# Patient Record
Sex: FEMALE | Race: BLACK OR AFRICAN AMERICAN | NOT HISPANIC OR LATINO | Employment: FULL TIME | URBAN - METROPOLITAN AREA
[De-identification: names, ages, dates, MRNs, and addresses within clinical notes are randomized per-mention and may not be internally consistent; named-entity substitution may affect disease eponyms.]

---
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
## 2019-01-28 ENCOUNTER — OFFICE VISIT (OUTPATIENT)
Dept: OBSTETRICS AND GYNECOLOGY | Facility: CLINIC | Age: 19
End: 2019-01-28
Payer: MEDICAID

## 2019-01-28 VITALS
BODY MASS INDEX: 24.38 KG/M2 | TEMPERATURE: 97 F | DIASTOLIC BLOOD PRESSURE: 72 MMHG | SYSTOLIC BLOOD PRESSURE: 133 MMHG | WEIGHT: 151.69 LBS | HEIGHT: 66 IN

## 2019-01-28 DIAGNOSIS — Z30.431 IUD CHECK UP: Primary | ICD-10-CM

## 2019-01-28 PROCEDURE — 99999 PR PBB SHADOW E&M-EST. PATIENT-LVL III: ICD-10-PCS | Mod: PBBFAC,,, | Performed by: OBSTETRICS & GYNECOLOGY

## 2019-01-28 PROCEDURE — 99213 PR OFFICE/OUTPT VISIT, EST, LEVL III, 20-29 MIN: ICD-10-PCS | Mod: S$PBB,,, | Performed by: OBSTETRICS & GYNECOLOGY

## 2019-01-28 PROCEDURE — 99999 PR PBB SHADOW E&M-EST. PATIENT-LVL III: CPT | Mod: PBBFAC,,, | Performed by: OBSTETRICS & GYNECOLOGY

## 2019-01-28 PROCEDURE — 99213 OFFICE O/P EST LOW 20 MIN: CPT | Mod: S$PBB,,, | Performed by: OBSTETRICS & GYNECOLOGY

## 2019-01-28 PROCEDURE — 99213 OFFICE O/P EST LOW 20 MIN: CPT | Mod: PBBFAC | Performed by: OBSTETRICS & GYNECOLOGY

## 2019-01-28 NOTE — PROGRESS NOTES
Subjective:       Patient ID: Fiona Le is a 18 y.o. female.    Chief Complaint:  IUD Check      History of Present Illness  HPI  Patient comes in today because she could not feel her IUD strings  Status post ParaGard insertion on 3/31/2016    No other complaint.      GYN & OB History  Patient's last menstrual period was 2019 (exact date).   Date of Last Pap: No result found    OB History    Para Term  AB Living   1 1 1     1   SAB TAB Ectopic Multiple Live Births         0 1      # Outcome Date GA Lbr Nahid/2nd Weight Sex Delivery Anes PTL Lv   1 Term 16 38w5d  3.738 kg (8 lb 3.9 oz) M Vag-Vacuum EPI N DOUG        Past Medical History:   Diagnosis Date    Asthma        History reviewed. No pertinent surgical history.    Family History   Problem Relation Age of Onset    Miscarriages / Stillbirths Mother     Hypertension Mother     Hypertension Maternal Grandmother     Diabetes Maternal Grandfather     Stroke Maternal Grandfather     Seizures Brother        Social History     Socioeconomic History    Marital status: Single     Spouse name: None    Number of children: None    Years of education: None    Highest education level: None   Social Needs    Financial resource strain: None    Food insecurity - worry: None    Food insecurity - inability: None    Transportation needs - medical: None    Transportation needs - non-medical: None   Occupational History    None   Tobacco Use    Smoking status: Never Smoker    Smokeless tobacco: Never Used   Substance and Sexual Activity    Alcohol use: No     Alcohol/week: 0.0 oz    Drug use: No    Sexual activity: Yes     Partners: Male     Birth control/protection: None   Other Topics Concern    None   Social History Narrative    Together for a year    Doing well at [employer]       Current Outpatient Medications   Medication Sig Dispense Refill    copper (PARAGARD T 380A) 380 square mm IUD 1 Device by  Intrauterine route continuous.      peak flow meter Nellie Use meter once daily & after each treatment with asthma medication. If peak flow is less than 70% of baseline, go to nearest ER for care. 1 each 0     No current facility-administered medications for this visit.        Review of patient's allergies indicates:   Allergen Reactions    Fish containing products Rash       Review of Systems  Review of Systems   Constitutional: Negative for activity change, appetite change, chills, fatigue, fever and unexpected weight change.   HENT: Negative for mouth sores.    Respiratory: Negative for cough, shortness of breath and wheezing.    Cardiovascular: Negative for chest pain and palpitations.   Gastrointestinal: Negative for abdominal pain, bloating, blood in stool, constipation, nausea and vomiting.   Endocrine: Negative for diabetes and hot flashes.   Genitourinary: Negative for dyspareunia, dysuria, frequency, hematuria, menorrhagia, menstrual problem, pelvic pain, urgency, vaginal bleeding, vaginal discharge, vaginal pain, dysmenorrhea, urinary incontinence, postcoital bleeding and vaginal odor.   Musculoskeletal: Negative for back pain and myalgias.   Neurological: Negative for seizures and headaches.   Psychiatric/Behavioral: Negative for depression and sleep disturbance. The patient is not nervous/anxious.    Breast: Negative for mass, mastodynia and nipple discharge          Objective:    Physical Exam:   Constitutional: She appears well-developed and well-nourished. No distress.    HENT:   Head: Normocephalic and atraumatic.    Eyes: EOM are normal.    Neck: Normal range of motion.     Pulmonary/Chest: Effort normal. No respiratory distress.        Abdominal: Soft. She exhibits no distension. There is no tenderness. There is no rebound and no guarding.     Genitourinary: Uterus normal. Vaginal discharge found.   Genitourinary Comments: Vulva without any obvious lesions.  Vaginal vault with good support.   Moderate bleeding noted.  No obvious lesion.  Cervix is without any cervical motion tenderness.  No obvious lesion.  ParaGard strings clearly visible.  Uterus is small, non-tender, normal contour.  Adnexa is without any masses or tenderness.           Musculoskeletal: Normal range of motion.       Neurological: She is alert.    Skin: Skin is warm and dry.    Psychiatric: She has a normal mood and affect.          Assessment:        1. IUD check up              Plan:      I have discussed with the patient regarding her condition  She is doing well with her ParaGard  Back next year.

## 2019-08-19 ENCOUNTER — HOSPITAL ENCOUNTER (EMERGENCY)
Facility: HOSPITAL | Age: 19
Discharge: HOME | End: 2019-08-19
Attending: EMERGENCY MEDICINE
Payer: MEDICAID

## 2019-08-19 VITALS
TEMPERATURE: 98 F | DIASTOLIC BLOOD PRESSURE: 65 MMHG | RESPIRATION RATE: 17 BRPM | BODY MASS INDEX: 23.22 KG/M2 | SYSTOLIC BLOOD PRESSURE: 123 MMHG | HEART RATE: 90 BPM | WEIGHT: 136 LBS | OXYGEN SATURATION: 100 % | HEIGHT: 64 IN

## 2019-08-19 DIAGNOSIS — L60.0 INGROWN TOENAIL WITH INFECTION: Primary | ICD-10-CM

## 2019-08-19 LAB
B-HCG UR QL: NEGATIVE
CTP QC/QA: YES

## 2019-08-19 PROCEDURE — 81025 URINE PREGNANCY TEST: CPT | Performed by: NURSE PRACTITIONER

## 2019-08-19 PROCEDURE — 99284 EMERGENCY DEPT VISIT MOD MDM: CPT

## 2019-08-19 PROCEDURE — 25000003 PHARM REV CODE 250: Performed by: EMERGENCY MEDICINE

## 2019-08-19 RX ORDER — NAPROXEN 500 MG/1
500 TABLET ORAL 2 TIMES DAILY WITH MEALS
Qty: 20 TABLET | Refills: 0 | Status: SHIPPED | OUTPATIENT
Start: 2019-08-19 | End: 2019-08-24

## 2019-08-19 RX ORDER — SULFAMETHOXAZOLE AND TRIMETHOPRIM 800; 160 MG/1; MG/1
1 TABLET ORAL 2 TIMES DAILY
Qty: 14 TABLET | Refills: 0 | Status: SHIPPED | OUTPATIENT
Start: 2019-08-19 | End: 2019-08-26

## 2019-08-19 RX ORDER — SULFAMETHOXAZOLE AND TRIMETHOPRIM 800; 160 MG/1; MG/1
1 TABLET ORAL
Status: COMPLETED | OUTPATIENT
Start: 2019-08-19 | End: 2019-08-19

## 2019-08-19 RX ORDER — NAPROXEN 500 MG/1
500 TABLET ORAL
Status: COMPLETED | OUTPATIENT
Start: 2019-08-19 | End: 2019-08-19

## 2019-08-19 RX ADMIN — SULFAMETHOXAZOLE AND TRIMETHOPRIM 1 TABLET: 800; 160 TABLET ORAL at 06:08

## 2019-08-19 RX ADMIN — NAPROXEN 500 MG: 500 TABLET ORAL at 06:08

## 2019-08-19 NOTE — ED PROVIDER NOTES
Encounter Date: 8/19/2019    SCRIBE #1 NOTE: I, Flores Stone, am scribing for, and in the presence of,  Magno Toledo MD. I have scribed the following portions of the note - Other sections scribed: HPI, ROS, PE.       History     Chief Complaint   Patient presents with    Toe Pain     left great toe pain after ingrown toenail removed     CC: Ingrown toenail    HPI:  This is a 19 y.o. female with a PMHx of asthma who presents to the Emergency Department with a cc of mild ingrown toenail pain on the left big toe. Symptoms are worsened by standing. Pt notes toe became purple so she cut the toenail herself. Patient reports Tetanus is up to date. She reports that her work requires her to stand. NKDA.        Review of patient's allergies indicates:   Allergen Reactions    Fish containing products Rash     Past Medical History:   Diagnosis Date    Asthma      History reviewed. No pertinent surgical history.  Family History   Problem Relation Age of Onset    Miscarriages / Stillbirths Mother     Hypertension Mother     Hypertension Maternal Grandmother     Diabetes Maternal Grandfather     Stroke Maternal Grandfather     Seizures Brother      Social History     Tobacco Use    Smoking status: Never Smoker    Smokeless tobacco: Never Used   Substance Use Topics    Alcohol use: No     Alcohol/week: 0.0 oz    Drug use: No     Review of Systems   Constitutional: Negative for fever.   HENT: Negative for sore throat.    Respiratory: Negative for shortness of breath.    Cardiovascular: Negative for chest pain.   Gastrointestinal: Negative for nausea.   Genitourinary: Negative for dysuria.   Musculoskeletal: Negative for back pain.        (+) toenail pain   Skin: Negative for rash.   Neurological: Negative for weakness.   Hematological: Does not bruise/bleed easily.       Physical Exam     Initial Vitals [08/19/19 1753]   BP Pulse Resp Temp SpO2   123/65 90 17 97.6 °F (36.4 °C) 100 %      MAP       --          Physical Exam    Constitutional: She appears well-developed and well-nourished.   HENT:   Head: Normocephalic and atraumatic.   Eyes: EOM are normal. Pupils are equal, round, and reactive to light.   Neck: Normal range of motion. Neck supple. No JVD present.   Cardiovascular: Normal rate, regular rhythm and normal heart sounds.   No murmur heard.  Pulmonary/Chest: Breath sounds normal. No respiratory distress.   Abdominal: Soft. Bowel sounds are normal. There is no tenderness.   Musculoskeletal:   Medial ingrown toenail to the right great toe with infection no abscess or drainage   Neurological: She is alert and oriented to person, place, and time. She has normal strength.   Skin: Skin is warm and dry. Capillary refill takes less than 2 seconds.   Psychiatric: She has a normal mood and affect. Her behavior is normal.         ED Course   Procedures  Labs Reviewed   POCT URINE PREGNANCY    The patient declining further removal of ingrown toenail this time.  There appears to be some granulation tissue an early infection but no evidence of abscess.  Will attempt soaks and antibiotics and follow up with Podiatry.  Return for any worsening symptoms.       Imaging Results    None          Medical Decision Making:   Clinical Tests:   Lab Tests: Ordered and Reviewed      Scribe attestation: I, Magno Toledo, personally performed the services described in this documentation. All medical record entries made by the scribe were at my direction and in my presence.  I have reviewed the chart and agree that the record reflects my personal performance and is accurate and complete.                   Clinical Impression:       ICD-10-CM ICD-9-CM   1. Ingrown toenail with infection L60.0 703.0                                Magno Toledo MD  08/21/19 9086

## 2019-08-19 NOTE — ED TRIAGE NOTES
Pt presents to ED c/o left great toe pain.  Pt states she cut ingrown toenail out approx 2 days ago and now toe is red and swollen.  Painful if standing/walking too long

## 2019-09-30 ENCOUNTER — HOSPITAL ENCOUNTER (EMERGENCY)
Facility: HOSPITAL | Age: 19
Discharge: HOME | End: 2019-09-30
Attending: EMERGENCY MEDICINE
Payer: MEDICAID

## 2019-09-30 VITALS
HEART RATE: 88 BPM | BODY MASS INDEX: 20.89 KG/M2 | DIASTOLIC BLOOD PRESSURE: 69 MMHG | RESPIRATION RATE: 18 BRPM | TEMPERATURE: 100 F | HEIGHT: 66 IN | SYSTOLIC BLOOD PRESSURE: 123 MMHG | WEIGHT: 130 LBS | OXYGEN SATURATION: 97 %

## 2019-09-30 DIAGNOSIS — J10.1 INFLUENZA B: Primary | ICD-10-CM

## 2019-09-30 LAB
B-HCG UR QL: NEGATIVE
CTP QC/QA: YES
CTP QC/QA: YES
POC MOLECULAR INFLUENZA A AGN: NEGATIVE
POC MOLECULAR INFLUENZA B AGN: POSITIVE

## 2019-09-30 PROCEDURE — 25000003 PHARM REV CODE 250: Performed by: NURSE PRACTITIONER

## 2019-09-30 PROCEDURE — 99283 EMERGENCY DEPT VISIT LOW MDM: CPT

## 2019-09-30 PROCEDURE — 81025 URINE PREGNANCY TEST: CPT | Performed by: NURSE PRACTITIONER

## 2019-09-30 RX ORDER — IBUPROFEN 600 MG/1
600 TABLET ORAL
Status: COMPLETED | OUTPATIENT
Start: 2019-09-30 | End: 2019-09-30

## 2019-09-30 RX ORDER — OSELTAMIVIR PHOSPHATE 75 MG/1
75 CAPSULE ORAL 2 TIMES DAILY
Qty: 10 CAPSULE | Refills: 0 | Status: SHIPPED | OUTPATIENT
Start: 2019-09-30 | End: 2019-10-05

## 2019-09-30 RX ORDER — IBUPROFEN 600 MG/1
600 TABLET ORAL EVERY 6 HOURS PRN
Qty: 20 TABLET | Refills: 0 | OUTPATIENT
Start: 2019-09-30 | End: 2020-01-13

## 2019-09-30 RX ADMIN — IBUPROFEN 600 MG: 600 TABLET, FILM COATED ORAL at 08:09

## 2019-10-01 NOTE — ED TRIAGE NOTES
Generalized body aches w/ fever and cough that began yesterday. Reports taking ibuprofen around 3.

## 2019-10-01 NOTE — ED PROVIDER NOTES
Encounter Date: 9/30/2019    This is a SORT/MSE of a 19 y.o. female presenting to the ED with c/o generalized body aches with fever and cough that began yesterday. Care will be transferred to an alternate provider when patient is roomed for a full evaluation and final disposition. BRAD Sun, FNP-C 9/30/2019 8:03 PM       History     Chief Complaint   Patient presents with    Generalized Body Aches     Generalized body aches w/ fever and cough that began yesterday. Reports taking ibuprofen around 3.      20 y/o female presents to the ED with complaints of fever, cough, and generalized body aches that began yesterday.  She denies rash, abdominal pain, nausea, vomiting, diarrhea, numbness, weakness, runny nose, nasal congestion, CP, SOB, or any other complaints.  She took Ibuprofen at 3 PM today with minimal relief.  She rates her current pain as 9/10.  No aggravating or alleviating factors.          Review of patient's allergies indicates:   Allergen Reactions    Fish containing products Rash     Past Medical History:   Diagnosis Date    Asthma      No past surgical history on file.  Family History   Problem Relation Age of Onset    Miscarriages / Stillbirths Mother     Hypertension Mother     Hypertension Maternal Grandmother     Diabetes Maternal Grandfather     Stroke Maternal Grandfather     Seizures Brother      Social History     Tobacco Use    Smoking status: Never Smoker    Smokeless tobacco: Never Used   Substance Use Topics    Alcohol use: No     Alcohol/week: 0.0 standard drinks    Drug use: No     Review of Systems   Constitutional: Positive for fever. Negative for chills.   HENT: Negative for congestion, ear pain, rhinorrhea, sore throat and trouble swallowing.    Eyes: Negative for pain, discharge and redness.   Respiratory: Positive for cough. Negative for shortness of breath.    Cardiovascular: Negative for chest pain.   Gastrointestinal: Negative for abdominal pain, diarrhea, nausea  and vomiting.   Genitourinary: Negative for decreased urine volume and dysuria.   Musculoskeletal: Positive for myalgias. Negative for back pain, neck pain and neck stiffness.   Skin: Negative for rash.   Neurological: Negative for dizziness, weakness, light-headedness, numbness and headaches.   Psychiatric/Behavioral: Negative for confusion.       Physical Exam     Initial Vitals [09/30/19 1933]   BP Pulse Resp Temp SpO2   120/63 89 16 99.1 °F (37.3 °C) 97 %      MAP       --         Physical Exam    Nursing note and vitals reviewed.  Constitutional: Vital signs are normal. She appears well-developed.  Non-toxic appearance. She does not appear ill.   HENT:   Head: Normocephalic and atraumatic.   Right Ear: Tympanic membrane normal.   Left Ear: Tympanic membrane normal.   Nose: Mucosal edema present.   Mouth/Throat: Uvula is midline, oropharynx is clear and moist and mucous membranes are normal.   Eyes: Conjunctivae are normal.   Neck: Normal range of motion.   Cardiovascular: Normal rate and regular rhythm.   Pulmonary/Chest: Effort normal and breath sounds normal. She exhibits no tenderness.   Abdominal: Soft. There is no tenderness.   Neurological: She is alert and oriented to person, place, and time. Gait normal. GCS eye subscore is 4. GCS verbal subscore is 5. GCS motor subscore is 6.   Skin: Skin is warm, dry and intact. No rash noted.   Psychiatric: She has a normal mood and affect. Her speech is normal and behavior is normal. Judgment and thought content normal.         ED Course   Procedures  Labs Reviewed   POCT INFLUENZA A/B MOLECULAR - Abnormal; Notable for the following components:       Result Value    POC Molecular Influenza B Ag Positive (*)     All other components within normal limits   POCT URINE PREGNANCY          Imaging Results    None                APC / Resident Notes:   This is an evaluation of a 19 y.o. female that presents to the Emergency Department for Fever, Cough and Body Aches for 1  days. The patient is a non-toxic, afebrile, and well appearing female. On physical exam ears are without infection. Pharynx with no erythema and no exudates. Appears well hydrated with moist mucus membranes. Neck soft and supple with no meningeal signs; without cervical lymphadenopathy. Breath sounds are clear and equal bilaterally. No tachypnea or respiratory distress with room air pulse ox of 97% and no evidence of hypoxia or cyanosis.     Vital Signs Are Reassuring. RESULTS: Influenza: Positive.    The patient is within the antiviral treatment window and has been offered treatment with Tamilfu. Risks/Benefits discussed and the patient would like to receive the prescription. [facility] information handout will be on the discharge paperwork.     My overall impression is Influenza B. I considered, but at this time, do not suspect OM, OE, strep pharyngitis, meningitis, pneumonia, significant dehydration, or acute bacterial sinusitis.    ED Course: UPT, Influenza, Ibuprofen. D/C Meds: Ibuprofen, Tamiflu. D/C Information: Supportive care, Tylenol/Ibuprofen PRN, Hydration. The diagnosis, treatment plan, instructions for follow-up and reevaluation with PCP as well as ED return precautions were discussed and understanding was verbalized. All questions or concerns have been addressed.                        Clinical Impression:       ICD-10-CM ICD-9-CM   1. Influenza B J10.1 487.1         Disposition:   Disposition: Discharged  Condition: Stable                        KINJAL Wells  09/30/19 2049

## 2020-01-13 ENCOUNTER — HOSPITAL ENCOUNTER (EMERGENCY)
Facility: HOSPITAL | Age: 20
Discharge: HOME | End: 2020-01-13
Attending: EMERGENCY MEDICINE
Payer: MEDICAID

## 2020-01-13 VITALS
DIASTOLIC BLOOD PRESSURE: 64 MMHG | OXYGEN SATURATION: 97 % | RESPIRATION RATE: 17 BRPM | HEIGHT: 66 IN | HEART RATE: 83 BPM | WEIGHT: 155 LBS | TEMPERATURE: 98 F | BODY MASS INDEX: 24.91 KG/M2 | SYSTOLIC BLOOD PRESSURE: 115 MMHG

## 2020-01-13 DIAGNOSIS — N64.4 BREAST PAIN: ICD-10-CM

## 2020-01-13 DIAGNOSIS — R10.30 LOWER ABDOMINAL PAIN: Primary | ICD-10-CM

## 2020-01-13 LAB
B-HCG UR QL: NEGATIVE
BILIRUB UR QL STRIP: NEGATIVE
CLARITY UR: CLEAR
COLOR UR: YELLOW
CTP QC/QA: YES
GLUCOSE UR QL STRIP: NEGATIVE
HGB UR QL STRIP: NEGATIVE
KETONES UR QL STRIP: ABNORMAL
LEUKOCYTE ESTERASE UR QL STRIP: ABNORMAL
MICROSCOPIC COMMENT: NORMAL
NITRITE UR QL STRIP: NEGATIVE
PH UR STRIP: 6 [PH] (ref 5–8)
PROT UR QL STRIP: NEGATIVE
SP GR UR STRIP: 1.01 (ref 1–1.03)
URN SPEC COLLECT METH UR: ABNORMAL
UROBILINOGEN UR STRIP-ACNC: NEGATIVE EU/DL
WBC #/AREA URNS HPF: 1 /HPF (ref 0–5)

## 2020-01-13 PROCEDURE — 99283 EMERGENCY DEPT VISIT LOW MDM: CPT

## 2020-01-13 PROCEDURE — 25000003 PHARM REV CODE 250: Performed by: PHYSICIAN ASSISTANT

## 2020-01-13 PROCEDURE — 81025 URINE PREGNANCY TEST: CPT | Performed by: PHYSICIAN ASSISTANT

## 2020-01-13 PROCEDURE — 81000 URINALYSIS NONAUTO W/SCOPE: CPT

## 2020-01-13 RX ORDER — IBUPROFEN 600 MG/1
600 TABLET ORAL
Status: COMPLETED | OUTPATIENT
Start: 2020-01-13 | End: 2020-01-13

## 2020-01-13 RX ORDER — IBUPROFEN 600 MG/1
600 TABLET ORAL EVERY 6 HOURS PRN
Qty: 20 TABLET | Refills: 0 | Status: SHIPPED | OUTPATIENT
Start: 2020-01-13 | End: 2020-12-14

## 2020-01-13 RX ADMIN — IBUPROFEN 600 MG: 600 TABLET ORAL at 11:01

## 2020-01-14 NOTE — ED TRIAGE NOTES
Pt reports lower back pain that started yesterday and breast soreness and severe pain that starts today. Pt reports taking ibuprofen for pain and pt is allergic to fish products.

## 2020-01-14 NOTE — DISCHARGE INSTRUCTIONS
Follow-up and establish care with a primary care provider for re-evaluation further recommendations.  Follow-up with an OBGYN due to breast pain.    Tylenol/ibuprofen as needed for discomfort.  Return to this ED if you begin with persistent vomiting, if you begin with fever, if pain worsens despite treatment, if you begin with breast swelling/redness/warmth, if any other problems occur.

## 2020-01-14 NOTE — ED PROVIDER NOTES
Encounter Date: 1/13/2020       History     Chief Complaint   Patient presents with    Breast Pain     patient reports breast and back pain X 1 day. Patient reports iud in place. rates pain 9/10 describes the pain as squeezing     19-year-old female with history of asthma presents to ED complaining of low abdominal cramping and midline low back pain which began today.  Abdominal pain is constant.  No alleviating or exacerbating factors.  No nausea vomiting.  No trauma.  Denies dysuria, hematuria, strong odor to urine, or increased urinary frequency.  No flank pain. No history of any abdominal surgeries.  No fever or chills. Normal bowel habits.  Soreness to midline low back, worse with movement.  No radiculopathy or paresthesia.  No leg weakness.  No GI/ issues, no bowel or bladder incontinence or retention.  Denies trauma. Patient also complaining of bilateral breast soreness today.  Denies history of any masses. No areola swelling or nipple discharge. Denies any redness or warmth.  No history of any previous breast issues.  LMP 12/25/2019.  IUD in place, admits to irregular menses.  No vaginal complaints at this time.        Review of patient's allergies indicates:   Allergen Reactions    Fish containing products Rash     Past Medical History:   Diagnosis Date    Asthma      History reviewed. No pertinent surgical history.  Family History   Problem Relation Age of Onset    Miscarriages / Stillbirths Mother     Hypertension Mother     Hypertension Maternal Grandmother     Diabetes Maternal Grandfather     Stroke Maternal Grandfather     Seizures Brother      Social History     Tobacco Use    Smoking status: Never Smoker    Smokeless tobacco: Never Used   Substance Use Topics    Alcohol use: No     Alcohol/week: 0.0 standard drinks    Drug use: No     Review of Systems   Constitutional: Negative for activity change, appetite change, chills and fever.   HENT: Negative for congestion and rhinorrhea.     Respiratory: Negative for apnea and cough.    Cardiovascular: Negative for chest pain and leg swelling.   Gastrointestinal: Positive for abdominal pain. Negative for constipation, diarrhea, nausea and vomiting.   Genitourinary: Negative for difficulty urinating, dysuria, flank pain, frequency, hematuria, vaginal bleeding, vaginal discharge and vaginal pain.   Musculoskeletal: Negative for myalgias, neck pain and neck stiffness.        Breast pain bilateral   Neurological: Negative for dizziness, light-headedness and headaches.       Physical Exam     Initial Vitals [01/13/20 2126]   BP Pulse Resp Temp SpO2   (!) 120/57 75 17 98.8 °F (37.1 °C) 95 %      MAP       --         Physical Exam    Nursing note and vitals reviewed.  Constitutional: She appears well-developed and well-nourished. She is not diaphoretic. No distress.   Well-appearing and nontoxic.  Resting comfortably on exam table.   HENT:   Head: Normocephalic and atraumatic.   Eyes: EOM are normal. Pupils are equal, round, and reactive to light.   Neck: Normal range of motion. Neck supple.   Cardiovascular: Intact distal pulses.   Pulmonary/Chest: No respiratory distress.   There is generalized bilateral breast tenderness, no palpable masses, no swelling or overlying skin changes, no peau d'orange appearance, no nipple changes or discharge.   Abdominal:   Abdomen overall soft, normal bowel sounds ×4.  Mild TTP suprapubic region.  No rebound or guarding.  No palpable mass or distention.  No flank or CVA tenderness.  Negative Hardin sign.  No pain over McBurney's point.   Musculoskeletal: Normal range of motion. She exhibits no tenderness.   Neurological: She is alert and oriented to person, place, and time. GCS score is 15. GCS eye subscore is 4. GCS verbal subscore is 5. GCS motor subscore is 6.   Skin: Skin is warm and dry. Capillary refill takes less than 2 seconds. No erythema.   Psychiatric: She has a normal mood and affect. Her behavior is normal.  Judgment and thought content normal.         ED Course   Procedures  Labs Reviewed   URINALYSIS, REFLEX TO URINE CULTURE - Abnormal; Notable for the following components:       Result Value    Ketones, UA Trace (*)     Leukocytes, UA Trace (*)     All other components within normal limits    Narrative:     Preferred Collection Type->Urine, Clean Catch   URINALYSIS MICROSCOPIC    Narrative:     Preferred Collection Type->Urine, Clean Catch   POCT URINE PREGNANCY          Imaging Results    None          Medical Decision Making:   Differential Diagnosis:   UTI, STI, PID, constipation, pregnancy, hormonal changes?, viral illness  ED Management:  Unsure of cause of patient's symptoms. I do not suspect emergent process.  PCP and OB follow-up.  Continue supportive treatment.                                 Clinical Impression:       ICD-10-CM ICD-9-CM   1. Lower abdominal pain R10.30 789.09   2. Breast pain N64.4 611.71         Disposition:   Disposition: Discharged  Condition: Stable                     Nils Onofre PA-C  01/14/20 0614

## 2020-03-06 ENCOUNTER — HOSPITAL ENCOUNTER (EMERGENCY)
Facility: HOSPITAL | Age: 20
Discharge: HOME | End: 2020-03-06
Attending: EMERGENCY MEDICINE
Payer: MEDICAID

## 2020-03-06 VITALS
HEIGHT: 64 IN | TEMPERATURE: 98 F | RESPIRATION RATE: 18 BRPM | HEART RATE: 74 BPM | BODY MASS INDEX: 23.05 KG/M2 | DIASTOLIC BLOOD PRESSURE: 53 MMHG | OXYGEN SATURATION: 98 % | SYSTOLIC BLOOD PRESSURE: 113 MMHG | WEIGHT: 135 LBS

## 2020-03-06 DIAGNOSIS — R10.31 RIGHT LOWER QUADRANT PAIN: Primary | ICD-10-CM

## 2020-03-06 LAB
ALBUMIN SERPL BCP-MCNC: 4.4 G/DL (ref 3.5–5.2)
ALP SERPL-CCNC: 63 U/L (ref 55–135)
ALT SERPL W/O P-5'-P-CCNC: 24 U/L (ref 10–44)
ANION GAP SERPL CALC-SCNC: 10 MMOL/L (ref 8–16)
AST SERPL-CCNC: 23 U/L (ref 10–40)
B-HCG UR QL: NEGATIVE
BACTERIA GENITAL QL WET PREP: ABNORMAL
BASOPHILS # BLD AUTO: 0.02 K/UL (ref 0–0.2)
BASOPHILS NFR BLD: 0.3 % (ref 0–1.9)
BILIRUB SERPL-MCNC: 0.4 MG/DL (ref 0.1–1)
BILIRUB UR QL STRIP: NEGATIVE
BUN SERPL-MCNC: 8 MG/DL (ref 6–20)
CALCIUM SERPL-MCNC: 9.7 MG/DL (ref 8.7–10.5)
CHLORIDE SERPL-SCNC: 102 MMOL/L (ref 95–110)
CLARITY UR: CLEAR
CLUE CELLS VAG QL WET PREP: ABNORMAL
CO2 SERPL-SCNC: 27 MMOL/L (ref 23–29)
COLOR UR: YELLOW
CREAT SERPL-MCNC: 0.8 MG/DL (ref 0.5–1.4)
CTP QC/QA: YES
DIFFERENTIAL METHOD: ABNORMAL
EOSINOPHIL # BLD AUTO: 0.1 K/UL (ref 0–0.5)
EOSINOPHIL NFR BLD: 1.1 % (ref 0–8)
ERYTHROCYTE [DISTWIDTH] IN BLOOD BY AUTOMATED COUNT: 12.2 % (ref 11.5–14.5)
EST. GFR  (AFRICAN AMERICAN): >60 ML/MIN/1.73 M^2
EST. GFR  (NON AFRICAN AMERICAN): >60 ML/MIN/1.73 M^2
FILAMENT FUNGI VAG WET PREP-#/AREA: ABNORMAL
GLUCOSE SERPL-MCNC: 88 MG/DL (ref 70–110)
GLUCOSE UR QL STRIP: NEGATIVE
HCT VFR BLD AUTO: 40.5 % (ref 37–48.5)
HGB BLD-MCNC: 12.7 G/DL (ref 12–16)
HGB UR QL STRIP: NEGATIVE
IMM GRANULOCYTES # BLD AUTO: 0.01 K/UL (ref 0–0.04)
IMM GRANULOCYTES NFR BLD AUTO: 0.2 % (ref 0–0.5)
KETONES UR QL STRIP: NEGATIVE
LEUKOCYTE ESTERASE UR QL STRIP: NEGATIVE
LIPASE SERPL-CCNC: 43 U/L (ref 4–60)
LYMPHOCYTES # BLD AUTO: 2.9 K/UL (ref 1–4.8)
LYMPHOCYTES NFR BLD: 47.7 % (ref 18–48)
MCH RBC QN AUTO: 28.7 PG (ref 27–31)
MCHC RBC AUTO-ENTMCNC: 31.4 G/DL (ref 32–36)
MCV RBC AUTO: 92 FL (ref 82–98)
MONOCYTES # BLD AUTO: 0.4 K/UL (ref 0.3–1)
MONOCYTES NFR BLD: 6.7 % (ref 4–15)
NEUTROPHILS # BLD AUTO: 2.7 K/UL (ref 1.8–7.7)
NEUTROPHILS NFR BLD: 44 % (ref 38–73)
NITRITE UR QL STRIP: NEGATIVE
NRBC BLD-RTO: 0 /100 WBC
PH UR STRIP: 5 [PH] (ref 5–8)
PLATELET # BLD AUTO: 271 K/UL (ref 150–350)
PMV BLD AUTO: 10.3 FL (ref 9.2–12.9)
POTASSIUM SERPL-SCNC: 3.9 MMOL/L (ref 3.5–5.1)
PROT SERPL-MCNC: 8.4 G/DL (ref 6–8.4)
PROT UR QL STRIP: NEGATIVE
RBC # BLD AUTO: 4.42 M/UL (ref 4–5.4)
SODIUM SERPL-SCNC: 139 MMOL/L (ref 136–145)
SP GR UR STRIP: 1.02 (ref 1–1.03)
SPECIMEN SOURCE: ABNORMAL
T VAGINALIS GENITAL QL WET PREP: ABNORMAL
URN SPEC COLLECT METH UR: ABNORMAL
UROBILINOGEN UR STRIP-ACNC: ABNORMAL EU/DL
WBC # BLD AUTO: 6.12 K/UL (ref 3.9–12.7)
WBC #/AREA VAG WET PREP: ABNORMAL
YEAST GENITAL QL WET PREP: ABNORMAL

## 2020-03-06 PROCEDURE — 81025 URINE PREGNANCY TEST: CPT | Performed by: NURSE PRACTITIONER

## 2020-03-06 PROCEDURE — 99285 EMERGENCY DEPT VISIT HI MDM: CPT | Mod: 25

## 2020-03-06 PROCEDURE — 96374 THER/PROPH/DIAG INJ IV PUSH: CPT | Mod: 59

## 2020-03-06 PROCEDURE — 85025 COMPLETE CBC W/AUTO DIFF WBC: CPT

## 2020-03-06 PROCEDURE — 63600175 PHARM REV CODE 636 W HCPCS: Performed by: NURSE PRACTITIONER

## 2020-03-06 PROCEDURE — 96361 HYDRATE IV INFUSION ADD-ON: CPT

## 2020-03-06 PROCEDURE — 81003 URINALYSIS AUTO W/O SCOPE: CPT

## 2020-03-06 PROCEDURE — 80053 COMPREHEN METABOLIC PANEL: CPT

## 2020-03-06 PROCEDURE — 25000003 PHARM REV CODE 250: Performed by: NURSE PRACTITIONER

## 2020-03-06 PROCEDURE — 87210 SMEAR WET MOUNT SALINE/INK: CPT

## 2020-03-06 PROCEDURE — 83690 ASSAY OF LIPASE: CPT

## 2020-03-06 PROCEDURE — 25500020 PHARM REV CODE 255: Performed by: NURSE PRACTITIONER

## 2020-03-06 PROCEDURE — 87491 CHLMYD TRACH DNA AMP PROBE: CPT

## 2020-03-06 RX ORDER — NAPROXEN 500 MG/1
500 TABLET ORAL 2 TIMES DAILY WITH MEALS
Qty: 60 TABLET | Refills: 0 | Status: SHIPPED | OUTPATIENT
Start: 2020-03-06 | End: 2020-04-15 | Stop reason: SDUPTHER

## 2020-03-06 RX ORDER — IBUPROFEN 600 MG/1
600 TABLET ORAL
Status: COMPLETED | OUTPATIENT
Start: 2020-03-06 | End: 2020-03-06

## 2020-03-06 RX ORDER — MORPHINE SULFATE 10 MG/ML
4 INJECTION INTRAMUSCULAR; INTRAVENOUS; SUBCUTANEOUS
Status: COMPLETED | OUTPATIENT
Start: 2020-03-06 | End: 2020-03-06

## 2020-03-06 RX ORDER — MAG HYDROX/ALUMINUM HYD/SIMETH 200-200-20
30 SUSPENSION, ORAL (FINAL DOSE FORM) ORAL
Status: COMPLETED | OUTPATIENT
Start: 2020-03-06 | End: 2020-03-06

## 2020-03-06 RX ADMIN — MORPHINE SULFATE 4 MG: 10 INJECTION INTRAVENOUS at 05:03

## 2020-03-06 RX ADMIN — ALUMINUM HYDROXIDE, MAGNESIUM HYDROXIDE, AND SIMETHICONE 30 ML: 200; 200; 20 SUSPENSION ORAL at 03:03

## 2020-03-06 RX ADMIN — IBUPROFEN 600 MG: 600 TABLET, FILM COATED ORAL at 03:03

## 2020-03-06 RX ADMIN — SODIUM CHLORIDE 1000 ML: 0.9 INJECTION, SOLUTION INTRAVENOUS at 04:03

## 2020-03-06 RX ADMIN — IOHEXOL 75 ML: 350 INJECTION, SOLUTION INTRAVENOUS at 05:03

## 2020-03-06 NOTE — ED PROVIDER NOTES
Encounter Date: 3/6/2020    SCRIBE #1 NOTE: IYisel am scribing for, and in the presence of,  Naila Espinal NP. I have scribed the following portions of the note - Other sections scribed: NADIR GARCIA.       History     Chief Complaint   Patient presents with    Flank Pain     Pt presenting with co right side flank pain that started on Wednesday, denies changes in urination, or taking any medication.     The patient is a 19 year old female who presents to the ED for evaluation of right lower quadrant abdominal pain that radiates to her right flank, and right lower back that started 2 days ago. Rates 9/10 in severity. She denies fever, nausea, vomiting, diarrhea, vaginal bleeding, vaginal discharge, urinary symptoms, body aches, or appetite changes. She reports her pain is worsened with movements. She denies taking any medications prior to arrival.     The history is provided by the patient. No  was used.     Review of patient's allergies indicates:   Allergen Reactions    Fish containing products Rash     Past Medical History:   Diagnosis Date    Asthma      History reviewed. No pertinent surgical history.  Family History   Problem Relation Age of Onset    Miscarriages / Stillbirths Mother     Hypertension Mother     Hypertension Maternal Grandmother     Diabetes Maternal Grandfather     Stroke Maternal Grandfather     Seizures Brother      Social History     Tobacco Use    Smoking status: Never Smoker    Smokeless tobacco: Never Used   Substance Use Topics    Alcohol use: No     Alcohol/week: 0.0 standard drinks    Drug use: No     Review of Systems   Constitutional: Negative for appetite change and fever.        (-)body aches   HENT: Negative for congestion.    Respiratory: Negative for shortness of breath.    Cardiovascular: Negative for chest pain.   Gastrointestinal: Positive for abdominal pain. Negative for diarrhea, nausea and vomiting.   Genitourinary: Positive for flank  pain. Negative for dysuria, hematuria, vaginal bleeding and vaginal discharge.   Musculoskeletal: Positive for back pain.   Skin: Negative for rash.   Neurological: Negative for headaches.   Psychiatric/Behavioral: Negative for confusion.       Physical Exam     Initial Vitals   BP Pulse Resp Temp SpO2   03/06/20 1458 03/06/20 1452 03/06/20 1452 03/06/20 1452 03/06/20 1452   127/61 98 19 98.3 °F (36.8 °C) 100 %      MAP       --                Physical Exam    Nursing note and vitals reviewed.  Constitutional: Vital signs are normal. She appears well-developed and well-nourished. She is not diaphoretic. She is active and cooperative.  Non-toxic appearance. She does not have a sickly appearance.   Eyes: Conjunctivae and EOM are normal. Pupils are equal, round, and reactive to light.   Neck: Normal range of motion. Neck supple.   Pulmonary/Chest: No respiratory distress.   Abdominal: Soft. Normal appearance and bowel sounds are normal. There is no hepatosplenomegaly. There is tenderness in the right lower quadrant. There is no rigidity, no rebound, no guarding, no CVA tenderness, no tenderness at McBurney's point and negative Hardin's sign. No hernia.   Genitourinary: Pelvic exam was performed with patient supine. There is no rash or tenderness on the right labia. There is no rash or tenderness on the left labia. Cervix exhibits no motion tenderness, no discharge and no friability. Right adnexum displays no mass and no tenderness. Left adnexum displays no mass and no tenderness. No erythema, tenderness or bleeding in the vagina. No foreign body in the vagina. No signs of injury around the vagina. Vaginal discharge (small amount of cloudy discharge) found.   Musculoskeletal: Normal range of motion.   Neurological: She is alert and oriented to person, place, and time. She has normal strength.   Skin: Skin is warm and dry.   Psychiatric: She has a normal mood and affect.         ED Course   Procedures  Labs Reviewed    URINALYSIS, REFLEX TO URINE CULTURE - Abnormal; Notable for the following components:       Result Value    Urobilinogen, UA 2.0-3.0 (*)     All other components within normal limits    Narrative:     Preferred Collection Type->Urine, Clean Catch   CBC W/ AUTO DIFFERENTIAL - Abnormal; Notable for the following components:    Mean Corpuscular Hemoglobin Conc 31.4 (*)     All other components within normal limits   VAGINAL SCREEN - Abnormal; Notable for the following components:    Clue Cells Occasional (*)     WBC - Vaginal Screen Rare (*)     Bacteria - Vaginal Screen Occasional (*)     All other components within normal limits   C. TRACHOMATIS/N. GONORRHOEAE BY AMP DNA   COMPREHENSIVE METABOLIC PANEL   LIPASE   POCT URINE PREGNANCY          Imaging Results          CT Abdomen Pelvis With Contrast (Final result)  Result time 03/06/20 17:59:53    Final result by Loulou Vázquez MD (03/06/20 17:59:53)                 Impression:      No acute abdominal or pelvic pathology CT of the abdomen and pelvis with contrast.    Small right renal cyst.    No evidence of acute appendicitis.    IUD.  Prominent right ovarian follicle.      Electronically signed by: Loulou Vázquez  Date:    03/06/2020  Time:    17:59             Narrative:    EXAMINATION:  CT OF ABDOMEN PELVIS WITH    CLINICAL HISTORY:  RLQ pain, appendicitis suspected;    TECHNIQUE:  5 mm enhanced axial images were obtained from the lung bases through the greater trochanters.  Seventy-five mL of Omnipaque 350 was injected.    COMPARISON:  None.    FINDINGS:  A 12 x 15 mm right renal cyst is present.  The liver, spleen, pancreas, left kidney and adrenal glands are unremarkable. The gallbladder contains no calcified gallstones.    There is no definite evidence for abdominal adenopathy or ascites.    The appendix is not inflamed (series 601, image 30 through 40).  There are no pelvic masses or adenopathy.  An IUD is present.  There is a prominent right ovarian  follicle.    There is no free fluid in the pelvis.    The lung bases are grossly clear.                                 Medical Decision Making:   Differential Diagnosis:   UTI, ovarian cyst, ruptured ovarian cyst, MSK strain, appendicitis, renal colic, cervicitis/PID  Clinical Tests:   Lab Tests: Ordered and Reviewed  The following lab test(s) were unremarkable: CBC, CMP, Lipase, UPT and Urinalysis  Radiological Study: Ordered and Reviewed  ED Management:  This is an urgent evaluation of a 19-year-old female who presents emergency room complaining of right lower quadrant pain that radiates to her right flank and right back.  I denies any other GI or  symptoms and is afebrile nontoxic-appearing.  She does have mild-to-moderate tenderness over the right lower quadrant and flank.  Initially, we checked for urinary tract infection but if it did not appear infected.  Upon re-evaluation, patient still reported a significant amount of pain despite ibuprofen in the ED.  She was then referred for labs, pelvic exam, and a CT which shows no obvious findings to explain her abdominal pain.  Patient was re-evaluated reports that pain has improved since she received morphine earlier.  Patient was explained the results of her workup today.  Patient questioned whether her pain might be due to her IUD, which frequently gives her pain over the last 4 years.  I recommended that she follow up with her OBGYN.  Otherwise I see no indication for further workup or intervention at this time.  I am not concerned for acute surgical abdomen and believe she is stable for discharge and outpatient management.  Prescribe naproxen for supportive care.  Return precautions were given for any new or worsening symptoms or concerns.              Scribe Attestation:   Scribe #1: I performed the above scribed service and the documentation accurately describes the services I performed. I attest to the accuracy of the note.                           Clinical Impression:       ICD-10-CM ICD-9-CM   1. Right lower quadrant pain R10.31 789.03         Disposition:   Disposition: Discharged  Condition: Stable     ED Disposition Condition    Discharge Stable       I, Naila Espinal, personally performed the services described in this documentation. All medical record entries made by the scribe were at my direction and in my presence. I have reviewed the chart and agree that the record reflects my personal performance and is accurate and complete.    ED Prescriptions     Medication Sig Dispense Start Date End Date Auth. Provider    naproxen (NAPROSYN) 500 MG tablet Take 1 tablet (500 mg total) by mouth 2 (two) times daily with meals. 60 tablet 3/6/2020  Naila Espinal NP        Follow-up Information     Follow up With Specialties Details Why Contact Info    Charles Hein MD Pediatrics In 3 days If symptoms have not improved [address] [place] [address]  [id number]  [facility] [id number]  [phone]      [facility]  If symptoms worsen [address] [facility] [id number] [phone]                                     Naila Espinal NP  03/06/20 1924

## 2020-03-07 NOTE — DISCHARGE INSTRUCTIONS
Please get plenty of rest and drink lots of water to stay well-hydrated.    Take Naproxen for pain, as needed.  Take naproxen on a full stomach and drink plenty of water.    Follow up with your regular doctor in 2-3 days for further evaluation of your symptoms, if they persist.    Return to the ER for any new/worsening symptoms.

## 2020-03-09 LAB
C TRACH DNA SPEC QL NAA+PROBE: NOT DETECTED
N GONORRHOEA DNA SPEC QL NAA+PROBE: NOT DETECTED

## 2020-03-19 ENCOUNTER — HOSPITAL ENCOUNTER (EMERGENCY)
Facility: HOSPITAL | Age: 20
Discharge: HOME | End: 2020-03-19
Attending: EMERGENCY MEDICINE
Payer: MEDICAID

## 2020-03-19 VITALS
HEIGHT: 64 IN | SYSTOLIC BLOOD PRESSURE: 117 MMHG | BODY MASS INDEX: 23.05 KG/M2 | DIASTOLIC BLOOD PRESSURE: 82 MMHG | RESPIRATION RATE: 20 BRPM | TEMPERATURE: 98 F | WEIGHT: 135 LBS | OXYGEN SATURATION: 98 % | HEART RATE: 88 BPM

## 2020-03-19 DIAGNOSIS — R05.9 COUGH: ICD-10-CM

## 2020-03-19 DIAGNOSIS — B34.9 VIRAL SYNDROME: Primary | ICD-10-CM

## 2020-03-19 DIAGNOSIS — Z71.89 ADVICE GIVEN ABOUT COVID-19 VIRUS INFECTION: ICD-10-CM

## 2020-03-19 LAB
B-HCG UR QL: NEGATIVE
CTP QC/QA: YES
CTP QC/QA: YES
POC MOLECULAR INFLUENZA A AGN: NEGATIVE
POC MOLECULAR INFLUENZA B AGN: NEGATIVE

## 2020-03-19 PROCEDURE — 87502 INFLUENZA DNA AMP PROBE: CPT

## 2020-03-19 PROCEDURE — 99284 EMERGENCY DEPT VISIT MOD MDM: CPT | Mod: 25

## 2020-03-19 PROCEDURE — 81025 URINE PREGNANCY TEST: CPT | Performed by: PHYSICIAN ASSISTANT

## 2020-03-19 RX ORDER — BENZONATATE 100 MG/1
100 CAPSULE ORAL 3 TIMES DAILY PRN
Qty: 15 CAPSULE | Refills: 0 | Status: SHIPPED | OUTPATIENT
Start: 2020-03-19 | End: 2020-03-29

## 2020-03-19 NOTE — ED TRIAGE NOTES
Pt comes to the ED, reports a dry nonproductive cough that started x2 days ago. Denies fever or n/v. Also reports frontal headache that started this morning. Took OTC cough syrup PTA in ED.

## 2020-03-19 NOTE — ED PROVIDER NOTES
"Encounter Date: 3/19/2020       History     Chief Complaint   Patient presents with    Cough     "I feel like I can't breathe". Reports a dry nonproductive cough that started x2 days ago. Denies fever or n/v.  +frontal headache that started this morning. Took OTC cough syrup PTA in ED.     Headache    Chills     Chief Complaint: Cough  History of  Present Illness: History obtained from patient. This 19 y.o. female who has no known past medical history presents to the ED complaining of nonproductive cough for 1 day with associated frontal headache, generalized body aches, congestion, rhinorrhea.  Patient reports attempted treatment with over-the-counter medications without leave.  She reports onset of shortness of breath today stating that "I am not if I was panicking or not."  Denies chest pain, fever, nausea, vomiting, diarrhea.  No recent travel.  No known sick contacts.  No recent contact with individuals testing positive for COVID-19. No risk factors including active cancer, chronic lung disease, history of organ transplant, use of immunosuppressive medications, hemodialysis, advanced HIV.  Patient does not live in a communal setting including nursing facility or home shelter.  Patient is not a healthcare worker with direct contact with confirm/presumed positive COVID case.          Review of patient's allergies indicates:   Allergen Reactions    Fish containing products Rash     Past Medical History:   Diagnosis Date    Asthma      History reviewed. No pertinent surgical history.  Family History   Problem Relation Age of Onset    Miscarriages / Stillbirths Mother     Hypertension Mother     Hypertension Maternal Grandmother     Diabetes Maternal Grandfather     Stroke Maternal Grandfather     Seizures Brother      Social History     Tobacco Use    Smoking status: Never Smoker    Smokeless tobacco: Never Used   Substance Use Topics    Alcohol use: No     Alcohol/week: 0.0 standard drinks    Drug " use: No     Review of Systems   Constitutional: Positive for chills. Negative for fever.   HENT: Negative for congestion, rhinorrhea and sore throat.    Eyes: Negative for visual disturbance.   Respiratory: Positive for cough. Negative for shortness of breath.    Cardiovascular: Negative for chest pain.   Gastrointestinal: Negative for abdominal pain, diarrhea, nausea and vomiting.   Genitourinary: Negative for dysuria, frequency and hematuria.   Musculoskeletal: Positive for myalgias. Negative for back pain.   Skin: Negative for rash.   Neurological: Positive for headaches. Negative for dizziness and weakness.       Physical Exam     Initial Vitals [03/19/20 0323]   BP Pulse Resp Temp SpO2   123/70 95 20 99.1 °F (37.3 °C) 100 %      MAP       --         Physical Exam    Nursing note and vitals reviewed.  Constitutional: She appears well-developed and well-nourished. No distress.   HENT:   Head: Normocephalic and atraumatic.   Right Ear: Tympanic membrane normal.   Left Ear: Tympanic membrane normal.   Nose: Nose normal.   Mouth/Throat: Uvula is midline, oropharynx is clear and moist and mucous membranes are normal.   Eyes: EOM are normal. Pupils are equal, round, and reactive to light.   Neck: Trachea normal, normal range of motion, full passive range of motion without pain and phonation normal. Neck supple. No stridor present. No spinous process tenderness and no muscular tenderness present. Normal range of motion present. No neck rigidity.   Cardiovascular: Normal rate, regular rhythm and normal heart sounds. Exam reveals no gallop and no friction rub.    No murmur heard.  Pulmonary/Chest: Effort normal and breath sounds normal. No respiratory distress. She has no wheezes. She has no rhonchi. She has no rales.   Abdominal: Soft. Bowel sounds are normal. There is no tenderness. There is no rebound and no guarding.   Musculoskeletal: Normal range of motion.   Neurological: She is alert and oriented to person,  place, and time. She has normal strength. No cranial nerve deficit or sensory deficit.   Skin: Skin is warm and dry. Capillary refill takes less than 2 seconds.   Psychiatric: She has a normal mood and affect.         ED Course   Procedures  Labs Reviewed   POCT URINE PREGNANCY   POCT INFLUENZA A/B MOLECULAR          Imaging Results          X-Ray Chest 1 View (Final result)  Result time 03/19/20 04:44:56    Final result by Jacky Loco MD (03/19/20 04:44:56)                 Impression:      No radiographic evidence of acute intrathoracic process.      Electronically signed by: Jacky Loco MD  Date:    03/19/2020  Time:    04:44             Narrative:    EXAMINATION:  XR CHEST 1 VIEW    CLINICAL HISTORY:  Cough    TECHNIQUE:  Single frontal view of the chest was performed.    COMPARISON:  None    FINDINGS:  The cardiomediastinal silhouette appears within normal limits.  The lungs are symmetrically expanded without evidence of large confluent airspace consolidation.  No evidence of pleural effusion or pneumothorax.  The visualized osseous structures appear grossly intact.                                 Medical Decision Making:   ED Management:  DDx:  Influenza, viral syndrome, COVID-19, strep pharyngitis, viral pharyngitis, otitis media, sinusitis, pneumonia, bronchitis, meningitis, sepsis, others    HPI and physical exam as above.      The patient appears to have a viral respiratory infection.  Based upon the history and physical exam the patient does not appear to have a serious bacterial infection such as sepsis, otitis media, bacterial sinusitis, strep pharyngitis, parapharyngeal or peritonsillar abscess, meningitis.  Influenza swab was negative.  Chest x-ray shows no acute cardiopulmonary processes.  Respiratory effort is normal without respiratory distress or tachypnea. Lungs are clear to auscultation bilaterally in all fields. Mucous membranes are moist and the patient is tolerating P.O. without  difficulty.  The patient will not be be tested for SARS-COV-2 given absence of concerning symptoms including fever or risk factors in compliance with current testing guidelines.  Patient is afebrile   Patient is nontoxic, alert, active, and appears very well at this time just prior to discharge.  Room air oxygen saturation 100%.  I have given specific return precautions to the patient regarding dyspnea.  I will prescribe medications to treat the patient's symptoms.     The results and physical exam findings were reviewed with the patient.  I advised the patient to remain home and self isolate from others for 2 weeks. The patient should wear a surgical mask at all times, especially if she must be around others.  Strict ED return precautions given concerning worsening shortness of breath/dyspnea. All questions regarding diagnosis and plan were answered to the patient's fullest possible satisfaction. Patient expressed understanding of diagnosis, discharge instructions, and return precautions.                                    Clinical Impression:       ICD-10-CM ICD-9-CM   1. Viral syndrome B34.9 079.99   2. Cough R05 786.2   3. Advice Given About Covid-19 Virus Infection Z71.89              ED Disposition Condition    Discharge Stable        ED Prescriptions     Medication Sig Dispense Start Date End Date Auth. Provider    benzonatate (TESSALON) 100 MG capsule Take 1 capsule (100 mg total) by mouth 3 (three) times daily as needed for Cough. 15 capsule 3/19/2020 3/29/2020 Eric Hatfield PA-C        Follow-up Information     Follow up With Specialties Details Why Contact Info    Charles Hein MD Pediatrics   [address] [place] [address]  [id number]  [facility] [address]  [phone]      [facility] Go in 1 day If symptoms worsen [id number]  [facility] [id number] [phone]                                     Eric Hatfield PA-C  03/19/20 0513

## 2020-04-15 ENCOUNTER — OFFICE VISIT (OUTPATIENT)
Dept: OBSTETRICS AND GYNECOLOGY | Facility: CLINIC | Age: 20
End: 2020-04-15
Payer: MEDICAID

## 2020-04-15 VITALS
BODY MASS INDEX: 28 KG/M2 | DIASTOLIC BLOOD PRESSURE: 62 MMHG | SYSTOLIC BLOOD PRESSURE: 110 MMHG | WEIGHT: 164 LBS | HEIGHT: 64 IN

## 2020-04-15 DIAGNOSIS — Z97.5 IUD (INTRAUTERINE DEVICE) IN PLACE: ICD-10-CM

## 2020-04-15 DIAGNOSIS — Z30.09 FAMILY PLANNING: Primary | ICD-10-CM

## 2020-04-15 DIAGNOSIS — N94.6 DYSMENORRHEA: ICD-10-CM

## 2020-04-15 PROCEDURE — 99213 OFFICE O/P EST LOW 20 MIN: CPT | Mod: S$PBB,,, | Performed by: OBSTETRICS & GYNECOLOGY

## 2020-04-15 PROCEDURE — 99999 PR PBB SHADOW E&M-EST. PATIENT-LVL III: CPT | Mod: PBBFAC,,, | Performed by: OBSTETRICS & GYNECOLOGY

## 2020-04-15 PROCEDURE — 99213 OFFICE O/P EST LOW 20 MIN: CPT | Mod: PBBFAC | Performed by: OBSTETRICS & GYNECOLOGY

## 2020-04-15 PROCEDURE — 99213 PR OFFICE/OUTPT VISIT, EST, LEVL III, 20-29 MIN: ICD-10-PCS | Mod: S$PBB,,, | Performed by: OBSTETRICS & GYNECOLOGY

## 2020-04-15 PROCEDURE — 99999 PR PBB SHADOW E&M-EST. PATIENT-LVL III: ICD-10-PCS | Mod: PBBFAC,,, | Performed by: OBSTETRICS & GYNECOLOGY

## 2020-04-15 RX ORDER — NAPROXEN 500 MG/1
500 TABLET ORAL 2 TIMES DAILY WITH MEALS
Qty: 60 TABLET | Refills: 1 | OUTPATIENT
Start: 2020-04-15 | End: 2020-09-29

## 2020-04-15 NOTE — PROGRESS NOTES
Subjective:       Patient ID: Fiona Le is a 19 y.o. female.    Chief Complaint:  Consult (Pt would like to change birth control method from Paragard to depo.)      History of Present Illness  HPI  Patient comes in today for family planning.  Considering removal of ParaGard.  It was placed on 3/31/2016 after the birth of her child.  Has noticed longer menses up to 10 days monthly on the IUD with pain.  Was wondering if it was because of the ParaGard.  Would like to consider Depo Provera.  Does not want to have another baby anytime soon.  No other complaint.      GYN & OB History  Patient's last menstrual period was 2020 (exact date).   Date of Last Pap: No result found    OB History    Para Term  AB Living   1 1 1     1   SAB TAB Ectopic Multiple Live Births         0 1      # Outcome Date GA Lbr Nahid/2nd Weight Sex Delivery Anes PTL Lv   1 Term 16 38w5d  3.738 kg (8 lb 3.9 oz) M Vag-Vacuum EPI N DOUG     Past Medical History:   Diagnosis Date    Asthma        History reviewed. No pertinent surgical history.    Family History   Problem Relation Age of Onset    Miscarriages / Stillbirths Mother     Hypertension Mother     Hypertension Maternal Grandmother     Diabetes Maternal Grandfather     Stroke Maternal Grandfather     Seizures Brother        Social History     Socioeconomic History    Marital status: Single     Spouse name: Not on file    Number of children: Not on file    Years of education: Not on file    Highest education level: Not on file   Occupational History    Not on file   Social Needs    Financial resource strain: Not on file    Food insecurity:     Worry: Not on file     Inability: Not on file    Transportation needs:     Medical: Not on file     Non-medical: Not on file   Tobacco Use    Smoking status: Never Smoker    Smokeless tobacco: Never Used   Substance and Sexual Activity    Alcohol use: No     Alcohol/week: 0.0 standard drinks    Drug use:  No    Sexual activity: Yes     Partners: Male     Birth control/protection: None   Lifestyle    Physical activity:     Days per week: Not on file     Minutes per session: Not on file    Stress: Not on file   Relationships    Social connections:     Talks on phone: Not on file     Gets together: Not on file     Attends Druze service: Not on file     Active member of club or organization: Not on file     Attends meetings of clubs or organizations: Not on file     Relationship status: Not on file   Other Topics Concern    Not on file   Social History Narrative    Together since 5/24/2014    Both graduated from [employer]    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       Current Outpatient Medications   Medication Sig Dispense Refill    copper (PARAGARD T 380A) 380 square mm IUD 1 Device by Intrauterine route continuous.      ibuprofen (ADVIL,MOTRIN) 600 MG tablet Take 1 tablet (600 mg total) by mouth every 6 (six) hours as needed for Pain. 20 tablet 0    naproxen (NAPROSYN) 500 MG tablet Take 1 tablet (500 mg total) by mouth 2 (two) times daily with meals. 60 tablet 0    peak flow meter Nellie Use meter once daily & after each treatment with asthma medication. If peak flow is less than 70% of baseline, go to nearest ER for care. 1 each 0     No current facility-administered medications for this visit.        Review of patient's allergies indicates:   Allergen Reactions    Fish containing products Rash       Review of Systems  Review of Systems   Constitutional: Negative for activity change, appetite change, chills, fatigue, fever and unexpected weight change.   HENT: Negative for mouth sores.    Respiratory: Negative for cough, shortness of breath and wheezing.    Cardiovascular: Negative for chest pain and palpitations.   Gastrointestinal: Negative for abdominal pain, bloating, blood in stool, constipation, nausea and vomiting.   Endocrine: Negative for diabetes and hot  flashes.   Genitourinary: Positive for dysmenorrhea and menorrhagia. Negative for dyspareunia, dysuria, frequency, hematuria, menstrual problem, pelvic pain, urgency, vaginal bleeding, vaginal discharge, vaginal pain, urinary incontinence, postcoital bleeding and vaginal odor.   Musculoskeletal: Negative for back pain and myalgias.   Integumentary:  Negative for rash, breast mass and nipple discharge.   Neurological: Negative for seizures and headaches.   Psychiatric/Behavioral: Negative for depression and sleep disturbance. The patient is not nervous/anxious.    Breast: Negative for mass, mastodynia and nipple discharge          Objective:    Physical Exam:   Constitutional: She appears well-developed and well-nourished. No distress.    HENT:   Head: Normocephalic and atraumatic.    Eyes: EOM are normal.    Neck: Normal range of motion.     Pulmonary/Chest: Effort normal. No respiratory distress.   Breasts: Non-tender, no engorgement, no masses, no retraction, no discharge. Negative for lymphadenopathy.         Abdominal: Soft. She exhibits no distension. There is no tenderness. There is no rebound and no guarding.     Genitourinary: Vagina normal and uterus normal. No vaginal discharge found.   Genitourinary Comments: Vulva without any obvious lesions.  Urethral meatus normal size and location without any lesion.  Urethra is non-tender without stricture or discharge.  Bladder is non-tender.  Vaginal vault with good support.  Minimal white discharge noted.  No obvious lesion.  Normal rugation.  Cervix is without any cervical motion tenderness.  No obvious lesion.  ParaGard strings visible.  Uterus is small, non-tender, normal contour.  Adnexa is without any masses or tenderness.  Perineum without obvious lesion.             Musculoskeletal: Normal range of motion.       Neurological: She is alert.    Skin: Skin is warm and dry.    Psychiatric: She has a normal mood and affect.          Assessment:        1. Family  planning    2. IUD (intrauterine device) in place    3.  Dysmenorrhea          Plan:      I have discussed with the patient regarding her condition  She is doing well so far with the ParaGard  I doubt if Depo Provera would be a better fit for her  We discussed possible weight gain and amenorrhea on the injection.  After discussion, she would like to stay with the ParaGard for now.    Naproxen given to take around her menses for dysmenorrhea    Back in one year.

## 2020-08-18 ENCOUNTER — OFFICE VISIT (OUTPATIENT)
Dept: OBSTETRICS AND GYNECOLOGY | Facility: CLINIC | Age: 20
End: 2020-08-18
Payer: MEDICAID

## 2020-08-18 VITALS
SYSTOLIC BLOOD PRESSURE: 124 MMHG | WEIGHT: 174.81 LBS | DIASTOLIC BLOOD PRESSURE: 66 MMHG | HEIGHT: 64 IN | TEMPERATURE: 98 F | BODY MASS INDEX: 29.84 KG/M2

## 2020-08-18 DIAGNOSIS — Z30.09 FAMILY PLANNING: ICD-10-CM

## 2020-08-18 DIAGNOSIS — Z30.432 ENCOUNTER FOR IUD REMOVAL: Primary | ICD-10-CM

## 2020-08-18 PROCEDURE — 58301 REMOVE INTRAUTERINE DEVICE: CPT | Mod: S$PBB,,, | Performed by: OBSTETRICS & GYNECOLOGY

## 2020-08-18 PROCEDURE — 99213 OFFICE O/P EST LOW 20 MIN: CPT | Mod: PBBFAC,25 | Performed by: OBSTETRICS & GYNECOLOGY

## 2020-08-18 PROCEDURE — 99213 PR OFFICE/OUTPT VISIT, EST, LEVL III, 20-29 MIN: ICD-10-PCS | Mod: S$PBB,25,, | Performed by: OBSTETRICS & GYNECOLOGY

## 2020-08-18 PROCEDURE — 99213 OFFICE O/P EST LOW 20 MIN: CPT | Mod: S$PBB,25,, | Performed by: OBSTETRICS & GYNECOLOGY

## 2020-08-18 PROCEDURE — 99999 PR PBB SHADOW E&M-EST. PATIENT-LVL III: ICD-10-PCS | Mod: PBBFAC,,, | Performed by: OBSTETRICS & GYNECOLOGY

## 2020-08-18 PROCEDURE — 99999 PR PBB SHADOW E&M-EST. PATIENT-LVL III: CPT | Mod: PBBFAC,,, | Performed by: OBSTETRICS & GYNECOLOGY

## 2020-08-18 PROCEDURE — 58301 REMOVE INTRAUTERINE DEVICE: CPT | Mod: PBBFAC | Performed by: OBSTETRICS & GYNECOLOGY

## 2020-08-18 PROCEDURE — 58301 PR REMOVE, INTRAUTERINE DEVICE: ICD-10-PCS | Mod: S$PBB,,, | Performed by: OBSTETRICS & GYNECOLOGY

## 2020-08-18 RX ORDER — LEVONORGESTREL AND ETHINYL ESTRADIOL 0.1-0.02MG
1 KIT ORAL DAILY
Qty: 30 TABLET | Refills: 6 | Status: SHIPPED | OUTPATIENT
Start: 2020-08-18 | End: 2020-12-14

## 2020-08-18 NOTE — CARE UPDATE
IUD REMOVAL NOTES    We begins with a consultation.  Her IUD has an expiration date of 2026.  It was 2026 time for removal.  Before removing the IUD, we have obtained consent from the patient after explaining the procedure.  Other alternative methods of contraception also discussed.  A clean speculum was then placed.  The cervix and upper vaginal vault were cleaned of mucous and debris.  The strings were clearly seen.  They were grasped with ring forceps.  Slight traction was applied steadily until the IUD was removed.  The device was then shown to the patient.  The patient tolerated the procedure well without any incident.    Postprocedure pain was rated at 1/10.   Educational material was given.  She was told to use another method of contraception.  She decided to get OCP.  Prescription for Alesse given.

## 2020-08-18 NOTE — PROGRESS NOTES
Subjective:       Patient ID: Fiona Le is a 20 y.o. female.    Chief Complaint:  Consult (Consult on IUD removal. IUD causing too much pain. )      History of Present Illness  HPI  Patient comes in today requesting IUD removal again.  Has noticed longer menses up to 10 days monthly on the IUD with pain.  Severe.  Crying  Seen in [place] this year.  Agreed to keep the device in for a little longer.  But she would like it out today.    GYN & OB History  Patient's last menstrual period was 07/15/2020 (within days).   Date of Last Pap: No result found    OB History    Para Term  AB Living   1 1 1     1   SAB TAB Ectopic Multiple Live Births         0 1      # Outcome Date GA Lbr Nahid/2nd Weight Sex Delivery Anes PTL Lv   1 Term 16 38w5d  3.738 kg (8 lb 3.9 oz) M Vag-Vacuum EPI N DOUG     Past Medical History:   Diagnosis Date    Asthma        History reviewed. No pertinent surgical history.    Family History   Problem Relation Age of Onset    Miscarriages / Stillbirths Mother     Hypertension Mother     Hypertension Maternal Grandmother     Diabetes Maternal Grandfather     Stroke Maternal Grandfather     Seizures Brother        Social History     Socioeconomic History    Marital status: Single     Spouse name: Not on file    Number of children: Not on file    Years of education: Not on file    Highest education level: Not on file   Occupational History    Not on file   Social Needs    Financial resource strain: Not on file    Food insecurity     Worry: Not on file     Inability: Not on file    Transportation needs     Medical: Not on file     Non-medical: Not on file   Tobacco Use    Smoking status: Never Smoker    Smokeless tobacco: Never Used   Substance and Sexual Activity    Alcohol use: No     Alcohol/week: 0.0 standard drinks    Drug use: No    Sexual activity: Yes     Partners: Male     Birth control/protection: None   Lifestyle    Physical activity     Days per  week: Not on file     Minutes per session: Not on file    Stress: Not on file   Relationships    Social connections     Talks on phone: Not on file     Gets together: Not on file     Attends Anabaptist service: Not on file     Active member of club or organization: Not on file     Attends meetings of clubs or organizations: Not on file     Relationship status: Not on file   Other Topics Concern    Not on file   Social History Narrative    Together since 5/24/2014    Both graduated from [employer]    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       Current Outpatient Medications   Medication Sig Dispense Refill    copper (PARAGARD T 380A) 380 square mm IUD 1 Device by Intrauterine route continuous.      ibuprofen (ADVIL,MOTRIN) 600 MG tablet Take 1 tablet (600 mg total) by mouth every 6 (six) hours as needed for Pain. 20 tablet 0    naproxen (NAPROSYN) 500 MG tablet Take 1 tablet (500 mg total) by mouth 2 (two) times daily with meals. 60 tablet 1    peak flow meter Nellie Use meter once daily & after each treatment with asthma medication. If peak flow is less than 70% of baseline, go to nearest ER for care. 1 each 0    levonorgestrel-ethinyl estradiol (AVIANE,ALESSE,LESSINA) 0.1-20 mg-mcg per tablet Take 1 tablet by mouth once daily. 30 tablet 6     No current facility-administered medications for this visit.        Review of patient's allergies indicates:   Allergen Reactions    Fish containing products Rash       Review of Systems  Review of Systems   Constitutional: Negative for activity change, appetite change, chills, fatigue, fever and unexpected weight change.   HENT: Negative for mouth sores.    Respiratory: Negative for cough, shortness of breath and wheezing.    Cardiovascular: Negative for chest pain and palpitations.   Gastrointestinal: Negative for abdominal pain, bloating, blood in stool, constipation, nausea and vomiting.   Endocrine: Negative for diabetes and hot  flashes.   Genitourinary: Positive for dysmenorrhea and menorrhagia. Negative for dyspareunia, dysuria, frequency, hematuria, menstrual problem, pelvic pain, urgency, vaginal bleeding, vaginal discharge, vaginal pain, urinary incontinence, postcoital bleeding and vaginal odor.   Musculoskeletal: Negative for back pain and myalgias.   Integumentary:  Negative for rash, breast mass and nipple discharge.   Neurological: Negative for seizures and headaches.   Psychiatric/Behavioral: Negative for depression and sleep disturbance. The patient is not nervous/anxious.    Breast: Negative for mass, mastodynia and nipple discharge          Objective:    Physical Exam:   Constitutional: She appears well-developed and well-nourished. No distress.    HENT:   Head: Normocephalic and atraumatic.    Eyes: EOM are normal.    Neck: Normal range of motion.     Pulmonary/Chest: Effort normal. No respiratory distress.        Abdominal: Soft. She exhibits no distension. There is no abdominal tenderness. There is no rebound and no guarding.     Genitourinary:    Vagina and uterus normal.      Genitourinary Comments: Vulva without any obvious lesions.  Urethral meatus normal size and location without any lesion.  Urethra is non-tender without stricture or discharge.  Bladder is non-tender.  Vaginal vault with good support.  Minimal white discharge noted.  No obvious lesion.  Normal rugation.  Cervix is without any cervical motion tenderness.  No obvious lesion.  ParaGard strings visible.  Uterus is small, non-tender, normal contour.  Adnexa is without any masses or tenderness.  Perineum without obvious lesion.     negative for vaginal discharge          Musculoskeletal: Normal range of motion.       Neurological: She is alert.    Skin: Skin is warm and dry.    Psychiatric: She has a normal mood and affect.          Assessment:        1. Encounter for IUD removal    2. Family planning             Plan:      I have also discussed with the  patient regarding her contraceptive options.  Risks and benefits of all discussed including oral contraceptives, Depo-Provera, OrthoEvra, NuvaRing, Mirena/ParaGard, Implanon, sterilization. After extensive dicussion, the patient wishes to have oral contraceptive pills.  Risks and benefits again discussed along with how to use and when to start.  All of her questions were answered appropriately to her satisfaction.        Will remove IUD today  Alesse to be started today    Back in 3 months.

## 2020-09-29 ENCOUNTER — HOSPITAL ENCOUNTER (EMERGENCY)
Facility: HOSPITAL | Age: 20
Discharge: HOME | End: 2020-09-29
Attending: EMERGENCY MEDICINE
Payer: MEDICAID

## 2020-09-29 VITALS
SYSTOLIC BLOOD PRESSURE: 117 MMHG | TEMPERATURE: 98 F | DIASTOLIC BLOOD PRESSURE: 72 MMHG | RESPIRATION RATE: 18 BRPM | HEIGHT: 64 IN | WEIGHT: 172 LBS | OXYGEN SATURATION: 98 % | HEART RATE: 86 BPM | BODY MASS INDEX: 29.37 KG/M2

## 2020-09-29 DIAGNOSIS — J02.9 SORE THROAT: Primary | ICD-10-CM

## 2020-09-29 DIAGNOSIS — J02.9 VIRAL PHARYNGITIS: ICD-10-CM

## 2020-09-29 LAB
B-HCG UR QL: NEGATIVE
CTP QC/QA: YES
GROUP A STREP, MOLECULAR: NEGATIVE

## 2020-09-29 PROCEDURE — 99284 EMERGENCY DEPT VISIT MOD MDM: CPT | Mod: 25

## 2020-09-29 PROCEDURE — 87651 STREP A DNA AMP PROBE: CPT

## 2020-09-29 PROCEDURE — 81025 URINE PREGNANCY TEST: CPT | Performed by: NURSE PRACTITIONER

## 2020-09-29 PROCEDURE — 25000003 PHARM REV CODE 250: Performed by: NURSE PRACTITIONER

## 2020-09-29 PROCEDURE — 96372 THER/PROPH/DIAG INJ SC/IM: CPT | Mod: 25

## 2020-09-29 PROCEDURE — 63600175 PHARM REV CODE 636 W HCPCS: Performed by: NURSE PRACTITIONER

## 2020-09-29 RX ORDER — NAPROXEN 500 MG/1
500 TABLET ORAL 2 TIMES DAILY PRN
Qty: 20 TABLET | Refills: 0 | Status: SHIPPED | OUTPATIENT
Start: 2020-09-29 | End: 2020-12-14

## 2020-09-29 RX ORDER — HYDROCODONE BITARTRATE AND ACETAMINOPHEN 5; 325 MG/1; MG/1
1 TABLET ORAL
Status: COMPLETED | OUTPATIENT
Start: 2020-09-29 | End: 2020-09-29

## 2020-09-29 RX ORDER — DIPHENHYDRAMINE HCL 25 MG
25 CAPSULE ORAL
Status: COMPLETED | OUTPATIENT
Start: 2020-09-29 | End: 2020-09-29

## 2020-09-29 RX ORDER — DEXAMETHASONE SODIUM PHOSPHATE 4 MG/ML
12 INJECTION, SOLUTION INTRA-ARTICULAR; INTRALESIONAL; INTRAMUSCULAR; INTRAVENOUS; SOFT TISSUE
Status: COMPLETED | OUTPATIENT
Start: 2020-09-29 | End: 2020-09-29

## 2020-09-29 RX ORDER — DIPHENHYDRAMINE HCL 25 MG
25 CAPSULE ORAL EVERY 6 HOURS PRN
Qty: 20 CAPSULE | Refills: 0 | Status: SHIPPED | OUTPATIENT
Start: 2020-09-29 | End: 2020-12-14

## 2020-09-29 RX ORDER — PREDNISONE 20 MG/1
40 TABLET ORAL DAILY
Qty: 8 TABLET | Refills: 0 | Status: SHIPPED | OUTPATIENT
Start: 2020-09-30 | End: 2020-10-04

## 2020-09-29 RX ADMIN — DIPHENHYDRAMINE HYDROCHLORIDE 25 MG: 25 CAPSULE ORAL at 08:09

## 2020-09-29 RX ADMIN — HYDROCODONE BITARTRATE AND ACETAMINOPHEN 1 TABLET: 5; 325 TABLET ORAL at 07:09

## 2020-09-29 RX ADMIN — DEXAMETHASONE SODIUM PHOSPHATE 12 MG: 4 INJECTION, SOLUTION INTRAMUSCULAR; INTRAVENOUS at 08:09

## 2020-09-29 NOTE — ED PROVIDER NOTES
"Encounter Date: 9/29/2020    SCRIBE #1 NOTE: I, Lynette Mooney, am scribing for, and in the presence of,  PHILLY Rae. I have scribed the following portions of the note - Other sections scribed: HPI, ROS, PE.       History     Chief Complaint   Patient presents with    Sore Throat     Patient c/o sore throat, swelling of tonsils, and unable to swallow spit x 1 day.  Patient refusing to speak in triage because she reports "it hurts too bad."  No acute distress.     This is a 20 y.o female who has Asthma presents to the ED for an evaluation for a sore throat x 1 day.  She reports the sore throat is worse with swallowing. Patient's boyfriend is concerned that her sore throat may be a result of an allergic reaction after eating fish on yesterday.  He reports the patient having an allergy to fish.  He reports giving her Benadryl on yesterday, which she reports initially provided relief; however, this morning the patient reports the pain has persisted.  She denies fever, chills, nausea, emesis, abdominal pain, chest pain, shortness of breath, rash, or any other associated symptoms.      The history is provided by the patient and a significant other.     Review of patient's allergies indicates:   Allergen Reactions    Fish containing products Rash     Past Medical History:   Diagnosis Date    Asthma      History reviewed. No pertinent surgical history.  Family History   Problem Relation Age of Onset    Miscarriages / Stillbirths Mother     Hypertension Mother     Hypertension Maternal Grandmother     Diabetes Maternal Grandfather     Stroke Maternal Grandfather     Seizures Brother      Social History     Tobacco Use    Smoking status: Never Smoker    Smokeless tobacco: Never Used   Substance Use Topics    Alcohol use: No     Alcohol/week: 0.0 standard drinks    Drug use: No     Review of Systems   Constitutional: Negative for chills and fever.   HENT: Positive for sore throat. Negative for congestion, " ear pain and rhinorrhea.    Eyes: Negative for pain and visual disturbance.   Respiratory: Negative for cough and shortness of breath.    Cardiovascular: Negative for chest pain.   Gastrointestinal: Negative for abdominal pain, diarrhea, nausea and vomiting.   Genitourinary: Negative for dysuria.   Musculoskeletal: Negative for back pain and neck pain.   Skin: Negative for rash.   Neurological: Negative for headaches.       Physical Exam     Initial Vitals [09/29/20 0629]   BP Pulse Resp Temp SpO2   (!) 124/56 102 (!) 22 98.6 °F (37 °C) 97 %      MAP       --         Physical Exam    Nursing note and vitals reviewed.  Constitutional: She appears well-developed and well-nourished. She is not diaphoretic. No distress.   HENT:   Head: Normocephalic and atraumatic.   Right Ear: Hearing, tympanic membrane, external ear and ear canal normal.   Left Ear: Hearing, tympanic membrane, external ear and ear canal normal.   Nose: Nose normal.   Mouth/Throat: Uvula is midline and mucous membranes are normal. Posterior oropharyngeal erythema present. No oropharyngeal exudate.   No posterior oropharyngeal edema.  No exudates.  Speaking in full and clear sentences without any difficulty.  No voice change.  No drooling.  No stridor.   Eyes: Conjunctivae, EOM and lids are normal. Pupils are equal, round, and reactive to light.   Neck: Neck supple.   Cardiovascular: Normal rate, regular rhythm, normal heart sounds and intact distal pulses.   Pulmonary/Chest: Breath sounds normal.   Abdominal: Soft. There is no abdominal tenderness.   Musculoskeletal: No edema.   Neurological: She is alert and oriented to person, place, and time.   Skin: Skin is warm and dry.   Psychiatric: She has a normal mood and affect.         ED Course   Procedures  Labs Reviewed   GROUP A STREP, MOLECULAR   POCT URINE PREGNANCY          Imaging Results    None          Medical Decision Making:   ED Management:  This is an evaluation of a 20 y.o. female that  presents to the Emergency Department for sore throat. The patient is a non-toxic, afebrile, and well appearing female. On physical exam, there is a midline uvula, throat is mildly erythematous; she has no drooling, hoarseness, trismus, facial swelling, meningeal signs; no findings to suggest peritonsillar or retropharyngeal abscess, epiglottitis, or airway compromise. There is no cervical lymphadenopathy. TM's WNL. Breath sounds clear and equal to ascultation bilaterally.      Vital Signs Are Reassuring.   Rapid Strep Test: Negative.     Likely viral pharyngitis, however given concern of allergic reaction, will place patient on short course oral steroids and Benadryl p.r.n.     Given the above findings, my overall impression is viral pharyngitis. In addition, I do not think the patient has strep pharyngitis based on [facility] Criteria and a Negative Rapid Strep Test. However a reflex strep culture is pending. I do not suspect OM, OE, peritonsillar abscess, retropharyngeal abscess, epiglotitis, meningitis, or airway compromise.    ED Course: norco ,decadron, benadryl. DC Meds:  Ibuprofen, prednisone, Benadryl. Home Care Recommendations: OTC medications for symptomatic relief, sore throat self care DC instructions. The diagnosis, treatment plan, instructions for follow-up and reevaluation with Primary Care as well as ED return precautions have been discussed with the patient and understanding of the information was verbalized. All questions or concerns from the patient have been addressed.               Scribe Attestation:   Scribe #1: I performed the above scribed service and the documentation accurately describes the services I performed. I attest to the accuracy of the note.                      Clinical Impression:     ICD-10-CM ICD-9-CM   1. Sore throat  J02.9 462   2. Viral pharyngitis  J02.9 462                      Disposition:   Disposition: Discharged  Condition: Stable     ED Disposition Condition    Discharge  Stable        ED Prescriptions     Medication Sig Dispense Start Date End Date Auth. Provider    naproxen (NAPROSYN) 500 MG tablet Take 1 tablet (500 mg total) by mouth 2 (two) times daily as needed (pain). Take with food 20 tablet 9/29/2020  Roslyn Soto NP    diphenhydrAMINE (BENADRYL) 25 mg capsule Take 1 capsule (25 mg total) by mouth every 6 (six) hours as needed for Itching or Allergies. 20 capsule 9/29/2020  Roslyn Soto NP    predniSONE (DELTASONE) 20 MG tablet Take 2 tablets (40 mg total) by mouth once daily. for 4 days 8 tablet 9/30/2020 10/4/2020 Roslyn Soto NP        Follow-up Information     Follow up With Specialties Details Why Contact Info    Charles Hein MD Pediatrics Schedule an appointment as soon as possible for a visit  For follow-up [address] [place] [address]  [address]  [place] [address]  [phone]      [facility] Emergency Medicine Go to  If symptoms worsen [address] [place], [place] [address]  [facility] [address] [phone]                              IHECTOR, personally performed the services described in this documentation. All medical record entries made by the scribe were at my direction and in my presence. I have reviewed the chart and agree that the record reflects my personal performance and is accurate and complete.         Roslyn Soto NP  09/29/20 0743

## 2020-09-29 NOTE — DISCHARGE INSTRUCTIONS

## 2020-12-14 ENCOUNTER — OFFICE VISIT (OUTPATIENT)
Dept: OBSTETRICS AND GYNECOLOGY | Facility: CLINIC | Age: 20
End: 2020-12-14
Payer: MEDICAID

## 2020-12-14 VITALS
BODY MASS INDEX: 28.98 KG/M2 | HEIGHT: 64 IN | WEIGHT: 169.75 LBS | DIASTOLIC BLOOD PRESSURE: 70 MMHG | SYSTOLIC BLOOD PRESSURE: 110 MMHG

## 2020-12-14 DIAGNOSIS — Z3A.01 7 WEEKS GESTATION OF PREGNANCY: ICD-10-CM

## 2020-12-14 DIAGNOSIS — Z32.00 VISIT FOR CONFIRMATION OF PREGNANCY TEST RESULT WITH PHYSICAL EXAM: Primary | ICD-10-CM

## 2020-12-14 LAB
B-HCG UR QL: POSITIVE
CTP QC/QA: YES

## 2020-12-14 PROCEDURE — 99999 PR PBB SHADOW E&M-EST. PATIENT-LVL III: ICD-10-PCS | Mod: PBBFAC,,, | Performed by: OBSTETRICS & GYNECOLOGY

## 2020-12-14 PROCEDURE — 99999 PR PBB SHADOW E&M-EST. PATIENT-LVL III: CPT | Mod: PBBFAC,,, | Performed by: OBSTETRICS & GYNECOLOGY

## 2020-12-14 PROCEDURE — 99213 OFFICE O/P EST LOW 20 MIN: CPT | Mod: S$PBB,,, | Performed by: OBSTETRICS & GYNECOLOGY

## 2020-12-14 PROCEDURE — 87491 CHLMYD TRACH DNA AMP PROBE: CPT

## 2020-12-14 PROCEDURE — 99213 OFFICE O/P EST LOW 20 MIN: CPT | Mod: PBBFAC | Performed by: OBSTETRICS & GYNECOLOGY

## 2020-12-14 PROCEDURE — 99213 PR OFFICE/OUTPT VISIT, EST, LEVL III, 20-29 MIN: ICD-10-PCS | Mod: S$PBB,,, | Performed by: OBSTETRICS & GYNECOLOGY

## 2020-12-14 RX ORDER — .BETA.-CAROTENE, ASCORBIC ACID, CHOLECALCIFEROL, .ALPHA.-TOCOPHEROL ACETATE, DL-, THIAMINE MONONITRATE, RIBOFLAVIN, NIACINAMIDE, PYRIDOXINE HYDROCHLORIDE, FOLIC ACID, CYANOCOBALAMIN, BIOTIN, CALCIUM PANTOTHENATE, CALCIUM CARBONATE, FERROUS FUMARATE, POTASSIUM IODIDE, MAGNESIUM OXIDE, ZINC OXIDE AND CUPRIC OXIDE 2600; 75; 450; 10; 3; 3.5; 21; 21; 1; 13; 330; 6; 100; 26; 150; 25; 15; 1.5 [IU]/1; MG/1; [IU]/1; [IU]/1; MG/1; MG/1; MG/1; MG/1; MG/1; UG/1; UG/1; MG/1; MG/1; MG/1; UG/1; MG/1; MG/1; MG/1
1 TABLET, COATED ORAL DAILY
Qty: 30 TABLET | Refills: 6 | COMMUNITY
Start: 2020-12-14 | End: 2021-05-23 | Stop reason: SDUPTHER

## 2020-12-14 NOTE — PROGRESS NOTES
Subjective:       Patient ID: Fiona Le is a 20 y.o. female.    Chief Complaint:  Confirmation (UPT- Positive  LMP: 10/20/20 [place]: 2021 EGA: 7w 6d)      History of Present Illness  HPI  Patient has a positive home urine pregnancy test on gi, or 2020. She believes she could be pregnant. Pregnancy is desired. Sexual Activity: single partner, contraception: none. Current symptoms also include: breast tenderness, fatigue, frequent urination, nausea and positive home pregnancy test. Last period was normal.     Patient's last menstrual period was 10/20/2020 (exact date).     By date, she should be about 7w6d with EDC on 2020    History of pregnancy-induced hypertension at term      GYN & OB History  Patient's last menstrual period was 10/20/2020 (exact date).   Date of Last Pap: No result found    OB History    Para Term  AB Living   2 1 1     1   SAB TAB Ectopic Multiple Live Births         0 1      # Outcome Date GA Lbr Nahid/2nd Weight Sex Delivery Anes PTL Lv   2 Current            1 Term 16 38w5d  3.738 kg (8 lb 3.9 oz) M Vag-Vacuum EPI N DOUG     Past Medical History:   Diagnosis Date    Asthma        History reviewed. No pertinent surgical history.    Family History   Problem Relation Age of Onset    Miscarriages / Stillbirths Mother     Hypertension Mother     Hypertension Maternal Grandmother     Diabetes Maternal Grandfather     Stroke Maternal Grandfather     Seizures Brother        Social History     Socioeconomic History    Marital status: Single     Spouse name: Not on file    Number of children: Not on file    Years of education: Not on file    Highest education level: Not on file   Occupational History    Not on file   Social Needs    Financial resource strain: Not on file    Food insecurity     Worry: Not on file     Inability: Not on file    Transportation needs     Medical: Not on file     Non-medical: Not on file   Tobacco Use    Smoking  status: Never Smoker    Smokeless tobacco: Never Used   Substance and Sexual Activity    Alcohol use: No     Alcohol/week: 0.0 standard drinks    Drug use: No    Sexual activity: Yes     Partners: Male     Birth control/protection: None   Lifestyle    Physical activity     Days per week: Not on file     Minutes per session: Not on file    Stress: Not on file   Relationships    Social connections     Talks on phone: Not on file     Gets together: Not on file     Attends Caodaism service: Not on file     Active member of club or organization: Not on file     Attends meetings of clubs or organizations: Not on file     Relationship status: Not on file   Other Topics Concern    Not on file   Social History Narrative    Together since 5/24/2014    Both graduated from [employer]    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 30 tablet 6     No current facility-administered medications for this visit.        Review of patient's allergies indicates:   Allergen Reactions    Fish containing products Rash       Review of Systems  Review of Systems   Constitutional: Positive for fatigue. Negative for activity change, appetite change, fever and unexpected weight change.   Respiratory: Negative for cough, shortness of breath and wheezing.    Cardiovascular: Negative for chest pain and palpitations.   Gastrointestinal: Positive for nausea. Negative for abdominal pain and vomiting.   Endocrine: Negative for hot flashes.   Genitourinary: Positive for frequency, pelvic pain and vaginal discharge. Negative for dysmenorrhea, dyspareunia, urgency, vaginal bleeding and postcoital bleeding.   Musculoskeletal: Negative for back pain and myalgias.   Integumentary:  Negative for rash, breast mass and nipple discharge.   Neurological:  Positive for headaches. Negative for seizures.   Psychiatric/Behavioral: Negative for depression and sleep disturbance. The patient is not nervous/anxious.    Breast: Negative for mass, mastodynia and nipple discharge          Objective:    Physical Exam:   Constitutional: She appears well-developed and well-nourished. No distress.   BMI of 29    HENT:   Head: Normocephalic and atraumatic.    Eyes: EOM are normal.    Neck: Normal range of motion.     Pulmonary/Chest: Effort normal. No respiratory distress.   Breasts: Non-tender, no engorgement, no masses, no retraction, no discharge. Negative for lymphadenopathy.         Abdominal: Soft. She exhibits no distension. There is no abdominal tenderness. There is no rebound and no guarding.     Genitourinary:    Vagina normal.      Genitourinary Comments: Vulva without any obvious lesions.  Urethral meatus normal size and location without any lesion.  Urethra is non-tender without stricture or discharge.  Bladder is non-tender.  Vaginal vault with good support.  Minimal white discharge noted.  No obvious lesion.  Normal rugation.  Cervix is without any cervical motion tenderness.  No obvious lesion.  Uterus isabout 8 weeks, non-tender, normal contour.  Adnexa is without any masses or tenderness.  Perineum without obvious lesion.     negative for vaginal discharge          Musculoskeletal: Normal range of motion.       Neurological: She is alert.    Skin: Skin is warm and dry.    Psychiatric: She has a normal mood and affect.          Assessment:        1. Visit for confirmation of pregnancy test result with physical exam    2. 7 weeks gestation of pregnancy    3.  History of PIH.         Plan:      I have discussed with the patient her condition  She is doing well so far in her early pregnancy  She is NOT taking over-the-counter prenatal vitamins; Prenate Elite prescribed.  Gonorrhea and chlamydia performed  We will contact her insurance for maternity benefits  She will  be back in about 2-4 weeks for follow-up  MFM ultrasound requested

## 2020-12-15 ENCOUNTER — TELEPHONE (OUTPATIENT)
Dept: OBSTETRICS AND GYNECOLOGY | Facility: CLINIC | Age: 20
End: 2020-12-15

## 2020-12-15 LAB
C TRACH DNA SPEC QL NAA+PROBE: NOT DETECTED
N GONORRHOEA DNA SPEC QL NAA+PROBE: NOT DETECTED

## 2020-12-15 NOTE — TELEPHONE ENCOUNTER
----- Message from Barby Fermin sent at 12/15/2020  8:33 AM CST -----  Regarding: Patient Call Back  Contact: Patient  Type: Patient Call Back    Who called: Patient     What is the request in detail: Pt is requesting the status of the her prenatal vit 36-iron-folate 6 (PRENATE ELITE) 26 mg iron- 1 mg Tab    Can the clinic reply by [facility]?    Would the patient rather a call back or a response via [facility]? Call back     Best call back number: [phone]        Refill for prenatals have been sent to her pharmacy listed in her chart

## 2020-12-21 ENCOUNTER — PROCEDURE VISIT (OUTPATIENT)
Dept: MATERNAL FETAL MEDICINE | Facility: CLINIC | Age: 20
End: 2020-12-21
Payer: MEDICAID

## 2020-12-21 DIAGNOSIS — Z32.00 VISIT FOR CONFIRMATION OF PREGNANCY TEST RESULT WITH PHYSICAL EXAM: ICD-10-CM

## 2020-12-21 DIAGNOSIS — Z32.01 POSITIVE URINE PREGNANCY TEST: ICD-10-CM

## 2020-12-21 DIAGNOSIS — Z3A.01 7 WEEKS GESTATION OF PREGNANCY: ICD-10-CM

## 2020-12-21 PROCEDURE — 76817 TRANSVAGINAL US OBSTETRIC: CPT | Mod: PBBFAC,PO | Performed by: OBSTETRICS & GYNECOLOGY

## 2020-12-21 PROCEDURE — 76817 TRANSVAGINAL US OBSTETRIC: CPT | Mod: 26,S$PBB,, | Performed by: OBSTETRICS & GYNECOLOGY

## 2020-12-21 PROCEDURE — 76817 PR US, OB, TRANSVAG APPROACH: ICD-10-PCS | Mod: 26,S$PBB,, | Performed by: OBSTETRICS & GYNECOLOGY

## 2020-12-28 ENCOUNTER — TELEPHONE (OUTPATIENT)
Dept: OBSTETRICS AND GYNECOLOGY | Facility: CLINIC | Age: 20
End: 2020-12-28

## 2020-12-28 NOTE — TELEPHONE ENCOUNTER
----- Message from Norma Lara sent at 12/23/2020  9:36 AM CST -----  [phone]  Type: Patient Call Back    Who called:[facility] love [facility] [phone]    What is the request in detail:requesting letter in order for dilation. Call pt. Fax no [phone]    [facility]    Would the patient rather a call back or a [facility]? call    Best call back number:dr [facility] love [facility] [phone]    Additional Information:        Patient stated she got her eye exam completed

## 2021-01-11 ENCOUNTER — LAB VISIT (OUTPATIENT)
Dept: LAB | Facility: HOSPITAL | Age: 21
End: 2021-01-11
Attending: OBSTETRICS & GYNECOLOGY
Payer: MEDICAID

## 2021-01-11 ENCOUNTER — INITIAL PRENATAL (OUTPATIENT)
Dept: OBSTETRICS AND GYNECOLOGY | Facility: CLINIC | Age: 21
End: 2021-01-11
Payer: MEDICAID

## 2021-01-11 VITALS
BODY MASS INDEX: 29.06 KG/M2 | SYSTOLIC BLOOD PRESSURE: 120 MMHG | DIASTOLIC BLOOD PRESSURE: 68 MMHG | WEIGHT: 169.31 LBS

## 2021-01-11 DIAGNOSIS — Z87.59 HISTORY OF PREGNANCY INDUCED HYPERTENSION: ICD-10-CM

## 2021-01-11 DIAGNOSIS — Z3A.10 10 WEEKS GESTATION OF PREGNANCY: Primary | ICD-10-CM

## 2021-01-11 DIAGNOSIS — Z34.91 FIRST TRIMESTER PREGNANCY: ICD-10-CM

## 2021-01-11 DIAGNOSIS — Z3A.10 10 WEEKS GESTATION OF PREGNANCY: ICD-10-CM

## 2021-01-11 LAB
ABO + RH BLD: NORMAL
ALBUMIN SERPL BCP-MCNC: 3.7 G/DL (ref 3.5–5.2)
ALP SERPL-CCNC: 53 U/L (ref 55–135)
ALT SERPL W/O P-5'-P-CCNC: 15 U/L (ref 10–44)
ANION GAP SERPL CALC-SCNC: 7 MMOL/L (ref 8–16)
AST SERPL-CCNC: 19 U/L (ref 10–40)
BASOPHILS # BLD AUTO: 0.03 K/UL (ref 0–0.2)
BASOPHILS NFR BLD: 0.4 % (ref 0–1.9)
BILIRUB SERPL-MCNC: 0.4 MG/DL (ref 0.1–1)
BLD GP AB SCN CELLS X3 SERPL QL: NORMAL
BUN SERPL-MCNC: 6 MG/DL (ref 6–20)
CALCIUM SERPL-MCNC: 9 MG/DL (ref 8.7–10.5)
CHLORIDE SERPL-SCNC: 106 MMOL/L (ref 95–110)
CO2 SERPL-SCNC: 24 MMOL/L (ref 23–29)
CREAT SERPL-MCNC: 0.7 MG/DL (ref 0.5–1.4)
CREAT UR-MCNC: 47.9 MG/DL (ref 15–325)
DIFFERENTIAL METHOD: ABNORMAL
EOSINOPHIL # BLD AUTO: 0.1 K/UL (ref 0–0.5)
EOSINOPHIL NFR BLD: 0.7 % (ref 0–8)
ERYTHROCYTE [DISTWIDTH] IN BLOOD BY AUTOMATED COUNT: 13.1 % (ref 11.5–14.5)
EST. GFR  (AFRICAN AMERICAN): >60 ML/MIN/1.73 M^2
EST. GFR  (NON AFRICAN AMERICAN): >60 ML/MIN/1.73 M^2
ESTIMATED AVG GLUCOSE: 105 MG/DL (ref 68–131)
GLUCOSE SERPL-MCNC: 84 MG/DL (ref 70–110)
HBA1C MFR BLD HPLC: 5.3 % (ref 4–5.6)
HCT VFR BLD AUTO: 35.8 % (ref 37–48.5)
HGB BLD-MCNC: 11.6 G/DL (ref 12–16)
IMM GRANULOCYTES # BLD AUTO: 0.02 K/UL (ref 0–0.04)
IMM GRANULOCYTES NFR BLD AUTO: 0.3 % (ref 0–0.5)
LYMPHOCYTES # BLD AUTO: 2.9 K/UL (ref 1–4.8)
LYMPHOCYTES NFR BLD: 41.4 % (ref 18–48)
MCH RBC QN AUTO: 28.6 PG (ref 27–31)
MCHC RBC AUTO-ENTMCNC: 32.4 G/DL (ref 32–36)
MCV RBC AUTO: 88 FL (ref 82–98)
MONOCYTES # BLD AUTO: 0.5 K/UL (ref 0.3–1)
MONOCYTES NFR BLD: 6.9 % (ref 4–15)
NEUTROPHILS # BLD AUTO: 3.6 K/UL (ref 1.8–7.7)
NEUTROPHILS NFR BLD: 50.3 % (ref 38–73)
NRBC BLD-RTO: 0 /100 WBC
PLATELET # BLD AUTO: 266 K/UL (ref 150–350)
PMV BLD AUTO: 10 FL (ref 9.2–12.9)
POTASSIUM SERPL-SCNC: 3.6 MMOL/L (ref 3.5–5.1)
PROT SERPL-MCNC: 7.8 G/DL (ref 6–8.4)
PROT UR-MCNC: <7 MG/DL
PROT/CREAT UR: NORMAL MG/G{CREAT} (ref 0–0.2)
RBC # BLD AUTO: 4.06 M/UL (ref 4–5.4)
SODIUM SERPL-SCNC: 137 MMOL/L (ref 136–145)
WBC # BLD AUTO: 7.06 K/UL (ref 3.9–12.7)

## 2021-01-11 PROCEDURE — 99999 PR PBB SHADOW E&M-EST. PATIENT-LVL II: ICD-10-PCS | Mod: PBBFAC,,, | Performed by: OBSTETRICS & GYNECOLOGY

## 2021-01-11 PROCEDURE — 87086 URINE CULTURE/COLONY COUNT: CPT

## 2021-01-11 PROCEDURE — 86900 BLOOD TYPING SEROLOGIC ABO: CPT

## 2021-01-11 PROCEDURE — 85025 COMPLETE CBC W/AUTO DIFF WBC: CPT

## 2021-01-11 PROCEDURE — 99204 OFFICE O/P NEW MOD 45 MIN: CPT | Mod: S$PBB,TH,, | Performed by: OBSTETRICS & GYNECOLOGY

## 2021-01-11 PROCEDURE — 86762 RUBELLA ANTIBODY: CPT

## 2021-01-11 PROCEDURE — 83021 HEMOGLOBIN CHROMOTOGRAPHY: CPT

## 2021-01-11 PROCEDURE — 86592 SYPHILIS TEST NON-TREP QUAL: CPT

## 2021-01-11 PROCEDURE — 99999 PR PBB SHADOW E&M-EST. PATIENT-LVL II: CPT | Mod: PBBFAC,,, | Performed by: OBSTETRICS & GYNECOLOGY

## 2021-01-11 PROCEDURE — 80053 COMPREHEN METABOLIC PANEL: CPT

## 2021-01-11 PROCEDURE — 99212 OFFICE O/P EST SF 10 MIN: CPT | Mod: PBBFAC,25 | Performed by: OBSTETRICS & GYNECOLOGY

## 2021-01-11 PROCEDURE — 87340 HEPATITIS B SURFACE AG IA: CPT

## 2021-01-11 PROCEDURE — 86803 HEPATITIS C AB TEST: CPT

## 2021-01-11 PROCEDURE — 99204 PR OFFICE/OUTPT VISIT, NEW, LEVL IV, 45-59 MIN: ICD-10-PCS | Mod: S$PBB,TH,, | Performed by: OBSTETRICS & GYNECOLOGY

## 2021-01-11 PROCEDURE — 83036 HEMOGLOBIN GLYCOSYLATED A1C: CPT

## 2021-01-11 PROCEDURE — 84156 ASSAY OF PROTEIN URINE: CPT

## 2021-01-11 PROCEDURE — 36415 COLL VENOUS BLD VENIPUNCTURE: CPT

## 2021-01-11 PROCEDURE — 86703 HIV-1/HIV-2 1 RESULT ANTBDY: CPT

## 2021-01-12 LAB
HBV SURFACE AG SERPL QL IA: NEGATIVE
HCV AB SERPL QL IA: NEGATIVE
HIV 1+2 AB+HIV1 P24 AG SERPL QL IA: NEGATIVE
RPR SER QL: NORMAL
RUBV IGG SER-ACNC: 28.5 IU/ML
RUBV IGG SER-IMP: REACTIVE

## 2021-01-13 LAB
BACTERIA UR CULT: NORMAL
HGB A MFR BLD ELPH: 98 % (ref 95.8–98)
HGB A2 MFR BLD: 2 % (ref 2–3.3)
HGB F MFR BLD: 0 % (ref 0–0.9)
HGB FRACT BLD ELPH-IMP: NORMAL
HGB OTHER MFR BLD ELPH: 0 %
THEVP VARIANT 2: NORMAL
THEVP VARIANT 3: NORMAL

## 2021-01-20 ENCOUNTER — PATIENT MESSAGE (OUTPATIENT)
Dept: ADMINISTRATIVE | Facility: OTHER | Age: 21
End: 2021-01-20

## 2021-01-27 ENCOUNTER — PATIENT MESSAGE (OUTPATIENT)
Dept: ADMINISTRATIVE | Facility: OTHER | Age: 21
End: 2021-01-27

## 2021-02-08 ENCOUNTER — PATIENT MESSAGE (OUTPATIENT)
Dept: ADMINISTRATIVE | Facility: OTHER | Age: 21
End: 2021-02-08

## 2021-02-08 ENCOUNTER — ROUTINE PRENATAL (OUTPATIENT)
Dept: OBSTETRICS AND GYNECOLOGY | Facility: CLINIC | Age: 21
End: 2021-02-08
Payer: MEDICAID

## 2021-02-08 VITALS — WEIGHT: 169.56 LBS | DIASTOLIC BLOOD PRESSURE: 66 MMHG | BODY MASS INDEX: 29.1 KG/M2 | SYSTOLIC BLOOD PRESSURE: 120 MMHG

## 2021-02-08 DIAGNOSIS — Z87.59 HISTORY OF PREGNANCY INDUCED HYPERTENSION: ICD-10-CM

## 2021-02-08 DIAGNOSIS — Z34.92 SECOND TRIMESTER PREGNANCY: ICD-10-CM

## 2021-02-08 DIAGNOSIS — Z3A.14 14 WEEKS GESTATION OF PREGNANCY: Primary | ICD-10-CM

## 2021-02-08 PROCEDURE — 99213 PR OFFICE/OUTPT VISIT, EST, LEVL III, 20-29 MIN: ICD-10-PCS | Mod: TH,S$PBB,, | Performed by: OBSTETRICS & GYNECOLOGY

## 2021-02-08 PROCEDURE — 99999 PR PBB SHADOW E&M-EST. PATIENT-LVL II: CPT | Mod: PBBFAC,,, | Performed by: OBSTETRICS & GYNECOLOGY

## 2021-02-08 PROCEDURE — 99999 PR PBB SHADOW E&M-EST. PATIENT-LVL II: ICD-10-PCS | Mod: PBBFAC,,, | Performed by: OBSTETRICS & GYNECOLOGY

## 2021-02-08 PROCEDURE — 99212 OFFICE O/P EST SF 10 MIN: CPT | Mod: PBBFAC | Performed by: OBSTETRICS & GYNECOLOGY

## 2021-02-08 PROCEDURE — 99213 OFFICE O/P EST LOW 20 MIN: CPT | Mod: TH,S$PBB,, | Performed by: OBSTETRICS & GYNECOLOGY

## 2021-03-04 ENCOUNTER — NURSE TRIAGE (OUTPATIENT)
Dept: ADMINISTRATIVE | Facility: CLINIC | Age: 21
End: 2021-03-04

## 2021-03-04 ENCOUNTER — HOSPITAL ENCOUNTER (EMERGENCY)
Facility: HOSPITAL | Age: 21
Discharge: HOME | End: 2021-03-04
Attending: EMERGENCY MEDICINE
Payer: MEDICAID

## 2021-03-04 VITALS
HEART RATE: 80 BPM | DIASTOLIC BLOOD PRESSURE: 59 MMHG | SYSTOLIC BLOOD PRESSURE: 109 MMHG | HEIGHT: 64 IN | RESPIRATION RATE: 18 BRPM | WEIGHT: 168 LBS | BODY MASS INDEX: 28.68 KG/M2 | OXYGEN SATURATION: 99 % | TEMPERATURE: 99 F

## 2021-03-04 DIAGNOSIS — M54.50 ACUTE BILATERAL LOW BACK PAIN WITHOUT SCIATICA: Primary | ICD-10-CM

## 2021-03-04 LAB
BILIRUB UR QL STRIP: NEGATIVE
CLARITY UR: CLEAR
COLOR UR: YELLOW
GLUCOSE UR QL STRIP: NEGATIVE
HGB UR QL STRIP: NEGATIVE
KETONES UR QL STRIP: NEGATIVE
LEUKOCYTE ESTERASE UR QL STRIP: NEGATIVE
NITRITE UR QL STRIP: NEGATIVE
PH UR STRIP: 7 [PH] (ref 5–8)
PROT UR QL STRIP: NEGATIVE
SP GR UR STRIP: 1.01 (ref 1–1.03)
URN SPEC COLLECT METH UR: NORMAL
UROBILINOGEN UR STRIP-ACNC: NEGATIVE EU/DL

## 2021-03-04 PROCEDURE — 81003 URINALYSIS AUTO W/O SCOPE: CPT | Performed by: NURSE PRACTITIONER

## 2021-03-04 PROCEDURE — 25000003 PHARM REV CODE 250: Performed by: NURSE PRACTITIONER

## 2021-03-04 PROCEDURE — 99283 EMERGENCY DEPT VISIT LOW MDM: CPT

## 2021-03-04 RX ORDER — ACETAMINOPHEN 500 MG
1000 TABLET ORAL
Status: COMPLETED | OUTPATIENT
Start: 2021-03-04 | End: 2021-03-04

## 2021-03-04 RX ADMIN — ACETAMINOPHEN 1000 MG: 500 TABLET ORAL at 03:03

## 2021-03-08 ENCOUNTER — ROUTINE PRENATAL (OUTPATIENT)
Dept: OBSTETRICS AND GYNECOLOGY | Facility: CLINIC | Age: 21
End: 2021-03-08
Payer: MEDICAID

## 2021-03-08 ENCOUNTER — LAB VISIT (OUTPATIENT)
Dept: LAB | Facility: HOSPITAL | Age: 21
End: 2021-03-08
Attending: OBSTETRICS & GYNECOLOGY
Payer: MEDICAID

## 2021-03-08 VITALS
DIASTOLIC BLOOD PRESSURE: 62 MMHG | SYSTOLIC BLOOD PRESSURE: 102 MMHG | BODY MASS INDEX: 28.95 KG/M2 | WEIGHT: 168.63 LBS

## 2021-03-08 DIAGNOSIS — Z87.59 HISTORY OF PREGNANCY INDUCED HYPERTENSION: ICD-10-CM

## 2021-03-08 DIAGNOSIS — Z34.92 SECOND TRIMESTER PREGNANCY: ICD-10-CM

## 2021-03-08 DIAGNOSIS — Z3A.18 18 WEEKS GESTATION OF PREGNANCY: Primary | ICD-10-CM

## 2021-03-08 DIAGNOSIS — Z3A.18 18 WEEKS GESTATION OF PREGNANCY: ICD-10-CM

## 2021-03-08 PROCEDURE — 36415 COLL VENOUS BLD VENIPUNCTURE: CPT | Performed by: OBSTETRICS & GYNECOLOGY

## 2021-03-08 PROCEDURE — 99212 OFFICE O/P EST SF 10 MIN: CPT | Mod: PBBFAC | Performed by: OBSTETRICS & GYNECOLOGY

## 2021-03-08 PROCEDURE — 99213 PR OFFICE/OUTPT VISIT, EST, LEVL III, 20-29 MIN: ICD-10-PCS | Mod: TH,S$PBB,, | Performed by: OBSTETRICS & GYNECOLOGY

## 2021-03-08 PROCEDURE — 99999 PR PBB SHADOW E&M-EST. PATIENT-LVL II: ICD-10-PCS | Mod: PBBFAC,,, | Performed by: OBSTETRICS & GYNECOLOGY

## 2021-03-08 PROCEDURE — 99999 PR PBB SHADOW E&M-EST. PATIENT-LVL II: CPT | Mod: PBBFAC,,, | Performed by: OBSTETRICS & GYNECOLOGY

## 2021-03-08 PROCEDURE — 99213 OFFICE O/P EST LOW 20 MIN: CPT | Mod: TH,S$PBB,, | Performed by: OBSTETRICS & GYNECOLOGY

## 2021-03-08 PROCEDURE — 81511 FTL CGEN ABNOR FOUR ANAL: CPT | Performed by: OBSTETRICS & GYNECOLOGY

## 2021-03-12 LAB
# FETUSES US: NORMAL
2ND TRIMESTER 4 SCREEN PNL SERPL: NEGATIVE
2ND TRIMESTER 4 SCREEN SERPL-IMP: NORMAL
AFP MOM SERPL: 0.67
AFP SERPL-MCNC: 34.9 NG/ML
AGE AT DELIVERY: 22
B-HCG MOM SERPL: 0.95
B-HCG SERPL-ACNC: 23.8 IU/ML
FET TS 21 RISK FROM MAT AGE: NORMAL
GA (DAYS): 5 D
GA (WEEKS): 18 WK
GA METHOD: NORMAL
IDDM PATIENT QL: NORMAL
INHIBIN A MOM SERPL: 0.89
INHIBIN A SERPL-MCNC: 121.3 PG/ML
SMOKING STATUS FTND: NO
TS 18 RISK FETUS: NORMAL
TS 21 RISK FETUS: NORMAL
U ESTRIOL MOM SERPL: 1.33
U ESTRIOL SERPL-MCNC: 2.39 NG/ML

## 2021-03-15 DIAGNOSIS — Z36.89 ENCOUNTER FOR FETAL ANATOMIC SURVEY: Primary | ICD-10-CM

## 2021-03-22 ENCOUNTER — PATIENT MESSAGE (OUTPATIENT)
Dept: MATERNAL FETAL MEDICINE | Facility: CLINIC | Age: 21
End: 2021-03-22

## 2021-03-23 ENCOUNTER — PROCEDURE VISIT (OUTPATIENT)
Dept: MATERNAL FETAL MEDICINE | Facility: CLINIC | Age: 21
End: 2021-03-23
Payer: MEDICAID

## 2021-03-23 DIAGNOSIS — Z36.89 ENCOUNTER FOR FETAL ANATOMIC SURVEY: ICD-10-CM

## 2021-03-23 PROCEDURE — 76805 OB US >/= 14 WKS SNGL FETUS: CPT | Mod: PBBFAC,PO | Performed by: OBSTETRICS & GYNECOLOGY

## 2021-03-23 PROCEDURE — 76805 OB US >/= 14 WKS SNGL FETUS: CPT | Mod: 26,S$PBB,, | Performed by: OBSTETRICS & GYNECOLOGY

## 2021-03-23 PROCEDURE — 76805 PR US, OB 14+WKS, TRANSABD, SINGLE GESTATION: ICD-10-PCS | Mod: 26,S$PBB,, | Performed by: OBSTETRICS & GYNECOLOGY

## 2021-04-05 ENCOUNTER — ROUTINE PRENATAL (OUTPATIENT)
Dept: OBSTETRICS AND GYNECOLOGY | Facility: CLINIC | Age: 21
End: 2021-04-05
Payer: MEDICAID

## 2021-04-05 VITALS — BODY MASS INDEX: 29.9 KG/M2 | DIASTOLIC BLOOD PRESSURE: 64 MMHG | WEIGHT: 174.19 LBS | SYSTOLIC BLOOD PRESSURE: 122 MMHG

## 2021-04-05 DIAGNOSIS — Z3A.22 22 WEEKS GESTATION OF PREGNANCY: Primary | ICD-10-CM

## 2021-04-05 DIAGNOSIS — Z34.92 SECOND TRIMESTER PREGNANCY: ICD-10-CM

## 2021-04-05 DIAGNOSIS — Z87.59 HISTORY OF PREGNANCY INDUCED HYPERTENSION: ICD-10-CM

## 2021-04-05 PROCEDURE — 99999 PR PBB SHADOW E&M-EST. PATIENT-LVL II: CPT | Mod: PBBFAC,,, | Performed by: OBSTETRICS & GYNECOLOGY

## 2021-04-05 PROCEDURE — 99212 OFFICE O/P EST SF 10 MIN: CPT | Mod: PBBFAC | Performed by: OBSTETRICS & GYNECOLOGY

## 2021-04-05 PROCEDURE — 99213 PR OFFICE/OUTPT VISIT, EST, LEVL III, 20-29 MIN: ICD-10-PCS | Mod: TH,S$PBB,, | Performed by: OBSTETRICS & GYNECOLOGY

## 2021-04-05 PROCEDURE — 99213 OFFICE O/P EST LOW 20 MIN: CPT | Mod: TH,S$PBB,, | Performed by: OBSTETRICS & GYNECOLOGY

## 2021-04-05 PROCEDURE — 99999 PR PBB SHADOW E&M-EST. PATIENT-LVL II: ICD-10-PCS | Mod: PBBFAC,,, | Performed by: OBSTETRICS & GYNECOLOGY

## 2021-04-16 ENCOUNTER — PATIENT MESSAGE (OUTPATIENT)
Dept: RESEARCH | Facility: HOSPITAL | Age: 21
End: 2021-04-16

## 2021-04-21 ENCOUNTER — PATIENT MESSAGE (OUTPATIENT)
Dept: ADMINISTRATIVE | Facility: OTHER | Age: 21
End: 2021-04-21

## 2021-04-21 ENCOUNTER — PATIENT MESSAGE (OUTPATIENT)
Dept: OBSTETRICS AND GYNECOLOGY | Facility: CLINIC | Age: 21
End: 2021-04-21

## 2021-04-27 ENCOUNTER — PROCEDURE VISIT (OUTPATIENT)
Dept: MATERNAL FETAL MEDICINE | Facility: CLINIC | Age: 21
End: 2021-04-27
Payer: MEDICAID

## 2021-04-27 PROCEDURE — 76816 OB US FOLLOW-UP PER FETUS: CPT | Mod: PBBFAC,PO | Performed by: OBSTETRICS & GYNECOLOGY

## 2021-04-27 PROCEDURE — 76816 PR  US,PREGNANT UTERUS,F/U,TRANSABD APP: ICD-10-PCS | Mod: 26,S$PBB,, | Performed by: OBSTETRICS & GYNECOLOGY

## 2021-04-27 PROCEDURE — 76816 OB US FOLLOW-UP PER FETUS: CPT | Mod: 26,S$PBB,, | Performed by: OBSTETRICS & GYNECOLOGY

## 2021-04-28 ENCOUNTER — HOSPITAL ENCOUNTER (OUTPATIENT)
Facility: HOSPITAL | Age: 21
Discharge: HOME | End: 2021-04-29
Attending: OBSTETRICS & GYNECOLOGY | Admitting: OBSTETRICS & GYNECOLOGY
Payer: MEDICAID

## 2021-04-28 DIAGNOSIS — R10.9 ABDOMINAL PAIN: ICD-10-CM

## 2021-04-29 VITALS
TEMPERATURE: 99 F | OXYGEN SATURATION: 98 % | HEART RATE: 81 BPM | DIASTOLIC BLOOD PRESSURE: 58 MMHG | SYSTOLIC BLOOD PRESSURE: 100 MMHG | RESPIRATION RATE: 18 BRPM

## 2021-04-29 PROBLEM — R10.9 ABDOMINAL PAIN: Status: ACTIVE | Noted: 2021-04-29

## 2021-04-29 LAB
BILIRUB UR QL STRIP: NEGATIVE
CLARITY UR: CLEAR
COLOR UR: YELLOW
GLUCOSE UR QL STRIP: NEGATIVE
HGB UR QL STRIP: NEGATIVE
KETONES UR QL STRIP: NEGATIVE
LEUKOCYTE ESTERASE UR QL STRIP: NEGATIVE
NITRITE UR QL STRIP: NEGATIVE
PH UR STRIP: 6 [PH] (ref 5–8)
PROT UR QL STRIP: NEGATIVE
SP GR UR STRIP: 1.01 (ref 1–1.03)
URN SPEC COLLECT METH UR: NORMAL
UROBILINOGEN UR STRIP-ACNC: NEGATIVE EU/DL

## 2021-04-29 PROCEDURE — 81003 URINALYSIS AUTO W/O SCOPE: CPT | Performed by: OBSTETRICS & GYNECOLOGY

## 2021-04-29 PROCEDURE — 99211 OFF/OP EST MAY X REQ PHY/QHP: CPT | Mod: 25

## 2021-04-29 PROCEDURE — 59025 FETAL NON-STRESS TEST: CPT

## 2021-05-03 ENCOUNTER — ROUTINE PRENATAL (OUTPATIENT)
Dept: OBSTETRICS AND GYNECOLOGY | Facility: CLINIC | Age: 21
End: 2021-05-03
Payer: MEDICAID

## 2021-05-03 ENCOUNTER — LAB VISIT (OUTPATIENT)
Dept: LAB | Facility: HOSPITAL | Age: 21
End: 2021-05-03
Attending: OBSTETRICS & GYNECOLOGY
Payer: MEDICAID

## 2021-05-03 VITALS
BODY MASS INDEX: 30.77 KG/M2 | SYSTOLIC BLOOD PRESSURE: 120 MMHG | WEIGHT: 179.25 LBS | DIASTOLIC BLOOD PRESSURE: 54 MMHG

## 2021-05-03 DIAGNOSIS — Z87.59 HISTORY OF PREGNANCY INDUCED HYPERTENSION: ICD-10-CM

## 2021-05-03 DIAGNOSIS — Z3A.26 26 WEEKS GESTATION OF PREGNANCY: Primary | ICD-10-CM

## 2021-05-03 DIAGNOSIS — Z3A.26 26 WEEKS GESTATION OF PREGNANCY: ICD-10-CM

## 2021-05-03 LAB
BASOPHILS # BLD AUTO: 0.03 K/UL (ref 0–0.2)
BASOPHILS NFR BLD: 0.2 % (ref 0–1.9)
DIFFERENTIAL METHOD: ABNORMAL
EOSINOPHIL # BLD AUTO: 0.1 K/UL (ref 0–0.5)
EOSINOPHIL NFR BLD: 0.7 % (ref 0–8)
ERYTHROCYTE [DISTWIDTH] IN BLOOD BY AUTOMATED COUNT: 12.9 % (ref 11.5–14.5)
GLUCOSE SERPL-MCNC: 106 MG/DL (ref 70–140)
HCT VFR BLD AUTO: 32.6 % (ref 37–48.5)
HGB BLD-MCNC: 10.3 G/DL (ref 12–16)
IMM GRANULOCYTES # BLD AUTO: 0.11 K/UL (ref 0–0.04)
IMM GRANULOCYTES NFR BLD AUTO: 0.8 % (ref 0–0.5)
LYMPHOCYTES # BLD AUTO: 2.5 K/UL (ref 1–4.8)
LYMPHOCYTES NFR BLD: 18 % (ref 18–48)
MCH RBC QN AUTO: 28.6 PG (ref 27–31)
MCHC RBC AUTO-ENTMCNC: 31.6 G/DL (ref 32–36)
MCV RBC AUTO: 91 FL (ref 82–98)
MONOCYTES # BLD AUTO: 0.8 K/UL (ref 0.3–1)
MONOCYTES NFR BLD: 6 % (ref 4–15)
NEUTROPHILS # BLD AUTO: 10.2 K/UL (ref 1.8–7.7)
NEUTROPHILS NFR BLD: 74.3 % (ref 38–73)
NRBC BLD-RTO: 0 /100 WBC
PLATELET # BLD AUTO: 208 K/UL (ref 150–450)
PMV BLD AUTO: 10.8 FL (ref 9.2–12.9)
RBC # BLD AUTO: 3.6 M/UL (ref 4–5.4)
WBC # BLD AUTO: 13.78 K/UL (ref 3.9–12.7)

## 2021-05-03 PROCEDURE — 99212 OFFICE O/P EST SF 10 MIN: CPT | Mod: PBBFAC | Performed by: OBSTETRICS & GYNECOLOGY

## 2021-05-03 PROCEDURE — 82950 GLUCOSE TEST: CPT | Performed by: OBSTETRICS & GYNECOLOGY

## 2021-05-03 PROCEDURE — 99999 PR PBB SHADOW E&M-EST. PATIENT-LVL II: CPT | Mod: PBBFAC,,, | Performed by: OBSTETRICS & GYNECOLOGY

## 2021-05-03 PROCEDURE — 99213 OFFICE O/P EST LOW 20 MIN: CPT | Mod: TH,S$PBB,, | Performed by: OBSTETRICS & GYNECOLOGY

## 2021-05-03 PROCEDURE — 36415 COLL VENOUS BLD VENIPUNCTURE: CPT | Performed by: OBSTETRICS & GYNECOLOGY

## 2021-05-03 PROCEDURE — 99999 PR PBB SHADOW E&M-EST. PATIENT-LVL II: ICD-10-PCS | Mod: PBBFAC,,, | Performed by: OBSTETRICS & GYNECOLOGY

## 2021-05-03 PROCEDURE — 85025 COMPLETE CBC W/AUTO DIFF WBC: CPT | Performed by: OBSTETRICS & GYNECOLOGY

## 2021-05-03 PROCEDURE — 99213 PR OFFICE/OUTPT VISIT, EST, LEVL III, 20-29 MIN: ICD-10-PCS | Mod: TH,S$PBB,, | Performed by: OBSTETRICS & GYNECOLOGY

## 2021-05-12 ENCOUNTER — PATIENT MESSAGE (OUTPATIENT)
Dept: ADMINISTRATIVE | Facility: OTHER | Age: 21
End: 2021-05-12

## 2021-05-17 ENCOUNTER — ROUTINE PRENATAL (OUTPATIENT)
Dept: OBSTETRICS AND GYNECOLOGY | Facility: CLINIC | Age: 21
End: 2021-05-17
Payer: MEDICAID

## 2021-05-17 VITALS — WEIGHT: 183 LBS | SYSTOLIC BLOOD PRESSURE: 118 MMHG | BODY MASS INDEX: 31.41 KG/M2 | DIASTOLIC BLOOD PRESSURE: 60 MMHG

## 2021-05-17 DIAGNOSIS — Z3A.28 28 WEEKS GESTATION OF PREGNANCY: Primary | ICD-10-CM

## 2021-05-17 DIAGNOSIS — Z34.93 THIRD TRIMESTER PREGNANCY: ICD-10-CM

## 2021-05-17 DIAGNOSIS — Z87.59 HISTORY OF PREGNANCY INDUCED HYPERTENSION: ICD-10-CM

## 2021-05-17 PROCEDURE — 99213 OFFICE O/P EST LOW 20 MIN: CPT | Mod: TH,S$PBB,, | Performed by: OBSTETRICS & GYNECOLOGY

## 2021-05-17 PROCEDURE — 99213 PR OFFICE/OUTPT VISIT, EST, LEVL III, 20-29 MIN: ICD-10-PCS | Mod: TH,S$PBB,, | Performed by: OBSTETRICS & GYNECOLOGY

## 2021-05-17 PROCEDURE — 99999 PR PBB SHADOW E&M-EST. PATIENT-LVL II: ICD-10-PCS | Mod: PBBFAC,,, | Performed by: OBSTETRICS & GYNECOLOGY

## 2021-05-17 PROCEDURE — 99212 OFFICE O/P EST SF 10 MIN: CPT | Mod: PBBFAC | Performed by: OBSTETRICS & GYNECOLOGY

## 2021-05-17 PROCEDURE — 99999 PR PBB SHADOW E&M-EST. PATIENT-LVL II: CPT | Mod: PBBFAC,,, | Performed by: OBSTETRICS & GYNECOLOGY

## 2021-05-31 ENCOUNTER — ROUTINE PRENATAL (OUTPATIENT)
Dept: OBSTETRICS AND GYNECOLOGY | Facility: CLINIC | Age: 21
End: 2021-05-31
Payer: MEDICAID

## 2021-05-31 VITALS
WEIGHT: 183.88 LBS | SYSTOLIC BLOOD PRESSURE: 126 MMHG | BODY MASS INDEX: 31.56 KG/M2 | DIASTOLIC BLOOD PRESSURE: 70 MMHG

## 2021-05-31 DIAGNOSIS — Z34.93 THIRD TRIMESTER PREGNANCY: ICD-10-CM

## 2021-05-31 DIAGNOSIS — Z87.59 HISTORY OF PREGNANCY INDUCED HYPERTENSION: ICD-10-CM

## 2021-05-31 DIAGNOSIS — Z3A.30 30 WEEKS GESTATION OF PREGNANCY: Primary | ICD-10-CM

## 2021-05-31 PROCEDURE — 99213 PR OFFICE/OUTPT VISIT, EST, LEVL III, 20-29 MIN: ICD-10-PCS | Mod: TH,S$PBB,, | Performed by: OBSTETRICS & GYNECOLOGY

## 2021-05-31 PROCEDURE — 99212 OFFICE O/P EST SF 10 MIN: CPT | Mod: PBBFAC | Performed by: OBSTETRICS & GYNECOLOGY

## 2021-05-31 PROCEDURE — 99999 PR PBB SHADOW E&M-EST. PATIENT-LVL II: CPT | Mod: PBBFAC,,, | Performed by: OBSTETRICS & GYNECOLOGY

## 2021-05-31 PROCEDURE — 99213 OFFICE O/P EST LOW 20 MIN: CPT | Mod: TH,S$PBB,, | Performed by: OBSTETRICS & GYNECOLOGY

## 2021-05-31 PROCEDURE — 99999 PR PBB SHADOW E&M-EST. PATIENT-LVL II: ICD-10-PCS | Mod: PBBFAC,,, | Performed by: OBSTETRICS & GYNECOLOGY

## 2021-06-07 ENCOUNTER — ROUTINE PRENATAL (OUTPATIENT)
Dept: OBSTETRICS AND GYNECOLOGY | Facility: CLINIC | Age: 21
End: 2021-06-07
Payer: MEDICAID

## 2021-06-07 VITALS
SYSTOLIC BLOOD PRESSURE: 130 MMHG | BODY MASS INDEX: 31.45 KG/M2 | WEIGHT: 183.19 LBS | DIASTOLIC BLOOD PRESSURE: 90 MMHG

## 2021-06-07 DIAGNOSIS — Z34.93 THIRD TRIMESTER PREGNANCY: ICD-10-CM

## 2021-06-07 DIAGNOSIS — B96.89 BACTERIAL VAGINOSIS: ICD-10-CM

## 2021-06-07 DIAGNOSIS — Z3A.31 31 WEEKS GESTATION OF PREGNANCY: Primary | ICD-10-CM

## 2021-06-07 DIAGNOSIS — Z87.59 HISTORY OF PREGNANCY INDUCED HYPERTENSION: ICD-10-CM

## 2021-06-07 DIAGNOSIS — N76.0 BACTERIAL VAGINOSIS: ICD-10-CM

## 2021-06-07 PROCEDURE — 99212 OFFICE O/P EST SF 10 MIN: CPT | Mod: PBBFAC | Performed by: OBSTETRICS & GYNECOLOGY

## 2021-06-07 PROCEDURE — 99999 PR PBB SHADOW E&M-EST. PATIENT-LVL II: ICD-10-PCS | Mod: PBBFAC,,, | Performed by: OBSTETRICS & GYNECOLOGY

## 2021-06-07 PROCEDURE — 87481 CANDIDA DNA AMP PROBE: CPT | Mod: 59 | Performed by: OBSTETRICS & GYNECOLOGY

## 2021-06-07 PROCEDURE — 99213 PR OFFICE/OUTPT VISIT, EST, LEVL III, 20-29 MIN: ICD-10-PCS | Mod: TH,S$PBB,, | Performed by: OBSTETRICS & GYNECOLOGY

## 2021-06-07 PROCEDURE — 99999 PR PBB SHADOW E&M-EST. PATIENT-LVL II: CPT | Mod: PBBFAC,,, | Performed by: OBSTETRICS & GYNECOLOGY

## 2021-06-07 PROCEDURE — 99213 OFFICE O/P EST LOW 20 MIN: CPT | Mod: TH,S$PBB,, | Performed by: OBSTETRICS & GYNECOLOGY

## 2021-06-07 RX ORDER — METRONIDAZOLE 500 MG/1
500 TABLET ORAL EVERY 12 HOURS
Qty: 14 TABLET | Refills: 0 | Status: ON HOLD | OUTPATIENT
Start: 2021-06-07 | End: 2021-06-26 | Stop reason: HOSPADM

## 2021-06-09 ENCOUNTER — PATIENT MESSAGE (OUTPATIENT)
Dept: ADMINISTRATIVE | Facility: OTHER | Age: 21
End: 2021-06-09

## 2021-06-11 ENCOUNTER — PATIENT MESSAGE (OUTPATIENT)
Dept: OBSTETRICS AND GYNECOLOGY | Facility: CLINIC | Age: 21
End: 2021-06-11

## 2021-06-11 LAB
BACTERIAL VAGINOSIS DNA: POSITIVE
CANDIDA GLABRATA DNA: NEGATIVE
CANDIDA KRUSEI DNA: NEGATIVE
CANDIDA RRNA VAG QL PROBE: NEGATIVE
T VAGINALIS RRNA GENITAL QL PROBE: NEGATIVE

## 2021-06-14 ENCOUNTER — ROUTINE PRENATAL (OUTPATIENT)
Dept: OBSTETRICS AND GYNECOLOGY | Facility: CLINIC | Age: 21
End: 2021-06-14
Payer: MEDICAID

## 2021-06-14 VITALS — DIASTOLIC BLOOD PRESSURE: 82 MMHG | SYSTOLIC BLOOD PRESSURE: 130 MMHG | BODY MASS INDEX: 32.35 KG/M2 | WEIGHT: 188.5 LBS

## 2021-06-14 DIAGNOSIS — Z34.93 THIRD TRIMESTER PREGNANCY: ICD-10-CM

## 2021-06-14 DIAGNOSIS — Z87.59 HISTORY OF PREGNANCY INDUCED HYPERTENSION: ICD-10-CM

## 2021-06-14 DIAGNOSIS — Z3A.32 32 WEEKS GESTATION OF PREGNANCY: Primary | ICD-10-CM

## 2021-06-14 PROCEDURE — 99999 PR PBB SHADOW E&M-EST. PATIENT-LVL III: CPT | Mod: PBBFAC,,, | Performed by: OBSTETRICS & GYNECOLOGY

## 2021-06-14 PROCEDURE — 99213 OFFICE O/P EST LOW 20 MIN: CPT | Mod: PBBFAC | Performed by: OBSTETRICS & GYNECOLOGY

## 2021-06-14 PROCEDURE — 99213 PR OFFICE/OUTPT VISIT, EST, LEVL III, 20-29 MIN: ICD-10-PCS | Mod: S$PBB,TH,, | Performed by: OBSTETRICS & GYNECOLOGY

## 2021-06-14 PROCEDURE — 99213 OFFICE O/P EST LOW 20 MIN: CPT | Mod: S$PBB,TH,, | Performed by: OBSTETRICS & GYNECOLOGY

## 2021-06-14 PROCEDURE — 99999 PR PBB SHADOW E&M-EST. PATIENT-LVL III: ICD-10-PCS | Mod: PBBFAC,,, | Performed by: OBSTETRICS & GYNECOLOGY

## 2021-06-21 ENCOUNTER — HOSPITAL ENCOUNTER (INPATIENT)
Facility: HOSPITAL | Age: 21
LOS: 5 days | Discharge: HOME | End: 2021-06-26
Attending: OBSTETRICS & GYNECOLOGY | Admitting: OBSTETRICS & GYNECOLOGY
Payer: MEDICAID

## 2021-06-21 DIAGNOSIS — Z87.59 HISTORY OF PREGNANCY INDUCED HYPERTENSION: ICD-10-CM

## 2021-06-21 DIAGNOSIS — Z3A.34 34 WEEKS GESTATION OF PREGNANCY: ICD-10-CM

## 2021-06-21 DIAGNOSIS — Z3A.33 33 WEEKS GESTATION OF PREGNANCY: ICD-10-CM

## 2021-06-21 DIAGNOSIS — O16.3 HYPERTENSION AFFECTING PREGNANCY IN THIRD TRIMESTER: ICD-10-CM

## 2021-06-21 LAB
ABO + RH BLD: NORMAL
ALBUMIN SERPL BCP-MCNC: 2.6 G/DL (ref 3.5–5.2)
ALP SERPL-CCNC: 101 U/L (ref 55–135)
ALT SERPL W/O P-5'-P-CCNC: 10 U/L (ref 10–44)
ANION GAP SERPL CALC-SCNC: 7 MMOL/L (ref 8–16)
AST SERPL-CCNC: 13 U/L (ref 10–40)
BASOPHILS # BLD AUTO: 0.02 K/UL (ref 0–0.2)
BASOPHILS NFR BLD: 0.2 % (ref 0–1.9)
BILIRUB SERPL-MCNC: 0.3 MG/DL (ref 0.1–1)
BLD GP AB SCN CELLS X3 SERPL QL: NORMAL
BUN SERPL-MCNC: 6 MG/DL (ref 6–20)
CALCIUM SERPL-MCNC: 8.8 MG/DL (ref 8.7–10.5)
CHLORIDE SERPL-SCNC: 110 MMOL/L (ref 95–110)
CO2 SERPL-SCNC: 20 MMOL/L (ref 23–29)
CREAT SERPL-MCNC: 0.6 MG/DL (ref 0.5–1.4)
CREAT UR-MCNC: 8.6 MG/DL (ref 15–325)
DIFFERENTIAL METHOD: ABNORMAL
EOSINOPHIL # BLD AUTO: 0.1 K/UL (ref 0–0.5)
EOSINOPHIL NFR BLD: 1 % (ref 0–8)
ERYTHROCYTE [DISTWIDTH] IN BLOOD BY AUTOMATED COUNT: 13.6 % (ref 11.5–14.5)
EST. GFR  (AFRICAN AMERICAN): >60 ML/MIN/1.73 M^2
EST. GFR  (NON AFRICAN AMERICAN): >60 ML/MIN/1.73 M^2
GLUCOSE SERPL-MCNC: 80 MG/DL (ref 70–110)
HCT VFR BLD AUTO: 31.2 % (ref 37–48.5)
HGB BLD-MCNC: 10 G/DL (ref 12–16)
IMM GRANULOCYTES # BLD AUTO: 0.05 K/UL (ref 0–0.04)
IMM GRANULOCYTES NFR BLD AUTO: 0.4 % (ref 0–0.5)
LDH SERPL L TO P-CCNC: 188 U/L (ref 110–260)
LYMPHOCYTES # BLD AUTO: 2.9 K/UL (ref 1–4.8)
LYMPHOCYTES NFR BLD: 23.8 % (ref 18–48)
MCH RBC QN AUTO: 27.4 PG (ref 27–31)
MCHC RBC AUTO-ENTMCNC: 32.1 G/DL (ref 32–36)
MCV RBC AUTO: 86 FL (ref 82–98)
MONOCYTES # BLD AUTO: 0.9 K/UL (ref 0.3–1)
MONOCYTES NFR BLD: 7.3 % (ref 4–15)
NEUTROPHILS # BLD AUTO: 8.1 K/UL (ref 1.8–7.7)
NEUTROPHILS NFR BLD: 67.3 % (ref 38–73)
NRBC BLD-RTO: 0 /100 WBC
PLATELET # BLD AUTO: 222 K/UL (ref 150–450)
PMV BLD AUTO: 11.7 FL (ref 9.2–12.9)
POTASSIUM SERPL-SCNC: 3.8 MMOL/L (ref 3.5–5.1)
PROT SERPL-MCNC: 6.8 G/DL (ref 6–8.4)
PROT UR-MCNC: <7 MG/DL
PROT/CREAT UR: ABNORMAL MG/G{CREAT} (ref 0–0.2)
RBC # BLD AUTO: 3.65 M/UL (ref 4–5.4)
SODIUM SERPL-SCNC: 137 MMOL/L (ref 136–145)
URATE SERPL-MCNC: 6 MG/DL (ref 2.4–5.7)
WBC # BLD AUTO: 12.08 K/UL (ref 3.9–12.7)

## 2021-06-21 PROCEDURE — 96372 THER/PROPH/DIAG INJ SC/IM: CPT

## 2021-06-21 PROCEDURE — 96368 THER/DIAG CONCURRENT INF: CPT

## 2021-06-21 PROCEDURE — 25000003 PHARM REV CODE 250: Performed by: OBSTETRICS & GYNECOLOGY

## 2021-06-21 PROCEDURE — 96366 THER/PROPH/DIAG IV INF ADDON: CPT

## 2021-06-21 PROCEDURE — 99223 PR INITIAL HOSPITAL CARE,LEVL III: ICD-10-PCS | Mod: ,,, | Performed by: OBSTETRICS & GYNECOLOGY

## 2021-06-21 PROCEDURE — 84550 ASSAY OF BLOOD/URIC ACID: CPT | Performed by: OBSTETRICS & GYNECOLOGY

## 2021-06-21 PROCEDURE — 63600175 PHARM REV CODE 636 W HCPCS: Performed by: OBSTETRICS & GYNECOLOGY

## 2021-06-21 PROCEDURE — 84156 ASSAY OF PROTEIN URINE: CPT | Performed by: OBSTETRICS & GYNECOLOGY

## 2021-06-21 PROCEDURE — 86900 BLOOD TYPING SEROLOGIC ABO: CPT | Performed by: OBSTETRICS & GYNECOLOGY

## 2021-06-21 PROCEDURE — 83615 LACTATE (LD) (LDH) ENZYME: CPT | Performed by: OBSTETRICS & GYNECOLOGY

## 2021-06-21 PROCEDURE — 99223 1ST HOSP IP/OBS HIGH 75: CPT | Mod: ,,, | Performed by: OBSTETRICS & GYNECOLOGY

## 2021-06-21 PROCEDURE — 80053 COMPREHEN METABOLIC PANEL: CPT | Performed by: OBSTETRICS & GYNECOLOGY

## 2021-06-21 PROCEDURE — 96360 HYDRATION IV INFUSION INIT: CPT

## 2021-06-21 PROCEDURE — 36415 COLL VENOUS BLD VENIPUNCTURE: CPT | Performed by: OBSTETRICS & GYNECOLOGY

## 2021-06-21 PROCEDURE — 11000001 HC ACUTE MED/SURG PRIVATE ROOM

## 2021-06-21 PROCEDURE — 59025 FETAL NON-STRESS TEST: CPT

## 2021-06-21 PROCEDURE — 85025 COMPLETE CBC W/AUTO DIFF WBC: CPT | Performed by: OBSTETRICS & GYNECOLOGY

## 2021-06-21 PROCEDURE — 96374 THER/PROPH/DIAG INJ IV PUSH: CPT

## 2021-06-21 PROCEDURE — 99211 OFF/OP EST MAY X REQ PHY/QHP: CPT | Mod: 25

## 2021-06-21 RX ORDER — MAGNESIUM SULFATE HEPTAHYDRATE 40 MG/ML
4 INJECTION, SOLUTION INTRAVENOUS ONCE
Status: COMPLETED | OUTPATIENT
Start: 2021-06-21 | End: 2021-06-21

## 2021-06-21 RX ORDER — SODIUM CHLORIDE, SODIUM LACTATE, POTASSIUM CHLORIDE, CALCIUM CHLORIDE 600; 310; 30; 20 MG/100ML; MG/100ML; MG/100ML; MG/100ML
INJECTION, SOLUTION INTRAVENOUS CONTINUOUS
Status: DISCONTINUED | OUTPATIENT
Start: 2021-06-21 | End: 2021-06-24

## 2021-06-21 RX ORDER — BETAMETHASONE SODIUM PHOSPHATE AND BETAMETHASONE ACETATE 3; 3 MG/ML; MG/ML
12 INJECTION, SUSPENSION INTRA-ARTICULAR; INTRALESIONAL; INTRAMUSCULAR; SOFT TISSUE ONCE
Status: COMPLETED | OUTPATIENT
Start: 2021-06-21 | End: 2021-06-21

## 2021-06-21 RX ORDER — SODIUM CHLORIDE, SODIUM LACTATE, POTASSIUM CHLORIDE, CALCIUM CHLORIDE 600; 310; 30; 20 MG/100ML; MG/100ML; MG/100ML; MG/100ML
INJECTION, SOLUTION INTRAVENOUS CONTINUOUS
Status: DISCONTINUED | OUTPATIENT
Start: 2021-06-21 | End: 2021-06-21

## 2021-06-21 RX ORDER — ONDANSETRON 8 MG/1
8 TABLET, ORALLY DISINTEGRATING ORAL EVERY 8 HOURS PRN
Status: DISCONTINUED | OUTPATIENT
Start: 2021-06-21 | End: 2021-06-24

## 2021-06-21 RX ORDER — LABETALOL HYDROCHLORIDE 5 MG/ML
20 INJECTION, SOLUTION INTRAVENOUS ONCE
Status: COMPLETED | OUTPATIENT
Start: 2021-06-21 | End: 2021-06-21

## 2021-06-21 RX ORDER — CALCIUM GLUCONATE 98 MG/ML
1 INJECTION, SOLUTION INTRAVENOUS
Status: DISCONTINUED | OUTPATIENT
Start: 2021-06-21 | End: 2021-06-24

## 2021-06-21 RX ORDER — PROCHLORPERAZINE EDISYLATE 5 MG/ML
5 INJECTION INTRAMUSCULAR; INTRAVENOUS EVERY 6 HOURS PRN
Status: DISCONTINUED | OUTPATIENT
Start: 2021-06-21 | End: 2021-06-24

## 2021-06-21 RX ORDER — MAGNESIUM SULFATE HEPTAHYDRATE 40 MG/ML
2 INJECTION, SOLUTION INTRAVENOUS CONTINUOUS
Status: DISCONTINUED | OUTPATIENT
Start: 2021-06-21 | End: 2021-06-24

## 2021-06-21 RX ORDER — ACETAMINOPHEN 500 MG
500 TABLET ORAL EVERY 6 HOURS PRN
Status: DISCONTINUED | OUTPATIENT
Start: 2021-06-21 | End: 2021-06-24

## 2021-06-21 RX ADMIN — ACETAMINOPHEN 500 MG: 500 TABLET, FILM COATED ORAL at 11:06

## 2021-06-21 RX ADMIN — BETAMETHASONE SODIUM PHOSPHATE AND BETAMETHASONE ACETATE 12 MG: 3; 3 INJECTION, SUSPENSION INTRA-ARTICULAR; INTRALESIONAL; INTRAMUSCULAR at 05:06

## 2021-06-21 RX ADMIN — MAGNESIUM SULFATE HEPTAHYDRATE 2 G/HR: 40 INJECTION, SOLUTION INTRAVENOUS at 06:06

## 2021-06-21 RX ADMIN — MAGNESIUM SULFATE HEPTAHYDRATE 40 G: 40 INJECTION, SOLUTION INTRAVENOUS at 06:06

## 2021-06-21 RX ADMIN — LABETALOL HYDROCHLORIDE 20 MG: 5 INJECTION INTRAVENOUS at 05:06

## 2021-06-21 RX ADMIN — SODIUM CHLORIDE, SODIUM LACTATE, POTASSIUM CHLORIDE, AND CALCIUM CHLORIDE 1000 ML: .6; .31; .03; .02 INJECTION, SOLUTION INTRAVENOUS at 06:06

## 2021-06-22 LAB
ALBUMIN SERPL BCP-MCNC: 2.6 G/DL (ref 3.5–5.2)
ALP SERPL-CCNC: 110 U/L (ref 55–135)
ALT SERPL W/O P-5'-P-CCNC: 7 U/L (ref 10–44)
ANION GAP SERPL CALC-SCNC: 10 MMOL/L (ref 8–16)
AST SERPL-CCNC: 10 U/L (ref 10–40)
BASOPHILS # BLD AUTO: 0.03 K/UL (ref 0–0.2)
BASOPHILS NFR BLD: 0.2 % (ref 0–1.9)
BILIRUB SERPL-MCNC: 0.2 MG/DL (ref 0.1–1)
BUN SERPL-MCNC: 4 MG/DL (ref 6–20)
CALCIUM SERPL-MCNC: 7.7 MG/DL (ref 8.7–10.5)
CHLORIDE SERPL-SCNC: 108 MMOL/L (ref 95–110)
CO2 SERPL-SCNC: 20 MMOL/L (ref 23–29)
CREAT SERPL-MCNC: 0.8 MG/DL (ref 0.5–1.4)
CREAT UR-MCNC: 46.7 MG/DL (ref 15–325)
DIFFERENTIAL METHOD: ABNORMAL
EOSINOPHIL # BLD AUTO: 0 K/UL (ref 0–0.5)
EOSINOPHIL NFR BLD: 0 % (ref 0–8)
ERYTHROCYTE [DISTWIDTH] IN BLOOD BY AUTOMATED COUNT: 13.7 % (ref 11.5–14.5)
EST. GFR  (AFRICAN AMERICAN): >60 ML/MIN/1.73 M^2
EST. GFR  (NON AFRICAN AMERICAN): >60 ML/MIN/1.73 M^2
GLUCOSE SERPL-MCNC: 158 MG/DL (ref 70–110)
HCT VFR BLD AUTO: 33.4 % (ref 37–48.5)
HGB BLD-MCNC: 10.4 G/DL (ref 12–16)
IMM GRANULOCYTES # BLD AUTO: 0.11 K/UL (ref 0–0.04)
IMM GRANULOCYTES NFR BLD AUTO: 0.6 % (ref 0–0.5)
LYMPHOCYTES # BLD AUTO: 1.6 K/UL (ref 1–4.8)
LYMPHOCYTES NFR BLD: 8.1 % (ref 18–48)
MCH RBC QN AUTO: 27.1 PG (ref 27–31)
MCHC RBC AUTO-ENTMCNC: 31.1 G/DL (ref 32–36)
MCV RBC AUTO: 87 FL (ref 82–98)
MONOCYTES # BLD AUTO: 0.3 K/UL (ref 0.3–1)
MONOCYTES NFR BLD: 1.4 % (ref 4–15)
NEUTROPHILS # BLD AUTO: 17.6 K/UL (ref 1.8–7.7)
NEUTROPHILS NFR BLD: 89.7 % (ref 38–73)
NRBC BLD-RTO: 0 /100 WBC
PLATELET # BLD AUTO: 248 K/UL (ref 150–450)
PMV BLD AUTO: 11.2 FL (ref 9.2–12.9)
POTASSIUM SERPL-SCNC: 3.9 MMOL/L (ref 3.5–5.1)
PROT SERPL-MCNC: 7.1 G/DL (ref 6–8.4)
PROT UR-MCNC: <7 MG/DL
PROT/CREAT UR: NORMAL MG/G{CREAT} (ref 0–0.2)
RBC # BLD AUTO: 3.84 M/UL (ref 4–5.4)
RUPTURE OF MEMBRANE: NEGATIVE
RUPTURE OF MEMBRANE: NEGATIVE
SODIUM SERPL-SCNC: 138 MMOL/L (ref 136–145)
WBC # BLD AUTO: 19.6 K/UL (ref 3.9–12.7)

## 2021-06-22 PROCEDURE — 63600175 PHARM REV CODE 636 W HCPCS: Performed by: OBSTETRICS & GYNECOLOGY

## 2021-06-22 PROCEDURE — 96368 THER/DIAG CONCURRENT INF: CPT

## 2021-06-22 PROCEDURE — 84112 EVAL AMNIOTIC FLUID PROTEIN: CPT | Mod: 91 | Performed by: OBSTETRICS & GYNECOLOGY

## 2021-06-22 PROCEDURE — 99232 PR SUBSEQUENT HOSPITAL CARE,LEVL II: ICD-10-PCS | Mod: ,,, | Performed by: OBSTETRICS & GYNECOLOGY

## 2021-06-22 PROCEDURE — 25000003 PHARM REV CODE 250: Performed by: OBSTETRICS & GYNECOLOGY

## 2021-06-22 PROCEDURE — 96361 HYDRATE IV INFUSION ADD-ON: CPT

## 2021-06-22 PROCEDURE — 72100003 HC LABOR CARE, EA. ADDL. 8 HRS

## 2021-06-22 PROCEDURE — 85025 COMPLETE CBC W/AUTO DIFF WBC: CPT | Performed by: OBSTETRICS & GYNECOLOGY

## 2021-06-22 PROCEDURE — 72100002 HC LABOR CARE, 1ST 8 HOURS

## 2021-06-22 PROCEDURE — 80053 COMPREHEN METABOLIC PANEL: CPT | Performed by: OBSTETRICS & GYNECOLOGY

## 2021-06-22 PROCEDURE — 84156 ASSAY OF PROTEIN URINE: CPT | Performed by: OBSTETRICS & GYNECOLOGY

## 2021-06-22 PROCEDURE — 96372 THER/PROPH/DIAG INJ SC/IM: CPT

## 2021-06-22 PROCEDURE — 99232 SBSQ HOSP IP/OBS MODERATE 35: CPT | Mod: ,,, | Performed by: OBSTETRICS & GYNECOLOGY

## 2021-06-22 PROCEDURE — 36415 COLL VENOUS BLD VENIPUNCTURE: CPT | Performed by: OBSTETRICS & GYNECOLOGY

## 2021-06-22 PROCEDURE — 11000001 HC ACUTE MED/SURG PRIVATE ROOM

## 2021-06-22 RX ORDER — BETAMETHASONE SODIUM PHOSPHATE AND BETAMETHASONE ACETATE 3; 3 MG/ML; MG/ML
12 INJECTION, SUSPENSION INTRA-ARTICULAR; INTRALESIONAL; INTRAMUSCULAR; SOFT TISSUE ONCE
Status: COMPLETED | OUTPATIENT
Start: 2021-06-22 | End: 2021-06-22

## 2021-06-22 RX ADMIN — SODIUM CHLORIDE, SODIUM LACTATE, POTASSIUM CHLORIDE, AND CALCIUM CHLORIDE 1000 ML: .6; .31; .03; .02 INJECTION, SOLUTION INTRAVENOUS at 09:06

## 2021-06-22 RX ADMIN — DINOPROSTONE 10 MG: 10 INSERT VAGINAL at 06:06

## 2021-06-22 RX ADMIN — SODIUM CHLORIDE, SODIUM LACTATE, POTASSIUM CHLORIDE, AND CALCIUM CHLORIDE 1000 ML: .6; .31; .03; .02 INJECTION, SOLUTION INTRAVENOUS at 07:06

## 2021-06-22 RX ADMIN — BETAMETHASONE SODIUM PHOSPHATE AND BETAMETHASONE ACETATE 12 MG: 3; 3 INJECTION, SUSPENSION INTRA-ARTICULAR; INTRALESIONAL; INTRAMUSCULAR at 05:06

## 2021-06-22 RX ADMIN — MAGNESIUM SULFATE HEPTAHYDRATE 2 G/HR: 40 INJECTION, SOLUTION INTRAVENOUS at 11:06

## 2021-06-22 RX ADMIN — ACETAMINOPHEN 500 MG: 500 TABLET, FILM COATED ORAL at 08:06

## 2021-06-23 PROBLEM — Z3A.34 34 WEEKS GESTATION OF PREGNANCY: Status: ACTIVE | Noted: 2021-06-21

## 2021-06-23 PROCEDURE — 25000003 PHARM REV CODE 250: Performed by: OBSTETRICS & GYNECOLOGY

## 2021-06-23 PROCEDURE — 99232 SBSQ HOSP IP/OBS MODERATE 35: CPT | Mod: ,,, | Performed by: OBSTETRICS & GYNECOLOGY

## 2021-06-23 PROCEDURE — 11000001 HC ACUTE MED/SURG PRIVATE ROOM

## 2021-06-23 PROCEDURE — 99232 PR SUBSEQUENT HOSPITAL CARE,LEVL II: ICD-10-PCS | Mod: ,,, | Performed by: OBSTETRICS & GYNECOLOGY

## 2021-06-23 PROCEDURE — 63600175 PHARM REV CODE 636 W HCPCS: Performed by: OBSTETRICS & GYNECOLOGY

## 2021-06-23 RX ORDER — CALCIUM CARBONATE 200(500)MG
500 TABLET,CHEWABLE ORAL 3 TIMES DAILY PRN
Status: DISCONTINUED | OUTPATIENT
Start: 2021-06-23 | End: 2021-06-24

## 2021-06-23 RX ORDER — DIPHENOXYLATE HYDROCHLORIDE AND ATROPINE SULFATE 2.5; .025 MG/1; MG/1
1 TABLET ORAL 4 TIMES DAILY PRN
Status: DISCONTINUED | OUTPATIENT
Start: 2021-06-23 | End: 2021-06-24

## 2021-06-23 RX ORDER — SIMETHICONE 80 MG
1 TABLET,CHEWABLE ORAL 4 TIMES DAILY PRN
Status: DISCONTINUED | OUTPATIENT
Start: 2021-06-23 | End: 2021-06-24

## 2021-06-23 RX ORDER — OXYTOCIN/RINGER'S LACTATE 30/500 ML
334 PLASTIC BAG, INJECTION (ML) INTRAVENOUS ONCE
Status: DISCONTINUED | OUTPATIENT
Start: 2021-06-23 | End: 2021-06-24

## 2021-06-23 RX ORDER — ONDANSETRON 8 MG/1
8 TABLET, ORALLY DISINTEGRATING ORAL EVERY 8 HOURS PRN
Status: DISCONTINUED | OUTPATIENT
Start: 2021-06-23 | End: 2021-06-24

## 2021-06-23 RX ORDER — TRANEXAMIC ACID 100 MG/ML
1000 INJECTION, SOLUTION INTRAVENOUS ONCE AS NEEDED
Status: DISCONTINUED | OUTPATIENT
Start: 2021-06-23 | End: 2021-06-24

## 2021-06-23 RX ORDER — OXYTOCIN/RINGER'S LACTATE 30/500 ML
95 PLASTIC BAG, INJECTION (ML) INTRAVENOUS ONCE
Status: DISCONTINUED | OUTPATIENT
Start: 2021-06-23 | End: 2021-06-24

## 2021-06-23 RX ORDER — MISOPROSTOL 200 UG/1
800 TABLET ORAL
Status: DISCONTINUED | OUTPATIENT
Start: 2021-06-23 | End: 2021-06-24

## 2021-06-23 RX ORDER — PROCHLORPERAZINE EDISYLATE 5 MG/ML
5 INJECTION INTRAMUSCULAR; INTRAVENOUS EVERY 6 HOURS PRN
Status: DISCONTINUED | OUTPATIENT
Start: 2021-06-23 | End: 2021-06-24

## 2021-06-23 RX ORDER — METHYLERGONOVINE MALEATE 0.2 MG/ML
200 INJECTION INTRAVENOUS
Status: DISCONTINUED | OUTPATIENT
Start: 2021-06-23 | End: 2021-06-24

## 2021-06-23 RX ORDER — CARBOPROST TROMETHAMINE 250 UG/ML
250 INJECTION, SOLUTION INTRAMUSCULAR
Status: DISCONTINUED | OUTPATIENT
Start: 2021-06-23 | End: 2021-06-24

## 2021-06-23 RX ORDER — OXYTOCIN/RINGER'S LACTATE 30/500 ML
0-30 PLASTIC BAG, INJECTION (ML) INTRAVENOUS CONTINUOUS
Status: DISCONTINUED | OUTPATIENT
Start: 2021-06-23 | End: 2021-06-24

## 2021-06-23 RX ADMIN — AMPICILLIN SODIUM 2 G: 2 INJECTION, POWDER, FOR SOLUTION INTRAMUSCULAR; INTRAVENOUS at 07:06

## 2021-06-23 RX ADMIN — DEXTROSE, SODIUM CHLORIDE, SODIUM LACTATE, POTASSIUM CHLORIDE, AND CALCIUM CHLORIDE 250 ML: 5; .6; .31; .03; .02 INJECTION, SOLUTION INTRAVENOUS at 12:06

## 2021-06-23 RX ADMIN — Medication 2 MILLI-UNITS/MIN: at 10:06

## 2021-06-23 RX ADMIN — AMPICILLIN SODIUM 1 G: 1 INJECTION, POWDER, FOR SOLUTION INTRAMUSCULAR; INTRAVENOUS at 10:06

## 2021-06-23 RX ADMIN — AMPICILLIN SODIUM 1 G: 1 INJECTION, POWDER, FOR SOLUTION INTRAMUSCULAR; INTRAVENOUS at 02:06

## 2021-06-23 RX ADMIN — Medication 2 MILLI-UNITS/MIN: at 07:06

## 2021-06-23 RX ADMIN — SODIUM CHLORIDE, SODIUM LACTATE, POTASSIUM CHLORIDE, AND CALCIUM CHLORIDE 1000 ML: .6; .31; .03; .02 INJECTION, SOLUTION INTRAVENOUS at 11:06

## 2021-06-23 RX ADMIN — AMPICILLIN SODIUM 1 G: 1 INJECTION, POWDER, FOR SOLUTION INTRAMUSCULAR; INTRAVENOUS at 11:06

## 2021-06-23 RX ADMIN — MAGNESIUM SULFATE HEPTAHYDRATE 2 G/HR: 40 INJECTION, SOLUTION INTRAVENOUS at 07:06

## 2021-06-24 ENCOUNTER — ANESTHESIA (OUTPATIENT)
Dept: OBSTETRICS AND GYNECOLOGY | Facility: HOSPITAL | Age: 21
End: 2021-06-24
Payer: MEDICAID

## 2021-06-24 ENCOUNTER — ANESTHESIA EVENT (OUTPATIENT)
Dept: OBSTETRICS AND GYNECOLOGY | Facility: HOSPITAL | Age: 21
End: 2021-06-24
Payer: MEDICAID

## 2021-06-24 PROBLEM — Z3A.34 34 WEEKS GESTATION OF PREGNANCY: Status: RESOLVED | Noted: 2021-06-21 | Resolved: 2021-06-24

## 2021-06-24 PROCEDURE — 59409 OBSTETRICAL CARE: CPT | Mod: AT,,, | Performed by: OBSTETRICS & GYNECOLOGY

## 2021-06-24 PROCEDURE — 11000001 HC ACUTE MED/SURG PRIVATE ROOM

## 2021-06-24 PROCEDURE — 51702 INSERT TEMP BLADDER CATH: CPT

## 2021-06-24 PROCEDURE — 59409 PR OBSTETRICAL CARE,VAG DELIV ONLY: ICD-10-PCS | Mod: AT,,, | Performed by: OBSTETRICS & GYNECOLOGY

## 2021-06-24 PROCEDURE — 63600175 PHARM REV CODE 636 W HCPCS: Performed by: OBSTETRICS & GYNECOLOGY

## 2021-06-24 PROCEDURE — 25000003 PHARM REV CODE 250: Performed by: OBSTETRICS & GYNECOLOGY

## 2021-06-24 PROCEDURE — 72100003 HC LABOR CARE, EA. ADDL. 8 HRS

## 2021-06-24 PROCEDURE — C1751 CATH, INF, PER/CENT/MIDLINE: HCPCS | Performed by: ANESTHESIOLOGY

## 2021-06-24 PROCEDURE — 72200005 HC VAGINAL DELIVERY LEVEL II

## 2021-06-24 PROCEDURE — 88307 TISSUE EXAM BY PATHOLOGIST: CPT | Performed by: PATHOLOGY

## 2021-06-24 PROCEDURE — 72200006 HC VAGINAL DELIVERY LEVEL III

## 2021-06-24 PROCEDURE — 63600175 PHARM REV CODE 636 W HCPCS: Performed by: ANESTHESIOLOGY

## 2021-06-24 PROCEDURE — 62326 NJX INTERLAMINAR LMBR/SAC: CPT | Performed by: ANESTHESIOLOGY

## 2021-06-24 PROCEDURE — 88307 PR  SURG PATH,LEVEL V: ICD-10-PCS | Mod: 26,,, | Performed by: PATHOLOGY

## 2021-06-24 PROCEDURE — 88307 TISSUE EXAM BY PATHOLOGIST: CPT | Mod: 26,,, | Performed by: PATHOLOGY

## 2021-06-24 PROCEDURE — 27200710 HC EPIDURAL INFUSION PUMP SET: Performed by: ANESTHESIOLOGY

## 2021-06-24 PROCEDURE — 59409 PRA ETRICAL CARE,VAG DELIV ONLY: ICD-10-PCS | Mod: AA,,, | Performed by: ANESTHESIOLOGY

## 2021-06-24 PROCEDURE — 59409 OBSTETRICAL CARE: CPT | Mod: AA,,, | Performed by: ANESTHESIOLOGY

## 2021-06-24 PROCEDURE — 25000003 PHARM REV CODE 250: Performed by: ANESTHESIOLOGY

## 2021-06-24 RX ORDER — BUPIVACAINE HYDROCHLORIDE 2.5 MG/ML
INJECTION, SOLUTION EPIDURAL; INFILTRATION; INTRACAUDAL
Status: DISCONTINUED | OUTPATIENT
Start: 2021-06-24 | End: 2021-06-24

## 2021-06-24 RX ORDER — ACETAMINOPHEN 325 MG/1
650 TABLET ORAL EVERY 6 HOURS PRN
Status: DISCONTINUED | OUTPATIENT
Start: 2021-06-24 | End: 2021-06-26 | Stop reason: HOSPADM

## 2021-06-24 RX ORDER — ROPIVACAINE HYDROCHLORIDE 2 MG/ML
INJECTION, SOLUTION EPIDURAL; INFILTRATION; PERINEURAL CONTINUOUS PRN
Status: DISCONTINUED | OUTPATIENT
Start: 2021-06-24 | End: 2021-06-24

## 2021-06-24 RX ORDER — OXYCODONE AND ACETAMINOPHEN 5; 325 MG/1; MG/1
1 TABLET ORAL EVERY 4 HOURS PRN
Status: DISCONTINUED | OUTPATIENT
Start: 2021-06-24 | End: 2021-06-26 | Stop reason: HOSPADM

## 2021-06-24 RX ORDER — ONDANSETRON 8 MG/1
8 TABLET, ORALLY DISINTEGRATING ORAL EVERY 8 HOURS PRN
Status: DISCONTINUED | OUTPATIENT
Start: 2021-06-24 | End: 2021-06-26 | Stop reason: HOSPADM

## 2021-06-24 RX ORDER — SIMETHICONE 80 MG
1 TABLET,CHEWABLE ORAL EVERY 6 HOURS PRN
Status: DISCONTINUED | OUTPATIENT
Start: 2021-06-24 | End: 2021-06-26 | Stop reason: HOSPADM

## 2021-06-24 RX ORDER — AMOXICILLIN 250 MG
1 CAPSULE ORAL NIGHTLY
Status: DISCONTINUED | OUTPATIENT
Start: 2021-06-24 | End: 2021-06-26 | Stop reason: HOSPADM

## 2021-06-24 RX ORDER — OXYTOCIN/RINGER'S LACTATE 30/500 ML
95 PLASTIC BAG, INJECTION (ML) INTRAVENOUS ONCE
Status: DISCONTINUED | OUTPATIENT
Start: 2021-06-24 | End: 2021-06-26 | Stop reason: HOSPADM

## 2021-06-24 RX ORDER — HYDROCORTISONE 25 MG/G
CREAM TOPICAL 3 TIMES DAILY PRN
Status: DISCONTINUED | OUTPATIENT
Start: 2021-06-24 | End: 2021-06-26 | Stop reason: HOSPADM

## 2021-06-24 RX ORDER — SODIUM CHLORIDE 0.9 % (FLUSH) 0.9 %
10 SYRINGE (ML) INJECTION
Status: DISCONTINUED | OUTPATIENT
Start: 2021-06-24 | End: 2021-06-26 | Stop reason: HOSPADM

## 2021-06-24 RX ORDER — FENTANYL CITRATE 50 UG/ML
INJECTION, SOLUTION INTRAMUSCULAR; INTRAVENOUS
Status: DISCONTINUED | OUTPATIENT
Start: 2021-06-24 | End: 2021-06-24

## 2021-06-24 RX ORDER — IBUPROFEN 600 MG/1
600 TABLET ORAL EVERY 6 HOURS
Status: DISCONTINUED | OUTPATIENT
Start: 2021-06-24 | End: 2021-06-26 | Stop reason: HOSPADM

## 2021-06-24 RX ORDER — DIPHENHYDRAMINE HCL 25 MG
25 CAPSULE ORAL EVERY 4 HOURS PRN
Status: DISCONTINUED | OUTPATIENT
Start: 2021-06-24 | End: 2021-06-26 | Stop reason: HOSPADM

## 2021-06-24 RX ORDER — MISOPROSTOL 200 UG/1
200 TABLET ORAL ONCE
Status: DISCONTINUED | OUTPATIENT
Start: 2021-06-24 | End: 2021-06-26 | Stop reason: HOSPADM

## 2021-06-24 RX ORDER — PROCHLORPERAZINE EDISYLATE 5 MG/ML
5 INJECTION INTRAMUSCULAR; INTRAVENOUS EVERY 6 HOURS PRN
Status: DISCONTINUED | OUTPATIENT
Start: 2021-06-24 | End: 2021-06-26 | Stop reason: HOSPADM

## 2021-06-24 RX ADMIN — SODIUM CHLORIDE, SODIUM LACTATE, POTASSIUM CHLORIDE, AND CALCIUM CHLORIDE 1000 ML: .6; .31; .03; .02 INJECTION, SOLUTION INTRAVENOUS at 09:06

## 2021-06-24 RX ADMIN — DOCUSATE SODIUM 50 MG AND SENNOSIDES 8.6 MG 1 TABLET: 8.6; 5 TABLET, FILM COATED ORAL at 08:06

## 2021-06-24 RX ADMIN — OXYCODONE HYDROCHLORIDE AND ACETAMINOPHEN 1 TABLET: 5; 325 TABLET ORAL at 04:06

## 2021-06-24 RX ADMIN — ROPIVACAINE HYDROCHLORIDE 7 ML/HR: 2 INJECTION, SOLUTION EPIDURAL; INFILTRATION at 05:06

## 2021-06-24 RX ADMIN — IBUPROFEN 600 MG: 600 TABLET ORAL at 11:06

## 2021-06-24 RX ADMIN — SODIUM CHLORIDE, SODIUM LACTATE, POTASSIUM CHLORIDE, AND CALCIUM CHLORIDE 1000 ML: .6; .31; .03; .02 INJECTION, SOLUTION INTRAVENOUS at 12:06

## 2021-06-24 RX ADMIN — IBUPROFEN 600 MG: 600 TABLET ORAL at 07:06

## 2021-06-24 RX ADMIN — FENTANYL CITRATE 100 MCG: 50 INJECTION, SOLUTION INTRAMUSCULAR; INTRAVENOUS at 05:06

## 2021-06-24 RX ADMIN — AMPICILLIN SODIUM 1 G: 1 INJECTION, POWDER, FOR SOLUTION INTRAMUSCULAR; INTRAVENOUS at 03:06

## 2021-06-24 RX ADMIN — MAGNESIUM SULFATE HEPTAHYDRATE 2 G/HR: 40 INJECTION, SOLUTION INTRAVENOUS at 04:06

## 2021-06-24 RX ADMIN — BUPIVACAINE HYDROCHLORIDE 6 MG: 2.5 INJECTION, SOLUTION EPIDURAL; INFILTRATION; INTRACAUDAL; PERINEURAL at 05:06

## 2021-06-25 LAB
BASOPHILS # BLD AUTO: 0.01 K/UL (ref 0–0.2)
BASOPHILS NFR BLD: 0.1 % (ref 0–1.9)
DIFFERENTIAL METHOD: ABNORMAL
EOSINOPHIL # BLD AUTO: 0 K/UL (ref 0–0.5)
EOSINOPHIL NFR BLD: 0.3 % (ref 0–8)
ERYTHROCYTE [DISTWIDTH] IN BLOOD BY AUTOMATED COUNT: 13.7 % (ref 11.5–14.5)
HCT VFR BLD AUTO: 27 % (ref 37–48.5)
HGB BLD-MCNC: 8.2 G/DL (ref 12–16)
IMM GRANULOCYTES # BLD AUTO: 0.08 K/UL (ref 0–0.04)
IMM GRANULOCYTES NFR BLD AUTO: 0.7 % (ref 0–0.5)
LYMPHOCYTES # BLD AUTO: 3.1 K/UL (ref 1–4.8)
LYMPHOCYTES NFR BLD: 26.5 % (ref 18–48)
MCH RBC QN AUTO: 27.6 PG (ref 27–31)
MCHC RBC AUTO-ENTMCNC: 30.4 G/DL (ref 32–36)
MCV RBC AUTO: 91 FL (ref 82–98)
MONOCYTES # BLD AUTO: 1 K/UL (ref 0.3–1)
MONOCYTES NFR BLD: 8.6 % (ref 4–15)
NEUTROPHILS # BLD AUTO: 7.5 K/UL (ref 1.8–7.7)
NEUTROPHILS NFR BLD: 63.8 % (ref 38–73)
NRBC BLD-RTO: 0 /100 WBC
PLATELET # BLD AUTO: 176 K/UL (ref 150–450)
PMV BLD AUTO: 11 FL (ref 9.2–12.9)
RBC # BLD AUTO: 2.97 M/UL (ref 4–5.4)
WBC # BLD AUTO: 11.82 K/UL (ref 3.9–12.7)

## 2021-06-25 PROCEDURE — 25000003 PHARM REV CODE 250: Performed by: OBSTETRICS & GYNECOLOGY

## 2021-06-25 PROCEDURE — 63600175 PHARM REV CODE 636 W HCPCS: Performed by: OBSTETRICS & GYNECOLOGY

## 2021-06-25 PROCEDURE — 36415 COLL VENOUS BLD VENIPUNCTURE: CPT | Performed by: OBSTETRICS & GYNECOLOGY

## 2021-06-25 PROCEDURE — 11000001 HC ACUTE MED/SURG PRIVATE ROOM

## 2021-06-25 PROCEDURE — 85025 COMPLETE CBC W/AUTO DIFF WBC: CPT | Performed by: OBSTETRICS & GYNECOLOGY

## 2021-06-25 RX ORDER — MAGNESIUM SULFATE HEPTAHYDRATE 40 MG/ML
2 INJECTION, SOLUTION INTRAVENOUS CONTINUOUS
Status: DISCONTINUED | OUTPATIENT
Start: 2021-06-25 | End: 2021-06-25

## 2021-06-25 RX ADMIN — OXYCODONE HYDROCHLORIDE AND ACETAMINOPHEN 1 TABLET: 5; 325 TABLET ORAL at 05:06

## 2021-06-25 RX ADMIN — IBUPROFEN 600 MG: 600 TABLET ORAL at 06:06

## 2021-06-25 RX ADMIN — MAGNESIUM SULFATE HEPTAHYDRATE 2 G/HR: 40 INJECTION, SOLUTION INTRAVENOUS at 12:06

## 2021-06-25 RX ADMIN — IBUPROFEN 600 MG: 600 TABLET ORAL at 11:06

## 2021-06-25 RX ADMIN — OXYCODONE HYDROCHLORIDE AND ACETAMINOPHEN 1 TABLET: 5; 325 TABLET ORAL at 07:06

## 2021-06-25 RX ADMIN — IBUPROFEN 600 MG: 600 TABLET ORAL at 12:06

## 2021-06-25 RX ADMIN — DOCUSATE SODIUM 50 MG AND SENNOSIDES 8.6 MG 1 TABLET: 8.6; 5 TABLET, FILM COATED ORAL at 08:06

## 2021-06-25 RX ADMIN — OXYCODONE HYDROCHLORIDE AND ACETAMINOPHEN 1 TABLET: 5; 325 TABLET ORAL at 10:06

## 2021-06-26 VITALS
SYSTOLIC BLOOD PRESSURE: 136 MMHG | HEART RATE: 71 BPM | OXYGEN SATURATION: 100 % | TEMPERATURE: 97 F | RESPIRATION RATE: 20 BRPM | BODY MASS INDEX: 32.18 KG/M2 | DIASTOLIC BLOOD PRESSURE: 92 MMHG | HEIGHT: 64 IN | WEIGHT: 188.5 LBS

## 2021-06-26 PROCEDURE — 99231 SBSQ HOSP IP/OBS SF/LOW 25: CPT | Mod: ,,, | Performed by: OBSTETRICS & GYNECOLOGY

## 2021-06-26 PROCEDURE — 25000003 PHARM REV CODE 250: Performed by: OBSTETRICS & GYNECOLOGY

## 2021-06-26 PROCEDURE — 99231 PR SUBSEQUENT HOSPITAL CARE,LEVL I: ICD-10-PCS | Mod: ,,, | Performed by: OBSTETRICS & GYNECOLOGY

## 2021-06-26 PROCEDURE — 99238 HOSP IP/OBS DSCHRG MGMT 30/<: CPT | Mod: ,,, | Performed by: OBSTETRICS & GYNECOLOGY

## 2021-06-26 PROCEDURE — 99238 PR HOSPITAL DISCHARGE DAY,<30 MIN: ICD-10-PCS | Mod: ,,, | Performed by: OBSTETRICS & GYNECOLOGY

## 2021-06-26 RX ORDER — DOCUSATE SODIUM 100 MG/1
100 CAPSULE, LIQUID FILLED ORAL 2 TIMES DAILY
Qty: 60 CAPSULE | Refills: 1 | Status: SHIPPED | OUTPATIENT
Start: 2021-06-26 | End: 2021-07-26

## 2021-06-26 RX ORDER — IBUPROFEN 800 MG/1
800 TABLET ORAL EVERY 8 HOURS PRN
Qty: 60 TABLET | Refills: 2 | Status: SHIPPED | OUTPATIENT
Start: 2021-06-26 | End: 2021-07-26

## 2021-06-26 RX ORDER — ACETAMINOPHEN 500 MG
1000 TABLET ORAL EVERY 6 HOURS PRN
Qty: 100 TABLET | Refills: 2 | Status: SHIPPED | OUTPATIENT
Start: 2021-06-26 | End: 2022-05-04

## 2021-06-26 RX ADMIN — OXYCODONE HYDROCHLORIDE AND ACETAMINOPHEN 1 TABLET: 5; 325 TABLET ORAL at 01:06

## 2021-06-26 RX ADMIN — IBUPROFEN 600 MG: 600 TABLET ORAL at 12:06

## 2021-06-26 RX ADMIN — OXYCODONE HYDROCHLORIDE AND ACETAMINOPHEN 1 TABLET: 5; 325 TABLET ORAL at 08:06

## 2021-06-26 RX ADMIN — IBUPROFEN 600 MG: 600 TABLET ORAL at 05:06

## 2021-06-29 ENCOUNTER — POSTPARTUM VISIT (OUTPATIENT)
Dept: OBSTETRICS AND GYNECOLOGY | Facility: CLINIC | Age: 21
End: 2021-06-29
Payer: MEDICAID

## 2021-06-29 VITALS
SYSTOLIC BLOOD PRESSURE: 140 MMHG | HEIGHT: 64 IN | WEIGHT: 183 LBS | DIASTOLIC BLOOD PRESSURE: 78 MMHG | BODY MASS INDEX: 31.24 KG/M2

## 2021-06-29 DIAGNOSIS — M54.50 ACUTE MIDLINE LOW BACK PAIN WITHOUT SCIATICA: ICD-10-CM

## 2021-06-29 PROCEDURE — 99213 PR OFFICE/OUTPT VISIT, EST, LEVL III, 20-29 MIN: ICD-10-PCS | Mod: S$PBB,TH,, | Performed by: OBSTETRICS & GYNECOLOGY

## 2021-06-29 PROCEDURE — 99999 PR PBB SHADOW E&M-EST. PATIENT-LVL III: CPT | Mod: PBBFAC,,, | Performed by: OBSTETRICS & GYNECOLOGY

## 2021-06-29 PROCEDURE — 99213 OFFICE O/P EST LOW 20 MIN: CPT | Mod: S$PBB,TH,, | Performed by: OBSTETRICS & GYNECOLOGY

## 2021-06-29 PROCEDURE — 99999 PR PBB SHADOW E&M-EST. PATIENT-LVL III: ICD-10-PCS | Mod: PBBFAC,,, | Performed by: OBSTETRICS & GYNECOLOGY

## 2021-06-29 PROCEDURE — 99213 OFFICE O/P EST LOW 20 MIN: CPT | Mod: PBBFAC,TH | Performed by: OBSTETRICS & GYNECOLOGY

## 2021-06-29 RX ORDER — LABETALOL 100 MG/1
100 TABLET, FILM COATED ORAL 2 TIMES DAILY
Qty: 60 TABLET | Refills: 1 | Status: SHIPPED | OUTPATIENT
Start: 2021-06-29 | End: 2022-05-04

## 2021-07-09 ENCOUNTER — TELEPHONE (OUTPATIENT)
Dept: OBSTETRICS AND GYNECOLOGY | Facility: HOSPITAL | Age: 21
End: 2021-07-09

## 2021-07-09 LAB
FINAL PATHOLOGIC DIAGNOSIS: NORMAL
GROSS: NORMAL
Lab: NORMAL

## 2021-07-09 NOTE — TELEPHONE ENCOUNTER
"Spoke to pt who states continues pumping for baby -baby "does not like my nipple" reinforced baby is probably nipple confused and could learn to do the breast -mother states baby doing well taking her milk from a bottle and has no questions or concerns for us at this time    "

## 2021-07-26 ENCOUNTER — POSTPARTUM VISIT (OUTPATIENT)
Dept: OBSTETRICS AND GYNECOLOGY | Facility: CLINIC | Age: 21
End: 2021-07-26
Payer: MEDICAID

## 2021-07-26 VITALS
WEIGHT: 179.69 LBS | DIASTOLIC BLOOD PRESSURE: 70 MMHG | HEIGHT: 64 IN | SYSTOLIC BLOOD PRESSURE: 120 MMHG | BODY MASS INDEX: 30.68 KG/M2

## 2021-07-26 DIAGNOSIS — Z30.09 FAMILY PLANNING: ICD-10-CM

## 2021-07-26 DIAGNOSIS — Z87.59 HISTORY OF PREGNANCY INDUCED HYPERTENSION: ICD-10-CM

## 2021-07-26 PROCEDURE — 59430 PR CARE AFTER DELIVERY ONLY: ICD-10-PCS | Mod: ,,, | Performed by: OBSTETRICS & GYNECOLOGY

## 2021-07-26 PROCEDURE — 99213 OFFICE O/P EST LOW 20 MIN: CPT | Mod: PBBFAC | Performed by: OBSTETRICS & GYNECOLOGY

## 2021-07-26 PROCEDURE — 99999 PR PBB SHADOW E&M-EST. PATIENT-LVL III: ICD-10-PCS | Mod: PBBFAC,,, | Performed by: OBSTETRICS & GYNECOLOGY

## 2021-07-26 PROCEDURE — 88175 CYTOPATH C/V AUTO FLUID REDO: CPT | Performed by: OBSTETRICS & GYNECOLOGY

## 2021-07-26 PROCEDURE — 99999 PR PBB SHADOW E&M-EST. PATIENT-LVL III: CPT | Mod: PBBFAC,,, | Performed by: OBSTETRICS & GYNECOLOGY

## 2021-07-26 RX ORDER — NORETHINDRONE ACETATE AND ETHINYL ESTRADIOL .02; 1 MG/1; MG/1
1 TABLET ORAL DAILY
Qty: 30 TABLET | Refills: 11 | Status: SHIPPED | OUTPATIENT
Start: 2021-07-26 | End: 2021-10-18

## 2021-08-02 LAB
FINAL PATHOLOGIC DIAGNOSIS: NORMAL
Lab: NORMAL

## 2021-10-18 DIAGNOSIS — Z30.09 FAMILY PLANNING: ICD-10-CM

## 2021-10-18 RX ORDER — TIMOLOL MALEATE 5 MG/ML
SOLUTION/ DROPS OPHTHALMIC
Qty: 28 TABLET | Refills: 6 | Status: SHIPPED | OUTPATIENT
Start: 2021-10-18 | End: 2021-10-19 | Stop reason: SDUPTHER

## 2021-10-19 DIAGNOSIS — N94.6 DYSMENORRHEA: Primary | ICD-10-CM

## 2021-10-19 RX ORDER — LEVONORGESTREL AND ETHINYL ESTRADIOL 0.1-0.02MG
1 KIT ORAL DAILY
Qty: 28 TABLET | Refills: 6 | Status: SHIPPED | OUTPATIENT
Start: 2021-10-19 | End: 2022-05-04

## 2021-10-19 RX ORDER — IBUPROFEN 600 MG/1
600 TABLET ORAL EVERY 6 HOURS PRN
Qty: 60 TABLET | Refills: 2 | Status: SHIPPED | OUTPATIENT
Start: 2021-10-19 | End: 2022-05-04

## 2022-01-18 ENCOUNTER — OCCUPATIONAL HEALTH (OUTPATIENT)
Dept: URGENT CARE | Facility: CLINIC | Age: 22
End: 2022-01-18
Payer: MEDICAID

## 2022-01-18 DIAGNOSIS — Z11.1 PPD SCREENING TEST: Primary | ICD-10-CM

## 2022-01-18 DIAGNOSIS — Z02.83 ENCOUNTER FOR DRUG SCREENING: ICD-10-CM

## 2022-01-18 LAB
CTP QC/QA: YES
POC 5 PANEL DRUG SCREEN: NEGATIVE

## 2022-01-18 PROCEDURE — 86580 POCT TB SKIN TEST: ICD-10-PCS | Mod: S$GLB,,, | Performed by: PREVENTIVE MEDICINE

## 2022-01-18 PROCEDURE — 80305 POCT RAPID DRUG SCREEN 5 PANEL: ICD-10-PCS | Mod: S$GLB,,, | Performed by: PREVENTIVE MEDICINE

## 2022-01-18 PROCEDURE — 86580 TB INTRADERMAL TEST: CPT | Mod: S$GLB,,, | Performed by: PREVENTIVE MEDICINE

## 2022-01-18 PROCEDURE — 80305 DRUG TEST PRSMV DIR OPT OBS: CPT | Mod: S$GLB,,, | Performed by: PREVENTIVE MEDICINE

## 2022-01-20 LAB
TB INDURATION - 48 HR READ: 0 MM
TB INDURATION - 72 HR READ: 0 MM
TB SKIN TEST - 48 HR READ: NEGATIVE
TB SKIN TEST - 72 HR READ: NEGATIVE

## 2022-03-18 ENCOUNTER — HOSPITAL ENCOUNTER (EMERGENCY)
Facility: HOSPITAL | Age: 22
Discharge: HOME | End: 2022-03-18
Attending: EMERGENCY MEDICINE
Payer: MEDICAID

## 2022-03-18 VITALS
TEMPERATURE: 98 F | HEART RATE: 94 BPM | DIASTOLIC BLOOD PRESSURE: 74 MMHG | RESPIRATION RATE: 16 BRPM | BODY MASS INDEX: 31.24 KG/M2 | HEIGHT: 64 IN | WEIGHT: 183 LBS | SYSTOLIC BLOOD PRESSURE: 129 MMHG | OXYGEN SATURATION: 100 %

## 2022-03-18 DIAGNOSIS — J45.901 MILD ASTHMA WITH EXACERBATION, UNSPECIFIED WHETHER PERSISTENT: Primary | ICD-10-CM

## 2022-03-18 PROCEDURE — 99284 PR EMERGENCY DEPT VISIT,LEVEL IV: ICD-10-PCS | Mod: ,,, | Performed by: EMERGENCY MEDICINE

## 2022-03-18 PROCEDURE — 99283 EMERGENCY DEPT VISIT LOW MDM: CPT

## 2022-03-18 PROCEDURE — 63600175 PHARM REV CODE 636 W HCPCS: Performed by: EMERGENCY MEDICINE

## 2022-03-18 PROCEDURE — 25000003 PHARM REV CODE 250: Performed by: EMERGENCY MEDICINE

## 2022-03-18 PROCEDURE — 99284 EMERGENCY DEPT VISIT MOD MDM: CPT | Mod: ,,, | Performed by: EMERGENCY MEDICINE

## 2022-03-18 RX ORDER — ACETAMINOPHEN 325 MG/1
650 TABLET ORAL
Status: COMPLETED | OUTPATIENT
Start: 2022-03-18 | End: 2022-03-18

## 2022-03-18 RX ORDER — PREDNISONE 20 MG/1
60 TABLET ORAL
Status: COMPLETED | OUTPATIENT
Start: 2022-03-18 | End: 2022-03-18

## 2022-03-18 RX ORDER — PREDNISONE 50 MG/1
50 TABLET ORAL DAILY
Qty: 5 TABLET | Refills: 0 | Status: SHIPPED | OUTPATIENT
Start: 2022-03-18 | End: 2022-03-23

## 2022-03-18 RX ADMIN — ACETAMINOPHEN 650 MG: 325 TABLET ORAL at 01:03

## 2022-03-18 RX ADMIN — PREDNISONE 60 MG: 20 TABLET ORAL at 01:03

## 2022-03-18 NOTE — ED NOTES
LOC: The patient is awake, alert, and oriented to self, place, time, and situation. Pt is calm and cooperative. Affect is appropriate.  Speech is appropriate and clear.     APPEARANCE: Patient resting comfortably in no acute distress.  Patient is clean and well groomed.    SKIN: The skin is warm and dry; color consistent with ethnicity.  Patient has normal skin turgor and moist mucus membranes.  Skin intact; no breakdown or bruising noted.     MUSCULOSKELETAL: Patient moving upper and lower extremities without difficulty; denies pain in the extremities or back.  Denies weakness.     RESPIRATORY: Airway is open and patent. Respirations spontaneous, even, easy, and non-labored.  Patient has a normal effort and rate.  No accessory muscle use noted. Denies cough.  Pt states she had an asthma attack, sneezing, earlier at work in the [employer].     CARDIAC:  No peripheral edema noted. No complaints of chest pain.      ABDOMEN: Soft and non tender to palpation.  No distention noted. Pt denies abdominal pain; denies nausea, vomiting, diarrhea, or constipation.    NEUROLOGIC: Eyes open spontaneously.  Behavior appropriate to situation.  Follows commands; facial expression symmetrical.  Purposeful motor response noted; normal sensation in all extremities. Pt denies headache; denies lightheadedness or dizziness; denies visual disturbances; denies loss of balance; denies unilateral weakness.

## 2022-03-18 NOTE — CODE/ RAPID DOCUMENTATION
RAPID RESPONSE NURSE NOTE        Admit Date: 3/18/2022  LOS: 0  Code Status: Prior   Date of Consult: 2022  : 2000  Age: 21 y.o.  Weight:   Wt Readings from Last 1 Encounters:   22 83 kg (183 lb)     Sex: female  Race: Black or    [facility]   MRN: [id number]  Time Rapid Response Team page Received: 1205  Time Rapid Response Team at Bedside: 1204  Time Rapid Response Team left Bedside: 1215  Was the patient discharged from an ICU this admission? No  Was the patient discharged from a PACU within last 24 hours? No   Did the patient receive conscious sedation/general anesthesia in last 24 hours? No  Was the patient in the ED within the past 24 hours? No  Was the patient on NIPPV within the past 24 hours? No   Did this progress into an ARC or CPA: No  Attending Physician: LENNIE  Primary Service: NA    SITUATION    Notified by overhead page.  Reason for alert: Respiratory distress  Called to evaluate the patient for Respiratory    BACKGROUND     Why is the patient in the hospital?: n/a    Patient has a past medical history of Asthma and Hypertension affecting pregnancy in third trimester.    ASSESSMENT/INTERVENTIONS    What did you find:     Employee found outside of [facility] sitting on ground. She is alert, unable to speak, in visible respiratory distress. She is able to nod her head yes and shake no. By stander MD arrived with albuterol inhaler. Patient self administered three puffs with improvement in symptoms and ability to speak returned. Patient was assisted to wheelchair and transported to the emergency room for further evaluation.     RECOMMENDATIONS    We recommend: ED transfer and evaluation.    PROVIDER ESCALATION    Orders received and case discussed with NA.    FOLLOW UP    Call the Rapid Response Nurse, Liliana Hilario RN at [phone] for additional questions or concerns.

## 2022-03-21 ENCOUNTER — HOSPITAL ENCOUNTER (EMERGENCY)
Facility: HOSPITAL | Age: 22
Discharge: HOME | End: 2022-03-21
Attending: EMERGENCY MEDICINE
Payer: MEDICAID

## 2022-03-21 VITALS
TEMPERATURE: 98 F | BODY MASS INDEX: 29.41 KG/M2 | HEIGHT: 66 IN | HEART RATE: 85 BPM | OXYGEN SATURATION: 100 % | SYSTOLIC BLOOD PRESSURE: 121 MMHG | DIASTOLIC BLOOD PRESSURE: 65 MMHG | RESPIRATION RATE: 18 BRPM | WEIGHT: 183 LBS

## 2022-03-21 DIAGNOSIS — J06.9 VIRAL URI WITH COUGH: Primary | ICD-10-CM

## 2022-03-21 PROCEDURE — 87502 INFLUENZA DNA AMP PROBE: CPT

## 2022-03-21 PROCEDURE — U0005 INFEC AGEN DETEC AMPLI PROBE: HCPCS | Performed by: EMERGENCY MEDICINE

## 2022-03-21 PROCEDURE — 99284 EMERGENCY DEPT VISIT MOD MDM: CPT | Mod: 25

## 2022-03-21 PROCEDURE — 25000003 PHARM REV CODE 250: Performed by: NURSE PRACTITIONER

## 2022-03-21 PROCEDURE — 81025 URINE PREGNANCY TEST: CPT | Performed by: EMERGENCY MEDICINE

## 2022-03-21 PROCEDURE — U0003 INFECTIOUS AGENT DETECTION BY NUCLEIC ACID (DNA OR RNA); SEVERE ACUTE RESPIRATORY SYNDROME CORONAVIRUS 2 (SARS-COV-2) (CORONAVIRUS DISEASE [COVID-19]), AMPLIFIED PROBE TECHNIQUE, MAKING USE OF HIGH THROUGHPUT TECHNOLOGIES AS DESCRIBED BY CMS-2020-01-R: HCPCS | Performed by: EMERGENCY MEDICINE

## 2022-03-21 RX ORDER — BENZONATATE 100 MG/1
200 CAPSULE ORAL 3 TIMES DAILY PRN
Qty: 20 CAPSULE | Refills: 0 | Status: SHIPPED | OUTPATIENT
Start: 2022-03-21 | End: 2022-03-31

## 2022-03-21 RX ORDER — IBUPROFEN 600 MG/1
600 TABLET ORAL
Status: COMPLETED | OUTPATIENT
Start: 2022-03-21 | End: 2022-03-21

## 2022-03-21 RX ORDER — NAPROXEN 500 MG/1
500 TABLET ORAL 2 TIMES DAILY WITH MEALS
Qty: 20 TABLET | Refills: 0 | Status: SHIPPED | OUTPATIENT
Start: 2022-03-21 | End: 2022-03-31

## 2022-03-21 RX ADMIN — IBUPROFEN 600 MG: 600 TABLET ORAL at 06:03

## 2022-03-21 NOTE — FIRST PROVIDER EVALUATION
Emergency Department TeleTriage Encounter Note      CHIEF COMPLAINT    Chief Complaint   Patient presents with    Nasal Congestion     Pt to ER with c/o body aches, bilateral ear pain, nausea, headache x 1 day        VITAL SIGNS   Initial Vitals [03/21/22 1647]   BP Pulse Resp Temp SpO2   119/67 91 18 98.5 °F (36.9 °C) 100 %      MAP       --            ALLERGIES    Review of patient's allergies indicates:   Allergen Reactions    Fish containing products Rash       PROVIDER TRIAGE NOTE  This is a teletriage evaluation of a 21 y.o. female presenting to the ED complaining of body aches, ear pain, nausea, and headache starting today. Denies fever.     Initial orders will be placed and care will be transferred to an alternate provider when patient is roomed for a full evaluation. Any additional orders and the final disposition will be determined by that provider.           ORDERS  Labs Reviewed   SARS-COV-2 (COVID-19) QUALITATIVE PCR   POCT INFLUENZA A/B MOLECULAR   POCT URINE PREGNANCY       ED Orders (720h ago, onward)    Start Ordered     Status Ordering Provider    03/21/22 1730 03/21/22 1723  ibuprofen tablet 600 mg  ED 1 Time         Ordered LAKHWINDER GRIFFIN N.    03/21/22 1723 03/21/22 1722  Airborne and Contact and Droplet Isolation Status  Continuous         Ordered LAKHWINDER GRIFFIN N.    03/21/22 1650 03/21/22 1649  POCT Influenza A/B Molecular  Once         Final result RIOS BERNARDO    03/21/22 1650 03/21/22 1649  POCT urine pregnancy  Once         Final result RIOS BERNARDO    03/21/22 1650 03/21/22 1649  COVID-19 Routine Screening  STAT         In process RIOS BERNARDO            Virtual Visit Note: The provider triage portion of this emergency department evaluation and documentation was performed via [employer], a HIPAA-compliant telemedicine application, in concert with a tele-presenter in the room. A face to face patient evaluation with one of my colleagues will occur once the  patient is placed in an emergency department room.      DISCLAIMER: This note was prepared with [employer] voice recognition transcription software. Garbled syntax, mangled pronouns, and other bizarre constructions may be attributed to that software system.

## 2022-03-21 NOTE — ED PROVIDER NOTES
Encounter Date: 3/21/2022       History     Chief Complaint   Patient presents with    Nasal Congestion     Pt to ER with c/o body aches, bilateral ear pain, nausea, headache x 1 day      21 year old with history of asthma presents ER for evaluation of nasal congestion.  Patient reports symptoms started yesterday.  Complains of headache, bilateral ear pain, body aches.  Reports she has had a slightly productive cough.  No associated shortness of breath.  She denies any abdominal pain, vomiting or diarrhea otherwise.  She did not take any medicine prior to arrival to the ED today.  No known sick contacts.    The history is provided by the patient.     Review of patient's allergies indicates:   Allergen Reactions    Fish containing products Rash     Past Medical History:   Diagnosis Date    Asthma     Hypertension affecting pregnancy in third trimester 6/21/2021     History reviewed. No pertinent surgical history.  Family History   Problem Relation Age of Onset    Miscarriages / Stillbirths Mother     Hypertension Mother     Hypertension Maternal Grandmother     Diabetes Maternal Grandfather     Stroke Maternal Grandfather     Seizures Brother      Social History     Tobacco Use    Smoking status: Never Smoker    Smokeless tobacco: Never Used   Substance Use Topics    Alcohol use: No     Alcohol/week: 0.0 standard drinks    Drug use: No     Review of Systems   Constitutional: Negative for chills and fever.   HENT: Positive for congestion, ear pain, rhinorrhea and sore throat.    Eyes: Negative for visual disturbance.   Respiratory: Positive for cough. Negative for shortness of breath.    Cardiovascular: Negative for chest pain.   Gastrointestinal: Negative for nausea and vomiting.   Genitourinary: Negative for dysuria and flank pain.   Musculoskeletal: Negative for myalgias.   Skin: Negative for rash.   Allergic/Immunologic: Negative for immunocompromised state.   Neurological: Negative for weakness and  numbness.   Hematological: Does not bruise/bleed easily.   Psychiatric/Behavioral: Negative for confusion.       Physical Exam     Initial Vitals [03/21/22 1647]   BP Pulse Resp Temp SpO2   119/67 91 18 98.5 °F (36.9 °C) 100 %      MAP       --         Physical Exam    Vitals reviewed.  Constitutional: She appears well-developed and well-nourished. She is not diaphoretic. No distress.   HENT:   Head: Normocephalic and atraumatic.   Nose: Mucosal edema and rhinorrhea present.   Mouth/Throat: Oropharynx is clear and moist.   Eyes: Conjunctivae and EOM are normal.   Neck: Neck supple.   Cardiovascular: Normal rate, regular rhythm, normal heart sounds and intact distal pulses.   Pulmonary/Chest: Breath sounds normal.   Abdominal: Abdomen is soft. Bowel sounds are normal.   Musculoskeletal:      Cervical back: Neck supple.     Neurological: She is alert and oriented to person, place, and time.   Skin: Skin is warm.   Psychiatric: She has a normal mood and affect.         ED Course   Procedures  Labs Reviewed   SARS-COV-2 (COVID-19) QUALITATIVE PCR   POCT INFLUENZA A/B MOLECULAR   POCT URINE PREGNANCY          Imaging Results    None          Medications   ibuprofen tablet 600 mg (600 mg Oral Given 3/21/22 1800)           APC / Resident Notes:   Patient in the ER promptly upon arrival.  She distress.  Physical examination reveals clear rhinorrhea.  Heart and lung sounds normal. abdomen soft, nondistended, nontender.  Nontoxic appearing.  No evidence of hypoxia.  Patient is able speak in complete full sentences without difficulty.    Influenza test negative.  COVID test obtained, pending results.  Will prescribe patient home on naproxen and Tessalon Perles use as directed.  She was advised to stay hydrated at home.  Suspect viral etiology.  Advised patient to follow-up with my chart for COVID results.  Stable for discharge at this time.    Disclaimer: This note has been generated using voice-recognition software. There may  be typographical errors that have been missed during proof-reading.                   Clinical Impression:   Final diagnoses:  [J06.9] Viral URI with cough (Primary)          ED Disposition Condition    Discharge Stable        ED Prescriptions     Medication Sig Dispense Start Date End Date Auth. Provider    benzonatate (TESSALON) 100 MG capsule Take 2 capsules (200 mg total) by mouth 3 (three) times daily as needed for Cough. 20 capsule 3/21/2022 3/31/2022 Raquel Yung PA-C    naproxen (NAPROSYN) 500 MG tablet Take 1 tablet (500 mg total) by mouth 2 (two) times daily with meals. for 10 days 20 tablet 3/21/2022 3/31/2022 Raquel Yung PA-C        Follow-up Information     Follow up With Specialties Details Why Contact Info    Charles Hein MD Pediatrics   [address] [place] [address]  [id number]  [place] [id number]  [phone]             Raquel Yung PA-C  03/21/22 8994

## 2022-03-21 NOTE — Clinical Note
"Fiona Valdestammi" Rey was seen and treated in our emergency department on 3/21/2022.  She may return to work on 03/23/2022.       If you have any questions or concerns, please don't hesitate to call.      Raquel Yung PA-C"

## 2022-03-23 LAB
SARS-COV-2 RNA RESP QL NAA+PROBE: NOT DETECTED
SARS-COV-2- CYCLE NUMBER: NORMAL

## 2022-04-26 ENCOUNTER — HOSPITAL ENCOUNTER (EMERGENCY)
Facility: HOSPITAL | Age: 22
Discharge: HOME | End: 2022-04-26
Attending: EMERGENCY MEDICINE
Payer: MEDICAID

## 2022-04-26 VITALS
OXYGEN SATURATION: 99 % | RESPIRATION RATE: 18 BRPM | HEIGHT: 66 IN | DIASTOLIC BLOOD PRESSURE: 76 MMHG | HEART RATE: 88 BPM | TEMPERATURE: 99 F | SYSTOLIC BLOOD PRESSURE: 131 MMHG | WEIGHT: 173 LBS | BODY MASS INDEX: 27.8 KG/M2

## 2022-04-26 DIAGNOSIS — Z33.1 INCIDENTAL PREGNANCY: ICD-10-CM

## 2022-04-26 DIAGNOSIS — M25.572 LEFT ANKLE PAIN: Primary | ICD-10-CM

## 2022-04-26 LAB
B-HCG UR QL: POSITIVE
CTP QC/QA: YES

## 2022-04-26 PROCEDURE — 99283 EMERGENCY DEPT VISIT LOW MDM: CPT | Mod: 25

## 2022-04-26 PROCEDURE — 81025 URINE PREGNANCY TEST: CPT | Performed by: EMERGENCY MEDICINE

## 2022-04-26 NOTE — Clinical Note
"Fiona Valdestammi" Rey was seen and treated in our emergency department on 4/26/2022.  She may return to work on 04/28/2022.       If you have any questions or concerns, please don't hesitate to call.       LPN    "

## 2022-04-27 NOTE — ED PROVIDER NOTES
Encounter Date: 4/26/2022       History     Chief Complaint   Patient presents with    Ankle Pain     Presents with complaint pain and swelling left foot and ankle with twisting injury occurred today while playing football     21-year-old female with chief complaint left ankle pain/swelling related to injury while playing football prior to arrival.    Patient states she twisted her ankle, describes in ankle inversion injury while playing football.  She admits to inability to tolerate weight-bearing secondary to pain, swelling to lateral aspect of the ankle and foot.  Denies any previous injury or surgery.  No open wound.  No associated ipsilateral the pain or swelling.  Denies any head trauma or LOC.  No other wounds or complaints.  Symptoms are acute, constant mild.  No radiation of pain.  Pain is alleviated with rest/elevation.        Review of patient's allergies indicates:   Allergen Reactions    Fish containing products Rash     Past Medical History:   Diagnosis Date    Asthma     Hypertension affecting pregnancy in third trimester 6/21/2021     No past surgical history on file.  Family History   Problem Relation Age of Onset    Miscarriages / Stillbirths Mother     Hypertension Mother     Hypertension Maternal Grandmother     Diabetes Maternal Grandfather     Stroke Maternal Grandfather     Seizures Brother      Social History     Tobacco Use    Smoking status: Never Smoker    Smokeless tobacco: Never Used   Substance Use Topics    Alcohol use: Not Currently    Drug use: Yes     Types: Marijuana     Review of Systems   Musculoskeletal: Positive for arthralgias, gait problem and joint swelling.   Skin: Negative for color change and wound.       Physical Exam     Initial Vitals [04/26/22 2001]   BP Pulse Resp Temp SpO2   123/73 82 19 98.6 °F (37 °C) 99 %      MAP       --         Physical Exam    Nursing note and vitals reviewed.  Constitutional: She appears well-developed and well-nourished. She  is not diaphoretic. No distress.   Well-appearing nontoxic.  Sitting upright on exam table.  Unable to tolerate weight-bearing on left lower extremity secondary to discomfort.   HENT:   Head: Normocephalic and atraumatic.   Neck: Neck supple.   Cardiovascular: Intact distal pulses.   1+ DP bilaterally   Musculoskeletal:      Cervical back: Neck supple.      Comments: L ankle: ttp lateral malleolus, L proximal/lateral foot with associated swelling, ecchymosis, ttp. ttp 3rd, 4th, 5th metatarsal region dorsally, associated plantar ttp to 4th, 5th metatarsals.  Limited active range of motion of the ankle.  Pain with plantar flexion, inversion, eversion.  No Achilles tenderness or defect.  No open wound.  No bony deformity.  Two-second cap refill all toes.  No ipsilateral proximal fibular tenderness     Neurological: She is alert and oriented to person, place, and time. GCS score is 15. GCS eye subscore is 4. GCS verbal subscore is 5. GCS motor subscore is 6.   Skin: Capillary refill takes less than 2 seconds.   Psychiatric: She has a normal mood and affect. Thought content normal.         ED Course   Procedures  Labs Reviewed   POCT URINE PREGNANCY - Abnormal; Notable for the following components:       Result Value    POC Preg Test, Ur Positive (*)     All other components within normal limits          Imaging Results          X-Ray Foot Complete Left (Final result)  Result time 04/26/22 21:49:53    Final result by Bowen Talbert MD (04/26/22 21:49:53)                 Impression:      No acute bony abnormality.      Electronically signed by: Bowen Talbert MD  Date:    04/26/2022  Time:    21:49             Narrative:    EXAMINATION:  XR FOOT COMPLETE 3 VIEW LEFT; XR ANKLE COMPLETE 3 VIEW LEFT    CLINICAL HISTORY:  Pain in left ankle and joints of left foot.    TECHNIQUE:  Three views of the left foot and three views of the left ankle.    COMPARISON:  None.    FINDINGS:  Left ankle: No evidence of acute fracture  or dislocation.  Soft tissues are symmetric.  No unexpected radiopaque foreign body.    Left foot: No acute fracture, dislocation, or bony erosion. Soft tissues are symmetric.  No radiopaque foreign body.                               X-Ray Ankle Complete Left (Final result)  Result time 04/26/22 21:49:53    Final result by Bowen Talbert MD (04/26/22 21:49:53)                 Impression:      No acute bony abnormality.      Electronically signed by: Bowen Talbert MD  Date:    04/26/2022  Time:    21:49             Narrative:    EXAMINATION:  XR FOOT COMPLETE 3 VIEW LEFT; XR ANKLE COMPLETE 3 VIEW LEFT    CLINICAL HISTORY:  Pain in left ankle and joints of left foot.    TECHNIQUE:  Three views of the left foot and three views of the left ankle.    COMPARISON:  None.    FINDINGS:  Left ankle: No evidence of acute fracture or dislocation.  Soft tissues are symmetric.  No unexpected radiopaque foreign body.    Left foot: No acute fracture, dislocation, or bony erosion. Soft tissues are symmetric.  No radiopaque foreign body.                                 Medications - No data to display  Medical Decision Making:   Differential Diagnosis:   Fracture, contusion, sprain/strain  Clinical Tests:   Radiological Study: Ordered and Reviewed  ED Management:  Suspect ankle sprain.  There is no obvious 5th metatarsal fracture, avulsion injury, or convincing evidence of Vanegas fracture.  No acute fracture on imaging.  Neurovascularly intact.  Will place in air splint, advised weight-bearing as tolerated, continue with Tylenol as needed for discomfort, rice precautions, follow-up with ortho for any persistent symptoms.  Referral placed.  Return precautions given.                      Clinical Impression:   Final diagnoses:  [M25.572] Left ankle pain (Primary)  [Z33.1] Incidental pregnancy          ED Disposition Condition    Discharge Stable        ED Prescriptions     None        Follow-up Information     Follow up With  Specialties Details Why Contact Info    Favio Ferris MD Orthopedic Surgery Schedule an appointment as soon as possible for a visit  to schedule an appointment for followup with Orthopedics, For reevaluation [address] [facility] [address]  [facility] [address]  [phone]      [facility]  Schedule an appointment as soon as possible for a visit  For reevaluation [address] [place] [address]    [phone]           Nils Onofre PA-C  04/27/22 0417

## 2022-04-27 NOTE — DISCHARGE INSTRUCTIONS
Continue with Tylenol as needed for pain. Continue with rest, ice, compression, elevation (RICE) to help with swelling and discomfort.  Weight-bearing as tolerated.    If any persistent pain, follow-up with orthopedics using information provided.    Return to this emergency department if worsening pain despite treatment, if joint becomes red and warm, if unable to walk or bear weight within the next 4-5 days, if any other problems occur.

## 2022-05-04 ENCOUNTER — OFFICE VISIT (OUTPATIENT)
Dept: OBSTETRICS AND GYNECOLOGY | Facility: CLINIC | Age: 22
End: 2022-05-04
Payer: MEDICAID

## 2022-05-04 VITALS
BODY MASS INDEX: 26.5 KG/M2 | HEIGHT: 66 IN | SYSTOLIC BLOOD PRESSURE: 120 MMHG | WEIGHT: 164.88 LBS | DIASTOLIC BLOOD PRESSURE: 72 MMHG

## 2022-05-04 DIAGNOSIS — O09.891 HISTORY OF PRETERM DELIVERY, CURRENTLY PREGNANT IN FIRST TRIMESTER: ICD-10-CM

## 2022-05-04 DIAGNOSIS — O21.9 NAUSEA AND VOMITING IN PREGNANCY: ICD-10-CM

## 2022-05-04 DIAGNOSIS — Z3A.01 6 WEEKS GESTATION OF PREGNANCY: Primary | ICD-10-CM

## 2022-05-04 DIAGNOSIS — Z87.59 HISTORY OF PREGNANCY INDUCED HYPERTENSION: ICD-10-CM

## 2022-05-04 PROBLEM — O16.3 HYPERTENSION AFFECTING PREGNANCY IN THIRD TRIMESTER: Status: RESOLVED | Noted: 2021-06-21 | Resolved: 2022-05-04

## 2022-05-04 PROBLEM — R10.9 ABDOMINAL PAIN: Status: RESOLVED | Noted: 2021-04-29 | Resolved: 2022-05-04

## 2022-05-04 PROCEDURE — 87491 CHLMYD TRACH DNA AMP PROBE: CPT | Performed by: OBSTETRICS & GYNECOLOGY

## 2022-05-04 PROCEDURE — 3078F DIAST BP <80 MM HG: CPT | Mod: CPTII,,, | Performed by: OBSTETRICS & GYNECOLOGY

## 2022-05-04 PROCEDURE — 99213 OFFICE O/P EST LOW 20 MIN: CPT | Mod: PBBFAC,TH | Performed by: OBSTETRICS & GYNECOLOGY

## 2022-05-04 PROCEDURE — 3074F SYST BP LT 130 MM HG: CPT | Mod: CPTII,,, | Performed by: OBSTETRICS & GYNECOLOGY

## 2022-05-04 PROCEDURE — 99203 OFFICE O/P NEW LOW 30 MIN: CPT | Mod: S$PBB,TH,, | Performed by: OBSTETRICS & GYNECOLOGY

## 2022-05-04 PROCEDURE — 1159F MED LIST DOCD IN RCRD: CPT | Mod: CPTII,,, | Performed by: OBSTETRICS & GYNECOLOGY

## 2022-05-04 PROCEDURE — 1159F PR MEDICATION LIST DOCUMENTED IN MEDICAL RECORD: ICD-10-PCS | Mod: CPTII,,, | Performed by: OBSTETRICS & GYNECOLOGY

## 2022-05-04 PROCEDURE — 3074F PR MOST RECENT SYSTOLIC BLOOD PRESSURE < 130 MM HG: ICD-10-PCS | Mod: CPTII,,, | Performed by: OBSTETRICS & GYNECOLOGY

## 2022-05-04 PROCEDURE — 1160F RVW MEDS BY RX/DR IN RCRD: CPT | Mod: CPTII,,, | Performed by: OBSTETRICS & GYNECOLOGY

## 2022-05-04 PROCEDURE — 3078F PR MOST RECENT DIASTOLIC BLOOD PRESSURE < 80 MM HG: ICD-10-PCS | Mod: CPTII,,, | Performed by: OBSTETRICS & GYNECOLOGY

## 2022-05-04 PROCEDURE — 99999 PR PBB SHADOW E&M-EST. PATIENT-LVL III: ICD-10-PCS | Mod: PBBFAC,,, | Performed by: OBSTETRICS & GYNECOLOGY

## 2022-05-04 PROCEDURE — 3008F BODY MASS INDEX DOCD: CPT | Mod: CPTII,,, | Performed by: OBSTETRICS & GYNECOLOGY

## 2022-05-04 PROCEDURE — 99203 PR OFFICE/OUTPT VISIT, NEW, LEVL III, 30-44 MIN: ICD-10-PCS | Mod: S$PBB,TH,, | Performed by: OBSTETRICS & GYNECOLOGY

## 2022-05-04 PROCEDURE — 87591 N.GONORRHOEAE DNA AMP PROB: CPT | Mod: 59 | Performed by: OBSTETRICS & GYNECOLOGY

## 2022-05-04 PROCEDURE — 87481 CANDIDA DNA AMP PROBE: CPT | Mod: 59 | Performed by: OBSTETRICS & GYNECOLOGY

## 2022-05-04 PROCEDURE — 99999 PR PBB SHADOW E&M-EST. PATIENT-LVL III: CPT | Mod: PBBFAC,,, | Performed by: OBSTETRICS & GYNECOLOGY

## 2022-05-04 PROCEDURE — 1160F PR REVIEW ALL MEDS BY PRESCRIBER/CLIN PHARMACIST DOCUMENTED: ICD-10-PCS | Mod: CPTII,,, | Performed by: OBSTETRICS & GYNECOLOGY

## 2022-05-04 PROCEDURE — 3008F PR BODY MASS INDEX (BMI) DOCUMENTED: ICD-10-PCS | Mod: CPTII,,, | Performed by: OBSTETRICS & GYNECOLOGY

## 2022-05-04 RX ORDER — PRENATAL SUPPLEMENT WITH DHA 1100; 30; 1.6; 1.8; 15; 2.5; .012; 1; .15; 20; 25; 2; 1000; 200; 20; 29 [IU]/1; MG/1; MG/1; MG/1; MG/1; MG/1; MG/1; MG/1; MG/1; MG/1; MG/1; MG/1; [IU]/1; MG/1; [IU]/1; MG/1
1 CAPSULE, GELATIN COATED ORAL DAILY
Qty: 30 CAPSULE | Refills: 6 | Status: SHIPPED | OUTPATIENT
Start: 2022-05-04 | End: 2022-06-15 | Stop reason: SDUPTHER

## 2022-05-04 RX ORDER — PROMETHAZINE HYDROCHLORIDE 25 MG/1
25 TABLET ORAL EVERY 6 HOURS PRN
Qty: 30 TABLET | Refills: 1 | Status: ON HOLD | OUTPATIENT
Start: 2022-05-04 | End: 2022-11-27

## 2022-05-04 NOTE — PROGRESS NOTES
Subjective:       Patient ID: Fiona Le is a 21 y.o. female.    Chief Complaint:  Confirmation (UPT- Positive  LMP: 2022  CARR[place]: 2022 EGA: 6w 1d)      History of Present Illness  HPI  Patient comes in today to begin care  Patient has a positive urine pregnancy test on 2022 when she went to the ED for left ankle sprain while playing football.  Pregnancy is desired. Sexual Activity: single partner, contraception: none. Current symptoms also include: breast tenderness, fatigue, frequent urination, nausea and positive pregnancy test. Last period was normal.     Patient's last menstrual period was 3/22/2022 (exact date).     By date, she should be about 6w1d with EDC on 2022    History of PIH and  birth at 34 weeks.      GYN & OB History  Patient's last menstrual period was 2022 (exact date).   Date of Last Pap: 2021    OB History    Para Term  AB Living   3 2 1 1   2   SAB IAB Ectopic Multiple Live Births         0 2      # Outcome Date GA Lbr Nahid/2nd Weight Sex Delivery Anes PTL Lv   3 Current            2  21 34w1d 01:18 / 00:04 1.82 kg (4 lb 0.2 oz) F Vag-Spont EPI Y DOUG   1 Term 16 38w5d  3.738 kg (8 lb 3.9 oz) M Vag-Vacuum EPI N DOUG     Past Medical History:   Diagnosis Date    Asthma     Hypertension affecting pregnancy in third trimester 2021       History reviewed. No pertinent surgical history.    Family History   Problem Relation Age of Onset    Miscarriages / Stillbirths Mother     Hypertension Mother     Hypertension Maternal Grandmother     Diabetes Maternal Grandfather     Stroke Maternal Grandfather     Seizures Brother        Social History     Socioeconomic History    Marital status: Single   Tobacco Use    Smoking status: Never Smoker    Smokeless tobacco: Never Used   Substance and Sexual Activity    Alcohol use: Not Currently    Drug use: Yes     Types: Marijuana    Sexual activity: Yes     Partners:  Male     Birth control/protection: None   Social History Narrative    Together since 5/24/2014    Both graduated from [employer]    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     No current facility-administered medications for this visit.       Review of patient's allergies indicates:   Allergen Reactions    Fish containing products Rash       Review of Systems  Review of Systems   Constitutional: Positive for fatigue. Negative for activity change, appetite change, fever and unexpected weight change.   Respiratory: Negative for cough, shortness of breath and wheezing.    Cardiovascular: Negative for chest pain and palpitations.   Gastrointestinal: Positive for abdominal pain and nausea. Negative for vomiting.   Endocrine: Negative for hot flashes.   Genitourinary: Positive for frequency, pelvic pain and vaginal discharge. Negative for dysmenorrhea, dyspareunia, urgency, vaginal bleeding and postcoital bleeding.   Musculoskeletal: Positive for back pain. Negative for myalgias.   Integumentary:  Negative for rash, breast mass and nipple discharge.   Neurological: Positive for headaches. Negative for seizures.   Psychiatric/Behavioral: Negative for depression and sleep disturbance. The patient is not nervous/anxious.    Breast: Negative for mass, mastodynia and nipple discharge          Objective:    Physical Exam:   Constitutional: She appears well-developed and well-nourished. No distress.   BMI of 26.6    HENT:   Head: Normocephalic and atraumatic.    Eyes: EOM are normal.      Pulmonary/Chest: Effort normal. No respiratory distress.   Breasts: Non-tender, no engorgement, no masses, no retraction, no discharge. Negative for lymphadenopathy.         Abdominal: Soft. She exhibits no distension. There is no abdominal tenderness. There is no rebound and no guarding.     Genitourinary:    Vagina and uterus normal.   No  no vaginal discharge in the  vagina.    Genitourinary Comments: Vulva without any obvious lesions.  Urethral meatus normal size and location without any lesion.  Urethra is non-tender without stricture or discharge.  Bladder is non-tender.  Vaginal vault with good support.  Minimal white discharge noted.  No obvious lesion.  Normal rugation.  Cervix is without any cervical motion tenderness.  No obvious lesion.  Uterus is small, non-tender, normal contour.  Adnexa is without any masses or tenderness.  Perineum without obvious lesion.               Musculoskeletal: Normal range of motion.       Neurological: She is alert.    Skin: Skin is warm and dry.    Psychiatric: She has a normal mood and affect.          Assessment:        1. 6 weeks gestation of pregnancy    2. History of pregnancy induced hypertension    3. Nausea and vomiting in pregnancy    4.  History of  delivery at 34 weeks         Plan:      I have discussed with the patient her condition  She is doing well so far in her early pregnancy  She is not taking over-the-counter prenatal vitamins; Prescription given  Gonorrhea and chlamydia performed  We will contact her insurance for maternity benefits    Phenergan given for nausea   She will be back in about 2-4 weeks for follow-up    We discussed history of PIH.  Would start baby aspirin at 12 weeks.  CMP and P/C with prenatal profile.  Watch for signs of PIH.    We discussed history of  birth.  Would initiate Dora weekly at 16 weeks.

## 2022-05-07 LAB
C TRACH DNA SPEC QL NAA+PROBE: NOT DETECTED
N GONORRHOEA DNA SPEC QL NAA+PROBE: NOT DETECTED

## 2022-05-08 ENCOUNTER — HOSPITAL ENCOUNTER (EMERGENCY)
Facility: HOSPITAL | Age: 22
Discharge: HOME | End: 2022-05-08
Attending: EMERGENCY MEDICINE
Payer: MEDICAID

## 2022-05-08 VITALS
OXYGEN SATURATION: 100 % | DIASTOLIC BLOOD PRESSURE: 72 MMHG | HEART RATE: 75 BPM | WEIGHT: 173 LBS | RESPIRATION RATE: 16 BRPM | BODY MASS INDEX: 27.8 KG/M2 | SYSTOLIC BLOOD PRESSURE: 113 MMHG | TEMPERATURE: 99 F | HEIGHT: 66 IN

## 2022-05-08 DIAGNOSIS — Z34.90 INTRAUTERINE PREGNANCY: Primary | ICD-10-CM

## 2022-05-08 DIAGNOSIS — O46.8X1 SUBCHORIONIC HEMORRHAGE OF PLACENTA IN FIRST TRIMESTER, SINGLE OR UNSPECIFIED FETUS: ICD-10-CM

## 2022-05-08 DIAGNOSIS — N76.0 BACTERIAL VAGINOSIS: ICD-10-CM

## 2022-05-08 DIAGNOSIS — B96.89 BACTERIAL VAGINOSIS: ICD-10-CM

## 2022-05-08 DIAGNOSIS — O41.8X10 SUBCHORIONIC HEMORRHAGE OF PLACENTA IN FIRST TRIMESTER, SINGLE OR UNSPECIFIED FETUS: ICD-10-CM

## 2022-05-08 DIAGNOSIS — R10.30 LOWER ABDOMINAL PAIN: ICD-10-CM

## 2022-05-08 DIAGNOSIS — N89.8 VAGINAL DISCHARGE: ICD-10-CM

## 2022-05-08 LAB
ABO + RH BLD: NORMAL
ALBUMIN SERPL BCP-MCNC: 4 G/DL (ref 3.5–5.2)
ALP SERPL-CCNC: 59 U/L (ref 55–135)
ALT SERPL W/O P-5'-P-CCNC: 17 U/L (ref 10–44)
ANION GAP SERPL CALC-SCNC: 8 MMOL/L (ref 8–16)
AST SERPL-CCNC: 16 U/L (ref 10–40)
BACTERIA #/AREA URNS AUTO: NORMAL /HPF
BACTERIA GENITAL QL WET PREP: ABNORMAL
BASOPHILS # BLD AUTO: 0.02 K/UL (ref 0–0.2)
BASOPHILS NFR BLD: 0.3 % (ref 0–1.9)
BILIRUB SERPL-MCNC: 0.8 MG/DL (ref 0.1–1)
BILIRUB UR QL STRIP: NEGATIVE
BUN SERPL-MCNC: 8 MG/DL (ref 6–20)
CALCIUM SERPL-MCNC: 9.7 MG/DL (ref 8.7–10.5)
CHLORIDE SERPL-SCNC: 101 MMOL/L (ref 95–110)
CLARITY UR REFRACT.AUTO: ABNORMAL
CLUE CELLS VAG QL WET PREP: ABNORMAL
CO2 SERPL-SCNC: 24 MMOL/L (ref 23–29)
COLOR UR AUTO: YELLOW
CREAT SERPL-MCNC: 0.7 MG/DL (ref 0.5–1.4)
DIFFERENTIAL METHOD: ABNORMAL
EOSINOPHIL # BLD AUTO: 0 K/UL (ref 0–0.5)
EOSINOPHIL NFR BLD: 0.6 % (ref 0–8)
ERYTHROCYTE [DISTWIDTH] IN BLOOD BY AUTOMATED COUNT: 13.3 % (ref 11.5–14.5)
EST. GFR  (AFRICAN AMERICAN): >60 ML/MIN/1.73 M^2
EST. GFR  (NON AFRICAN AMERICAN): >60 ML/MIN/1.73 M^2
FILAMENT FUNGI VAG WET PREP-#/AREA: ABNORMAL
GLUCOSE SERPL-MCNC: 75 MG/DL (ref 70–110)
GLUCOSE UR QL STRIP: NEGATIVE
HCG INTACT+B SERPL-ACNC: NORMAL MIU/ML
HCT VFR BLD AUTO: 37.7 % (ref 37–48.5)
HGB BLD-MCNC: 11.9 G/DL (ref 12–16)
HGB UR QL STRIP: NEGATIVE
HYALINE CASTS UR QL AUTO: 0 /LPF
IMM GRANULOCYTES # BLD AUTO: 0.02 K/UL (ref 0–0.04)
IMM GRANULOCYTES NFR BLD AUTO: 0.3 % (ref 0–0.5)
KETONES UR QL STRIP: ABNORMAL
LEUKOCYTE ESTERASE UR QL STRIP: NEGATIVE
LYMPHOCYTES # BLD AUTO: 3 K/UL (ref 1–4.8)
LYMPHOCYTES NFR BLD: 42.7 % (ref 18–48)
MCH RBC QN AUTO: 27.5 PG (ref 27–31)
MCHC RBC AUTO-ENTMCNC: 31.6 G/DL (ref 32–36)
MCV RBC AUTO: 87 FL (ref 82–98)
MICROSCOPIC COMMENT: NORMAL
MONOCYTES # BLD AUTO: 0.5 K/UL (ref 0.3–1)
MONOCYTES NFR BLD: 6.8 % (ref 4–15)
NEUTROPHILS # BLD AUTO: 3.5 K/UL (ref 1.8–7.7)
NEUTROPHILS NFR BLD: 49.3 % (ref 38–73)
NITRITE UR QL STRIP: NEGATIVE
NRBC BLD-RTO: 0 /100 WBC
PH UR STRIP: 5 [PH] (ref 5–8)
PLATELET # BLD AUTO: 296 K/UL (ref 150–450)
PMV BLD AUTO: 10 FL (ref 9.2–12.9)
POTASSIUM SERPL-SCNC: 3.7 MMOL/L (ref 3.5–5.1)
PROT SERPL-MCNC: 7.9 G/DL (ref 6–8.4)
PROT UR QL STRIP: ABNORMAL
RBC # BLD AUTO: 4.33 M/UL (ref 4–5.4)
RBC #/AREA URNS AUTO: 2 /HPF (ref 0–4)
SODIUM SERPL-SCNC: 133 MMOL/L (ref 136–145)
SP GR UR STRIP: 1.03 (ref 1–1.03)
SPECIMEN SOURCE: ABNORMAL
SQUAMOUS #/AREA URNS AUTO: 18 /HPF
T VAGINALIS GENITAL QL WET PREP: ABNORMAL
URN SPEC COLLECT METH UR: ABNORMAL
WBC # BLD AUTO: 7.09 K/UL (ref 3.9–12.7)
WBC #/AREA URNS AUTO: 3 /HPF (ref 0–5)
WBC #/AREA VAG WET PREP: ABNORMAL
YEAST GENITAL QL WET PREP: ABNORMAL

## 2022-05-08 PROCEDURE — 99284 EMERGENCY DEPT VISIT MOD MDM: CPT | Mod: ,,, | Performed by: EMERGENCY MEDICINE

## 2022-05-08 PROCEDURE — 87210 SMEAR WET MOUNT SALINE/INK: CPT | Performed by: EMERGENCY MEDICINE

## 2022-05-08 PROCEDURE — 87591 N.GONORRHOEAE DNA AMP PROB: CPT | Performed by: EMERGENCY MEDICINE

## 2022-05-08 PROCEDURE — 87389 HIV-1 AG W/HIV-1&-2 AB AG IA: CPT | Performed by: EMERGENCY MEDICINE

## 2022-05-08 PROCEDURE — 86803 HEPATITIS C AB TEST: CPT | Performed by: EMERGENCY MEDICINE

## 2022-05-08 PROCEDURE — 99284 EMERGENCY DEPT VISIT MOD MDM: CPT | Mod: 25

## 2022-05-08 PROCEDURE — 81001 URINALYSIS AUTO W/SCOPE: CPT | Performed by: EMERGENCY MEDICINE

## 2022-05-08 PROCEDURE — 99284 PR EMERGENCY DEPT VISIT,LEVEL IV: ICD-10-PCS | Mod: ,,, | Performed by: EMERGENCY MEDICINE

## 2022-05-08 PROCEDURE — 86901 BLOOD TYPING SEROLOGIC RH(D): CPT | Performed by: EMERGENCY MEDICINE

## 2022-05-08 PROCEDURE — 80053 COMPREHEN METABOLIC PANEL: CPT | Performed by: EMERGENCY MEDICINE

## 2022-05-08 PROCEDURE — 84702 CHORIONIC GONADOTROPIN TEST: CPT | Performed by: EMERGENCY MEDICINE

## 2022-05-08 PROCEDURE — 87491 CHLMYD TRACH DNA AMP PROBE: CPT | Performed by: EMERGENCY MEDICINE

## 2022-05-08 PROCEDURE — 85025 COMPLETE CBC W/AUTO DIFF WBC: CPT | Performed by: EMERGENCY MEDICINE

## 2022-05-08 RX ORDER — METRONIDAZOLE 500 MG/1
500 TABLET ORAL EVERY 12 HOURS
Qty: 14 TABLET | Refills: 0 | Status: SHIPPED | OUTPATIENT
Start: 2022-05-08 | End: 2022-05-12

## 2022-05-08 NOTE — ED PROVIDER NOTES
Encounter Date: 2022    SCRIBE #1 NOTE: I, Pancho Funez, am scribing for, and in the presence of,  Giovanny Reid MD. I have scribed the following portions of the note - Other sections scribed: hpiricci.     STAFF ATTENDING PHYSICIAN NOTE:  I provided and agree with the documentation provided by ALLEGRA on Fiona Le.  ____________________  Mateo ARSEN Reid MD, [facility]  Emergency Medicine Staff  3:42 PM 2022      History     Chief Complaint   Patient presents with    Abdominal Pain     Cramping , no bleeding 6 weeks pregnant     21 y.o. female  presents with a chief complaint of sharp cramping abdominal pain radiating to her back onset this AM. Severity is 8/10. She notes she is 6 weeks pregnant. , both vaginally delivered. Her last LMP was . She denies any sexual activity in the past 2 days. She experiences white vaginal discharge. Denies any abdominal surgeries. She denies any Genitourinary procedures or miscarriages. Patient reports no other identifying, alleviating, or exacerbating factors and denies vaginal bleeding, dysuria, fever, nausea, vomiting, urine frequency, black stools, or any other associated symptoms at this time.            The history is provided by the patient and medical records. No  was used.     Review of patient's allergies indicates:   Allergen Reactions    Fish containing products Rash     Past Medical History:   Diagnosis Date    Asthma     Hypertension affecting pregnancy in third trimester 2021     No past surgical history on file.  Family History   Problem Relation Age of Onset    Miscarriages / Stillbirths Mother     Hypertension Mother     Hypertension Maternal Grandmother     Diabetes Maternal Grandfather     Stroke Maternal Grandfather     Seizures Brother      Social History     Tobacco Use    Smoking status: Never Smoker    Smokeless tobacco: Never Used   Substance Use Topics    Alcohol use: Not Currently    Drug use:  Yes     Types: Marijuana     Review of Systems   Constitutional: Negative for fever.   HENT: Negative for sore throat.    Respiratory: Negative for shortness of breath.    Cardiovascular: Negative for chest pain.   Gastrointestinal: Positive for abdominal pain. Negative for nausea and vomiting.   Genitourinary: Positive for vaginal discharge. Negative for dysuria, frequency and vaginal bleeding.   Musculoskeletal: Positive for back pain.   Skin: Negative for rash.   Neurological: Negative for weakness.   Hematological: Does not bruise/bleed easily.       Physical Exam     Initial Vitals [05/08/22 1339]   BP Pulse Resp Temp SpO2   120/70 82 16 98.6 °F (37 °C) 100 %      MAP       --         Physical Exam    Nursing note and vitals reviewed.  Constitutional: She appears well-developed and well-nourished.   HENT:   Head: Normocephalic and atraumatic.   Eyes: Conjunctivae and EOM are normal. Pupils are equal, round, and reactive to light.   Neck: Neck supple.   Normal range of motion.  Cardiovascular: Normal rate and regular rhythm.   Pulmonary/Chest: Breath sounds normal. No respiratory distress.   Abdominal: Abdomen is soft. Bowel sounds are normal. She exhibits no distension.   Genitourinary:    Vaginal discharge present.      Genitourinary Comments: Pelvic exam noted for no external lesions.      Musculoskeletal:         General: Normal range of motion.      Cervical back: Normal range of motion and neck supple.      Comments: Back is nontender to palpation.      Neurological: She is alert and oriented to person, place, and time. She has normal strength.   Skin: Skin is warm and dry. Capillary refill takes less than 2 seconds. No rash noted.   Psychiatric: She has a normal mood and affect. Thought content normal.         ED Course   Procedures  Labs Reviewed   VAGINAL SCREEN - Abnormal; Notable for the following components:       Result Value    Clue Cells Few (*)     WBC - Vaginal Screen Few (*)     Bacteria -  Vaginal Screen Few (*)     All other components within normal limits    Narrative:     Release to patient->Immediate   CBC W/ AUTO DIFFERENTIAL - Abnormal; Notable for the following components:    Hemoglobin 11.9 (*)     MCHC 31.6 (*)     All other components within normal limits    Narrative:     Release to patient->Immediate   COMPREHENSIVE METABOLIC PANEL - Abnormal; Notable for the following components:    Sodium 133 (*)     All other components within normal limits    Narrative:     Release to patient->Immediate   URINALYSIS, REFLEX TO URINE CULTURE - Abnormal; Notable for the following components:    Appearance, UA Hazy (*)     Protein, UA 1+ (*)     Ketones, UA Trace (*)     All other components within normal limits    Narrative:     Specimen Source->Urine   C. TRACHOMATIS/N. GONORRHOEAE BY AMP DNA   HCG, QUANTITATIVE    Narrative:     Release to patient->Immediate   URINALYSIS MICROSCOPIC    Narrative:     Specimen Source->Urine   HIV 1 / 2 ANTIBODY   HEPATITIS C ANTIBODY   GROUP & RH                  Imaging Results          US OB <14 Wks TransAbd & TransVag, Single Gestation (XPD) (In process)  Result time 22 16:48:11                 Medications - No data to display  Medical Decision Making:   History:   Old Medical Records: I decided to obtain old medical records.  Initial Assessment:   Afebrile, atraumatic and hemodynamically stable  6 weeks gestation per LMP female presents with lower abdominal crampy pain questionable vaginal bleeding and foul-smelling discharge from vaginal region.  She denies any fever chills.  She denies high risk sexual encounters.  Despite lower crampy abdominal pain she assures passing gas, having non-black bowel movements and flatulence.  She denies sexual intercourse in the last 48 hours.  Transabdominal ultrasound at bedside confirm intrauterine pregnancy and pelvic exam revealed white frothy fish odorous discharge consistent with bacterial vaginosis.  Will send  labs, and treat accordingly.  ____________________  Mateo Reid MD, [facility]  Emergency Medicine Staff  3:44 PM 5/8/2022    F/U:  She reports feeling a lot better.  Her way to ultrasound a few minutes ago.  ____________________  Mateo Reid MD, [facility]  Emergency Medicine Staff  4:52 PM 5/8/2022      Clinical Tests:   Lab Tests: Reviewed and Ordered  Radiological Study: Reviewed and Ordered          Scribe Attestation:   Scribe #1: I performed the above scribed service and the documentation accurately describes the services I performed. I attest to the accuracy of the note.                 Clinical Impression:   Final diagnoses:  [Z34.90] Intrauterine pregnancy (Primary)  [R10.30] Lower abdominal pain  [N89.8] Vaginal discharge  [N76.0, B96.89] Bacterial vaginosis       F/U: Dr White will F/U TVUS and disposition home with BV Tx.  ____________________  Mateo Reid MD, [facility]  Emergency Medicine Staff  5:26 PM 5/8/2022            Giovanny Reid MD  05/08/22 2505

## 2022-05-08 NOTE — PROVIDER PROGRESS NOTES - EMERGENCY DEPT.
Encounter Date: 5/8/2022    ED Physician Progress Notes        Physician Note:   S/o to me.  Here with crampy abdominal pain and abnormal vaginal discharge.  No vaginal bleeding.  She is approximately 6 weeks pregnant.  Pending ultrasound to confirm IUP.  Labs unremarkable, urine as clue cells.  Plan is ultrasound shows IUP to discharge with Flagyl for BV, outpatient follow-up return precautions.    Update:  Ultrasound results:  Single live intrauterine pregnancy with estimated gestational age 6 weeks 3 days. and an CARRINGTON of 12/29/2022.   Small subchronic hemorrhage.     Patient continues to be hemodynamically stable and well-appearing in the emergency department.  Repeat abdominal exam continues to be entirely benign.  Patient informed of findings including subchorionic hemorrhage.  Outpatient follow-up with her OBGYN, ED return precautions vaginal bleeding, worsening or uncontrolled abdominal pain, weakness, dizziness, or any other new, worsening worrisome symptoms.  She was discharged with script for Flagyl for possible BV infection.    Findings of ED work up explained to patient. Patient agrees with discharge plan and verbalizes understanding of return precautions.

## 2022-05-08 NOTE — DISCHARGE INSTRUCTIONS
Your workup showed no immediately dangerous medical conditions at this time.    There was possibly is bacterial infection in the vagina which she will be treated for with antibiotics.    There is also small amount of bleeding near the uterus which there is nothing to do about at this time and just needs to be monitored.  Please follow-up next week with your OBGYN.    You can take Tylenol in over-the-counter doses as needed for pain.    If you develop any significant vaginal bleeding, dizziness, uncontrolled pain, vomiting, passing clots, or any other new, worsening worrisome symptoms please return to the emergency department for re-evaluation.

## 2022-05-08 NOTE — ED NOTES
Abd cramping starting today. Post partum 6 weeks. Denies vaginal bleeding. Denies bladder symptoms. [place] white discharge. States pain 8/10 at this time, radiates to RAY back     Patient identifiers for Fiona Le 21 y.o. female checked and correct.  Chief Complaint   Patient presents with    Abdominal Pain     Cramping , no bleeding 6 weeks pregnant     Past Medical History:   Diagnosis Date    Asthma     Hypertension affecting pregnancy in third trimester 6/21/2021     Allergies reported:   Review of patient's allergies indicates:   Allergen Reactions    Fish containing products Rash       Appearance: Pt awake, alert & oriented to person, place & time. Pt in no acute distress at present time. Pt is clean and well groomed with clothes appropriately fastened.   Skin: Skin warm, dry & intact. Color consistent with ethnicity. Mucous membranes moist. No breakdown or brusing noted.   Musculoskeletal: Patient moving all extremities well, no obvious swelling or deformities noted.   Respiratory: Respirations spontaneous, even, and non-labored. Visible chest rise noted. Airway is open and patent. No accessory muscle use noted.   Neurologic: Sensation is intact. Speech is clear and appropriate. Eyes open spontaneously, behavior appropriate to situation, follows commands, facial expression symmetrical, bilateral hand grasp equal and even, purposeful motor response noted.  Cardiac: All peripheral pulses present. No Bilateral lower extremity edema. Cap refill is <3 seconds.  Abdomen: Abdomen soft, non distended, non tender to palpation.   : Pt reports no dysuria or hematuria.

## 2022-05-10 LAB
C TRACH DNA SPEC QL NAA+PROBE: NOT DETECTED
HCV AB SERPL QL IA: NEGATIVE
HIV 1+2 AB+HIV1 P24 AG SERPL QL IA: NEGATIVE
N GONORRHOEA DNA SPEC QL NAA+PROBE: NOT DETECTED

## 2022-05-12 ENCOUNTER — PATIENT MESSAGE (OUTPATIENT)
Dept: OBSTETRICS AND GYNECOLOGY | Facility: CLINIC | Age: 22
End: 2022-05-12
Payer: MEDICAID

## 2022-05-12 DIAGNOSIS — B37.31 CANDIDA VAGINITIS: ICD-10-CM

## 2022-05-12 DIAGNOSIS — B96.89 BACTERIAL VAGINOSIS: Primary | ICD-10-CM

## 2022-05-12 DIAGNOSIS — N76.0 BACTERIAL VAGINOSIS: Primary | ICD-10-CM

## 2022-05-12 LAB
BACTERIAL VAGINOSIS DNA: POSITIVE
CANDIDA GLABRATA DNA: NEGATIVE
CANDIDA KRUSEI DNA: NEGATIVE
CANDIDA RRNA VAG QL PROBE: POSITIVE
T VAGINALIS RRNA GENITAL QL PROBE: NEGATIVE

## 2022-05-12 RX ORDER — CLINDAMYCIN HYDROCHLORIDE 300 MG/1
300 CAPSULE ORAL 2 TIMES DAILY
Qty: 14 CAPSULE | Refills: 0 | Status: SHIPPED | OUTPATIENT
Start: 2022-05-12 | End: 2022-05-18 | Stop reason: SDUPTHER

## 2022-05-12 RX ORDER — TERCONAZOLE 4 MG/G
1 CREAM VAGINAL NIGHTLY
Qty: 45 G | Refills: 0 | Status: SHIPPED | OUTPATIENT
Start: 2022-05-12 | End: 2022-05-18 | Stop reason: SDUPTHER

## 2022-05-18 ENCOUNTER — INITIAL PRENATAL (OUTPATIENT)
Dept: OBSTETRICS AND GYNECOLOGY | Facility: CLINIC | Age: 22
End: 2022-05-18
Payer: MEDICAID

## 2022-05-18 ENCOUNTER — LAB VISIT (OUTPATIENT)
Dept: LAB | Facility: HOSPITAL | Age: 22
End: 2022-05-18
Attending: PEDIATRICS
Payer: MEDICAID

## 2022-05-18 VITALS
WEIGHT: 160.94 LBS | DIASTOLIC BLOOD PRESSURE: 64 MMHG | BODY MASS INDEX: 25.98 KG/M2 | SYSTOLIC BLOOD PRESSURE: 110 MMHG

## 2022-05-18 DIAGNOSIS — Z87.59 HISTORY OF PREGNANCY INDUCED HYPERTENSION: ICD-10-CM

## 2022-05-18 DIAGNOSIS — B37.31 CANDIDA VAGINITIS: ICD-10-CM

## 2022-05-18 DIAGNOSIS — Z3A.08 8 WEEKS GESTATION OF PREGNANCY: ICD-10-CM

## 2022-05-18 DIAGNOSIS — B96.89 BACTERIAL VAGINOSIS: ICD-10-CM

## 2022-05-18 DIAGNOSIS — O09.891 HISTORY OF PRETERM DELIVERY, CURRENTLY PREGNANT IN FIRST TRIMESTER: ICD-10-CM

## 2022-05-18 DIAGNOSIS — N76.0 BACTERIAL VAGINOSIS: ICD-10-CM

## 2022-05-18 DIAGNOSIS — Z3A.08 8 WEEKS GESTATION OF PREGNANCY: Primary | ICD-10-CM

## 2022-05-18 LAB
ABO + RH BLD: NORMAL
ALBUMIN SERPL BCP-MCNC: 3.8 G/DL (ref 3.5–5.2)
ALP SERPL-CCNC: 51 U/L (ref 55–135)
ALT SERPL W/O P-5'-P-CCNC: 15 U/L (ref 10–44)
ANION GAP SERPL CALC-SCNC: 8 MMOL/L (ref 8–16)
AST SERPL-CCNC: 15 U/L (ref 10–40)
BASOPHILS # BLD AUTO: 0.02 K/UL (ref 0–0.2)
BASOPHILS NFR BLD: 0.3 % (ref 0–1.9)
BILIRUB SERPL-MCNC: 0.6 MG/DL (ref 0.1–1)
BLD GP AB SCN CELLS X3 SERPL QL: NORMAL
BUN SERPL-MCNC: 6 MG/DL (ref 6–20)
CALCIUM SERPL-MCNC: 9.4 MG/DL (ref 8.7–10.5)
CHLORIDE SERPL-SCNC: 105 MMOL/L (ref 95–110)
CO2 SERPL-SCNC: 24 MMOL/L (ref 23–29)
CREAT SERPL-MCNC: 0.7 MG/DL (ref 0.5–1.4)
DIFFERENTIAL METHOD: ABNORMAL
EOSINOPHIL # BLD AUTO: 0 K/UL (ref 0–0.5)
EOSINOPHIL NFR BLD: 0.5 % (ref 0–8)
ERYTHROCYTE [DISTWIDTH] IN BLOOD BY AUTOMATED COUNT: 12.9 % (ref 11.5–14.5)
EST. GFR  (AFRICAN AMERICAN): >60 ML/MIN/1.73 M^2
EST. GFR  (NON AFRICAN AMERICAN): >60 ML/MIN/1.73 M^2
GLUCOSE SERPL-MCNC: 118 MG/DL (ref 70–110)
HCT VFR BLD AUTO: 36.4 % (ref 37–48.5)
HGB BLD-MCNC: 11.4 G/DL (ref 12–16)
IMM GRANULOCYTES # BLD AUTO: 0.01 K/UL (ref 0–0.04)
IMM GRANULOCYTES NFR BLD AUTO: 0.2 % (ref 0–0.5)
LYMPHOCYTES # BLD AUTO: 2 K/UL (ref 1–4.8)
LYMPHOCYTES NFR BLD: 33.4 % (ref 18–48)
MCH RBC QN AUTO: 27.9 PG (ref 27–31)
MCHC RBC AUTO-ENTMCNC: 31.3 G/DL (ref 32–36)
MCV RBC AUTO: 89 FL (ref 82–98)
MONOCYTES # BLD AUTO: 0.4 K/UL (ref 0.3–1)
MONOCYTES NFR BLD: 6 % (ref 4–15)
NEUTROPHILS # BLD AUTO: 3.5 K/UL (ref 1.8–7.7)
NEUTROPHILS NFR BLD: 59.6 % (ref 38–73)
NRBC BLD-RTO: 0 /100 WBC
PLATELET # BLD AUTO: 263 K/UL (ref 150–450)
PMV BLD AUTO: 10.5 FL (ref 9.2–12.9)
POTASSIUM SERPL-SCNC: 4.1 MMOL/L (ref 3.5–5.1)
PROT SERPL-MCNC: 7.4 G/DL (ref 6–8.4)
RBC # BLD AUTO: 4.09 M/UL (ref 4–5.4)
SODIUM SERPL-SCNC: 137 MMOL/L (ref 136–145)
WBC # BLD AUTO: 5.86 K/UL (ref 3.9–12.7)

## 2022-05-18 PROCEDURE — 83036 HEMOGLOBIN GLYCOSYLATED A1C: CPT | Performed by: OBSTETRICS & GYNECOLOGY

## 2022-05-18 PROCEDURE — 99213 PR OFFICE/OUTPT VISIT, EST, LEVL III, 20-29 MIN: ICD-10-PCS | Mod: TH,S$PBB,, | Performed by: OBSTETRICS & GYNECOLOGY

## 2022-05-18 PROCEDURE — 99999 PR PBB SHADOW E&M-EST. PATIENT-LVL II: ICD-10-PCS | Mod: PBBFAC,,, | Performed by: OBSTETRICS & GYNECOLOGY

## 2022-05-18 PROCEDURE — 86592 SYPHILIS TEST NON-TREP QUAL: CPT | Performed by: OBSTETRICS & GYNECOLOGY

## 2022-05-18 PROCEDURE — 85025 COMPLETE CBC W/AUTO DIFF WBC: CPT | Performed by: OBSTETRICS & GYNECOLOGY

## 2022-05-18 PROCEDURE — 86850 RBC ANTIBODY SCREEN: CPT | Performed by: OBSTETRICS & GYNECOLOGY

## 2022-05-18 PROCEDURE — 99212 OFFICE O/P EST SF 10 MIN: CPT | Mod: PBBFAC,TH | Performed by: OBSTETRICS & GYNECOLOGY

## 2022-05-18 PROCEDURE — 99213 OFFICE O/P EST LOW 20 MIN: CPT | Mod: TH,S$PBB,, | Performed by: OBSTETRICS & GYNECOLOGY

## 2022-05-18 PROCEDURE — 87340 HEPATITIS B SURFACE AG IA: CPT | Performed by: OBSTETRICS & GYNECOLOGY

## 2022-05-18 PROCEDURE — 86803 HEPATITIS C AB TEST: CPT | Performed by: OBSTETRICS & GYNECOLOGY

## 2022-05-18 PROCEDURE — 86762 RUBELLA ANTIBODY: CPT | Performed by: OBSTETRICS & GYNECOLOGY

## 2022-05-18 PROCEDURE — 80053 COMPREHEN METABOLIC PANEL: CPT | Performed by: OBSTETRICS & GYNECOLOGY

## 2022-05-18 PROCEDURE — 87389 HIV-1 AG W/HIV-1&-2 AB AG IA: CPT | Performed by: OBSTETRICS & GYNECOLOGY

## 2022-05-18 PROCEDURE — 36415 COLL VENOUS BLD VENIPUNCTURE: CPT | Performed by: OBSTETRICS & GYNECOLOGY

## 2022-05-18 PROCEDURE — 99999 PR PBB SHADOW E&M-EST. PATIENT-LVL II: CPT | Mod: PBBFAC,,, | Performed by: OBSTETRICS & GYNECOLOGY

## 2022-05-18 RX ORDER — CLINDAMYCIN HYDROCHLORIDE 300 MG/1
300 CAPSULE ORAL 2 TIMES DAILY
Qty: 14 CAPSULE | Refills: 0 | Status: SHIPPED | OUTPATIENT
Start: 2022-05-18 | End: 2022-05-25

## 2022-05-18 RX ORDER — TERCONAZOLE 4 MG/G
1 CREAM VAGINAL NIGHTLY
Qty: 45 G | Refills: 0 | Status: SHIPPED | OUTPATIENT
Start: 2022-05-18 | End: 2022-05-25

## 2022-05-18 NOTE — PROGRESS NOTES
7w6d  No new complaint.  No nausea  Taking prenatal vitamins  Ultrasound done on 5/8/22 in Radiology put her EDC on 12/29/2022    Past Medical History:   Diagnosis Date    Asthma     Hypertension affecting pregnancy in third trimester 6/21/2021       History reviewed. No pertinent surgical history.    Family History   Problem Relation Age of Onset    Miscarriages / Stillbirths Mother     Hypertension Mother     Hypertension Maternal Grandmother     Diabetes Maternal Grandfather     Stroke Maternal Grandfather     Seizures Brother        Social History     Socioeconomic History    Marital status: Single   Tobacco Use    Smoking status: Never Smoker    Smokeless tobacco: Never Used   Substance and Sexual Activity    Alcohol use: Not Currently    Drug use: Yes     Types: Marijuana    Sexual activity: Yes     Partners: Male     Birth control/protection: None   Social History Narrative    Together since 5/24/2014    Both graduated from [employer]    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 30 capsule 6    promethazine (PHENERGAN) 25 MG tablet Take 1 tablet (25 mg total) by mouth every 6 (six) hours as needed for Nausea. 30 tablet 1    clindamycin (CLEOCIN HCL) 300 MG capsule Take 1 capsule (300 mg total) by mouth 2 (two) times a day. for 7 days (Patient not taking: Reported on 5/18/2022) 14 capsule 0    terconazole (TERAZOL 7) 0.4 % Crea Place 1 applicator vaginally every evening. for 7 days (Patient not taking: Reported on 5/18/2022) 45 g 0     No current facility-administered medications for this visit.       Review of patient's allergies indicates:   Allergen Reactions    Fish containing products Rash     Review of Systems   Constitutional: Positive for fatigue. Negative for fever, chills,  appetite change and unexpected weight change.   HENT: Positive for congestion. Negative for hearing loss, ear pain, sore throat, rhinorrhea, sneezing, mouth sores, neck pain, neck stiffness, voice change and postnasal drip.   Eyes: Negative for photophobia, itching and visual disturbance.   Respiratory: Negative for cough, chest tightness, shortness of breath and wheezing.   Cardiovascular: Negative for chest pain, palpitations and leg swelling.   Gastrointestinal: Positive for nausea, vomiting, abdominal pain and constipation. Negative for diarrhea, blood in stool and abdominal distention.   Genitourinary: Positive for vaginal discharge and pelvic pain. Negative for dysuria, urgency, frequency, hematuria, flank pain, decreased urine volume, vaginal bleeding, difficulty urinating, genital sores, vaginal pain, menstrual problem and dyspareunia.   Musculoskeletal: Positive for back pain. Negative for myalgias and joint swelling.   Skin: Negative for rash and wound.   Neurological: Positive for headaches. Negative for dizziness, tremors, syncope, speech difficulty, weakness, light-headedness and numbness.   Hematological: Negative for adenopathy. Does not bruise/bleed easily.   Psychiatric/Behavioral: Negative for suicidal ideas, hallucinations, confusion, sleep disturbance, dysphoric mood, decreased concentration and agitation. The patient is not nervous/anxious and is not hyperactive.     Exam normal     Prenatal profile  Prenatal vitamins  Baby aspirin at 12 weeks for history of PIH  Weekly [place] at 16 weeks for history of  birth at 34 weeks    Back next month    Vitals signs, FHTs, urine dip, and PE findings documented, reviewed and available in OB flow chart.   I spent a total of 20 minutes on the day of the visit. This includes face to face time and non-face to face time preparing to see the patient (eg, review of tests and charts), Obtaining and/or reviewing separately obtained history, Documenting  clinical information in the electronic or other health record, Independently interpreting results and communicating results to the patient/family/caregiver, or Care coordination.

## 2022-05-18 NOTE — PROGRESS NOTES
OB here for initial prenatal care.  Pt is not eating well because she has lost more than 10 pounds since last visit.  She went to ER on 05/08/2022 for spotting.  An ultrasound was done showing CARRINGTON: 12/29/2022. laurita

## 2022-05-19 LAB
ESTIMATED AVG GLUCOSE: 100 MG/DL (ref 68–131)
HBA1C MFR BLD: 5.1 % (ref 4–5.6)

## 2022-05-20 LAB
RPR SER QL: NORMAL
RUBV IGG SER-ACNC: 24.6 IU/ML
RUBV IGG SER-IMP: REACTIVE

## 2022-05-23 LAB
HBV SURFACE AG SERPL QL IA: NEGATIVE
HCV AB SERPL QL IA: NEGATIVE

## 2022-05-24 LAB — HIV 1+2 AB+HIV1 P24 AG SERPL QL IA: NEGATIVE

## 2022-06-15 ENCOUNTER — ROUTINE PRENATAL (OUTPATIENT)
Dept: OBSTETRICS AND GYNECOLOGY | Facility: CLINIC | Age: 22
End: 2022-06-15
Payer: MEDICAID

## 2022-06-15 VITALS — DIASTOLIC BLOOD PRESSURE: 60 MMHG | BODY MASS INDEX: 25.9 KG/M2 | WEIGHT: 160.5 LBS | SYSTOLIC BLOOD PRESSURE: 106 MMHG

## 2022-06-15 DIAGNOSIS — O09.891 HISTORY OF PRETERM DELIVERY, CURRENTLY PREGNANT IN FIRST TRIMESTER: ICD-10-CM

## 2022-06-15 DIAGNOSIS — O21.9 NAUSEA AND VOMITING IN PREGNANCY: ICD-10-CM

## 2022-06-15 DIAGNOSIS — Z87.59 HISTORY OF PREGNANCY INDUCED HYPERTENSION: ICD-10-CM

## 2022-06-15 DIAGNOSIS — Z3A.11 11 WEEKS GESTATION OF PREGNANCY: Primary | ICD-10-CM

## 2022-06-15 PROCEDURE — 99999 PR PBB SHADOW E&M-EST. PATIENT-LVL II: ICD-10-PCS | Mod: PBBFAC,,, | Performed by: OBSTETRICS & GYNECOLOGY

## 2022-06-15 PROCEDURE — 99999 PR PBB SHADOW E&M-EST. PATIENT-LVL II: CPT | Mod: PBBFAC,,, | Performed by: OBSTETRICS & GYNECOLOGY

## 2022-06-15 PROCEDURE — 99213 OFFICE O/P EST LOW 20 MIN: CPT | Mod: TH,S$PBB,, | Performed by: OBSTETRICS & GYNECOLOGY

## 2022-06-15 PROCEDURE — 99212 OFFICE O/P EST SF 10 MIN: CPT | Mod: PBBFAC,TH | Performed by: OBSTETRICS & GYNECOLOGY

## 2022-06-15 PROCEDURE — 99213 PR OFFICE/OUTPT VISIT, EST, LEVL III, 20-29 MIN: ICD-10-PCS | Mod: TH,S$PBB,, | Performed by: OBSTETRICS & GYNECOLOGY

## 2022-06-15 RX ORDER — PRENATAL SUPPLEMENT WITH DHA 1100; 30; 1.6; 1.8; 15; 2.5; .012; 1; .15; 20; 25; 2; 1000; 200; 20; 29 [IU]/1; MG/1; MG/1; MG/1; MG/1; MG/1; MG/1; MG/1; MG/1; MG/1; MG/1; MG/1; [IU]/1; MG/1; [IU]/1; MG/1
1 CAPSULE, GELATIN COATED ORAL DAILY
Qty: 30 CAPSULE | Refills: 6 | Status: SHIPPED | OUTPATIENT
Start: 2022-06-15 | End: 2022-07-19

## 2022-06-15 RX ORDER — ASPIRIN 81 MG/1
81 TABLET ORAL DAILY
Qty: 150 TABLET | Refills: 2 | Status: ON HOLD | OUTPATIENT
Start: 2022-06-15 | End: 2022-11-27

## 2022-06-15 NOTE — PROGRESS NOTES
11w6d  Patient comes in today for follow-up prenatal care  No new complaint.  No vaginal bleeding, contractions, or rupture of membranes.  Good fetal movements.    Eating well without significant nausea/vomiting  Not gaining weight   Taking prenatal vitamins    Discussed Connected MOM.    Program explained with risks and benefits.  Patient opted in.  Orders in.  Equipments will be sent to her along with specific instructions.    Discussed with patient MFM's findings and recommendations  Start baby aspirin today  [place] at 16 weeks  Continue with prenatal vitamins    Back in 4 weeks    Vitals signs, FHTs, urine dip, and PE findings documented, reviewed and available in OB flow chart.   I spent a total of 20 minutes on the day of the visit. This includes face to face time and non-face to face time preparing to see the patient (eg, review of tests and charts), Obtaining and/or reviewing separately obtained history, Documenting clinical information in the electronic or other health record, Independently interpreting results and communicating results to the patient/family/caregiver, or Care coordination.

## 2022-06-16 ENCOUNTER — PATIENT MESSAGE (OUTPATIENT)
Dept: ADMINISTRATIVE | Facility: OTHER | Age: 22
End: 2022-06-16
Payer: MEDICAID

## 2022-06-17 ENCOUNTER — HOSPITAL ENCOUNTER (EMERGENCY)
Facility: HOSPITAL | Age: 22
Discharge: HOME | End: 2022-06-17
Attending: EMERGENCY MEDICINE
Payer: MEDICAID

## 2022-06-17 VITALS
DIASTOLIC BLOOD PRESSURE: 64 MMHG | HEART RATE: 88 BPM | WEIGHT: 158 LBS | TEMPERATURE: 98 F | SYSTOLIC BLOOD PRESSURE: 118 MMHG | BODY MASS INDEX: 25.39 KG/M2 | RESPIRATION RATE: 19 BRPM | OXYGEN SATURATION: 98 % | HEIGHT: 66 IN

## 2022-06-17 DIAGNOSIS — J06.9 VIRAL URI: Primary | ICD-10-CM

## 2022-06-17 LAB
BILIRUB UR QL STRIP: NEGATIVE
CLARITY UR: CLEAR
COLOR UR: YELLOW
CTP QC/QA: YES
CTP QC/QA: YES
GLUCOSE UR QL STRIP: NEGATIVE
HGB UR QL STRIP: NEGATIVE
KETONES UR QL STRIP: NEGATIVE
LEUKOCYTE ESTERASE UR QL STRIP: NEGATIVE
NITRITE UR QL STRIP: NEGATIVE
PH UR STRIP: 7 [PH] (ref 5–8)
POC MOLECULAR INFLUENZA A AGN: NEGATIVE
POC MOLECULAR INFLUENZA B AGN: NEGATIVE
PROT UR QL STRIP: NEGATIVE
SARS-COV-2 RDRP RESP QL NAA+PROBE: NEGATIVE
SP GR UR STRIP: 1.01 (ref 1–1.03)
URN SPEC COLLECT METH UR: NORMAL
UROBILINOGEN UR STRIP-ACNC: NEGATIVE EU/DL

## 2022-06-17 PROCEDURE — 99284 EMERGENCY DEPT VISIT MOD MDM: CPT | Mod: 25

## 2022-06-17 PROCEDURE — 87502 INFLUENZA DNA AMP PROBE: CPT

## 2022-06-17 PROCEDURE — U0002 COVID-19 LAB TEST NON-CDC: HCPCS | Performed by: EMERGENCY MEDICINE

## 2022-06-17 PROCEDURE — 81003 URINALYSIS AUTO W/O SCOPE: CPT | Performed by: EMERGENCY MEDICINE

## 2022-06-17 RX ORDER — FLUTICASONE PROPIONATE 50 MCG
1 SPRAY, SUSPENSION (ML) NASAL 2 TIMES DAILY PRN
Qty: 15 G | Refills: 0 | Status: ON HOLD | OUTPATIENT
Start: 2022-06-17 | End: 2022-11-27

## 2022-06-17 RX ORDER — ACETAMINOPHEN 500 MG
1000 TABLET ORAL EVERY 6 HOURS PRN
Qty: 60 TABLET | Refills: 0 | Status: SHIPPED | OUTPATIENT
Start: 2022-06-17 | End: 2022-08-16 | Stop reason: SDUPTHER

## 2022-06-17 NOTE — Clinical Note
"Fiona Elizabethpatience Le was seen and treated in our emergency department on 6/17/2022.  She may return to work on 06/20/2022.       If you have any questions or concerns, please don't hesitate to call.      Earl Mcdaniels MD"

## 2022-06-18 ENCOUNTER — PATIENT MESSAGE (OUTPATIENT)
Dept: ADMINISTRATIVE | Facility: OTHER | Age: 22
End: 2022-06-18
Payer: MEDICAID

## 2022-06-18 NOTE — ED PROVIDER NOTES
Encounter Date: 6/17/2022    SCRIBE #1 NOTE: I, Salina Keita, am scribing for, and in the presence of,  Earl Mcdaniels MD. I have scribed the following portions of the note - Other sections scribed: HPI, ROS.       History     Chief Complaint   Patient presents with    COVID-19 Concerns     23 yo female to triage w/ complaints of runny nose, sneezing, HA, bodyaches, chills since yesterday. Pt is 12 weeks pregnant as well. Denies Vaginal bleeding/spotting and back pain. VSS, NAD, AAOx4     22 year old female, with pertinent medical history of asthma and HTN, presents to the ED to evaluate multiple worsening symptoms such as rhinorrhea, sneezing, headaches, myalgias, chills, congestion, eye discharge, ear pain, weakness, and cough that began 2 days ago. 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 Shortly after, she began feeling all the symptoms at once. Patient states the eye discharge, congestion, and ear pain is worse on her right side than left. She reports taking Robitussin with mild relief. Patient is 12 weeks pregnant and this is her third pregnancy. Patient's first pregnancy/child was born 2 months early due to high blood pressure. She denies vaginal bleeding, vaginal discharge, abdominal pain, back pain, nausea, vomiting, diarrhea, fever, or other associated symptoms at this time.    The history is provided by the patient. No  was used.     Review of patient's allergies indicates:   Allergen Reactions    Fish containing products Rash     Past Medical History:   Diagnosis Date    Asthma     Hypertension affecting pregnancy in third trimester 6/21/2021     No past surgical history on file.  Family History   Problem Relation Age of Onset    Miscarriages / Stillbirths Mother     Hypertension Mother     Hypertension Maternal Grandmother     Diabetes Maternal Grandfather     Stroke Maternal Grandfather     Seizures Brother       Social History     Tobacco Use    Smoking status: Never Smoker    Smokeless tobacco: Never Used   Substance Use Topics    Alcohol use: Not Currently    Drug use: Yes     Types: Marijuana     Review of Systems   Constitutional: Positive for chills. Negative for fever.   HENT: Positive for congestion, ear pain, rhinorrhea and sneezing.    Eyes: Positive for discharge.   Respiratory: Positive for cough.    Cardiovascular: Negative.    Gastrointestinal: Negative.    Genitourinary: Negative.    Musculoskeletal: Positive for myalgias. Negative for back pain.   Skin: Negative.    Neurological: Positive for weakness and headaches.       Physical Exam     Initial Vitals [06/17/22 1805]   BP Pulse Resp Temp SpO2   118/64 88 19 98.3 °F (36.8 °C) 98 %      MAP       --         Physical Exam    Nursing note and vitals reviewed.  Constitutional: She appears well-developed and well-nourished. She is not diaphoretic. No distress.   HENT:   Head: Normocephalic and atraumatic.   Nose: Nose normal.   Edematous right nasal passage   Eyes: EOM are normal. Pupils are equal, round, and reactive to light.   Neck: Neck supple. No JVD present.   Normal range of motion.  Cardiovascular: Normal rate, regular rhythm, normal heart sounds and intact distal pulses.   Pulmonary/Chest: Breath sounds normal. No stridor. No respiratory distress. She has no wheezes. She has no rales.   Abdominal: Abdomen is soft. Bowel sounds are normal. She exhibits no distension. There is no abdominal tenderness.   Musculoskeletal:         General: No tenderness or edema. Normal range of motion.      Cervical back: Normal range of motion and neck supple.     Neurological: She is alert and oriented to person, place, and time. She has normal strength.   Skin: Skin is warm and dry. Capillary refill takes less than 2 seconds. No rash noted. No erythema.         ED Course   Procedures  Labs Reviewed   URINALYSIS, REFLEX TO URINE CULTURE    Narrative:     Specimen  Source->Urine   POCT INFLUENZA A/B MOLECULAR   SARS-COV-2 RDRP GENE          Imaging Results    None          Medications - No data to display  Medical Decision Making:   History:   Old Medical Records: I decided to obtain old medical records.  Clinical Tests:   Lab Tests: Ordered and Reviewed    MDM:    22-year-old female with past medical history as noted above presents with cough, congestion.  ED workup notable for negative urinalysis, negative COVID, negative flu.  Bedside ultrasound showing positive IUP rate 154 beats per minute.  Patient with notable edematous right [place] with some air presentation consistent with viral  Illness.  Discussed further symptomatic care with patient at home including follow-up with OB.  No vaginal bleeding or abdominal cramping to warrant further investigation at this time. Discussed diagnosis and further treatment with patient, including f/u.  Return precautions given and all questions answered. Patient in understanding of plan.  Pt discharged to home improved and stable.          Scribe Attestation:   Scribe #1: I performed the above scribed service and the documentation accurately describes the services I performed. I attest to the accuracy of the note.                 Clinical Impression:   Final diagnoses:  [J06.9] Viral URI (Primary)          ED Disposition Condition    Discharge Stable        ED Prescriptions     Medication Sig Dispense Start Date End Date Auth. Provider    acetaminophen (TYLENOL) 500 MG tablet Take 2 tablets (1,000 mg total) by mouth every 6 (six) hours as needed. 60 tablet 6/17/2022  Earl Mcdaniels MD    fluticasone propionate (FLONASE) 50 mcg/actuation nasal spray 1 spray (50 mcg total) by Each Nostril route 2 (two) times daily as needed. 15 g 6/17/2022  Earl Mcdaniels MD        Follow-up Information     Follow up With Specialties Details Why Contact Info    [facility] - Emergency Dept Emergency Medicine Go to  If symptoms worsen [address]  [place] [id number] [phone]    Charles Hein MD Pediatrics Go in 1 week As needed [address] [place] [address]  [id number]  [place] [id number] [phone]          I, Earl Mcdaniels M.D., personally performed the services described in this documentation. All medical record entries made by the scribe were at my direction and in my presence. I have reviewed the chart and agree that the record reflects my personal performance and is accurate and complete.     Earl Mcdaniels MD  06/18/22 6534

## 2022-06-18 NOTE — ED TRIAGE NOTES
Pt c/o of runny nose, sneezing, HA, bodyaches, chills since yesterday. Pt reports she is 12 weeks pregnant. Pt denies fever.

## 2022-07-19 ENCOUNTER — ROUTINE PRENATAL (OUTPATIENT)
Dept: OBSTETRICS AND GYNECOLOGY | Facility: CLINIC | Age: 22
End: 2022-07-19
Payer: MEDICAID

## 2022-07-19 VITALS
SYSTOLIC BLOOD PRESSURE: 118 MMHG | DIASTOLIC BLOOD PRESSURE: 64 MMHG | WEIGHT: 160.69 LBS | BODY MASS INDEX: 25.94 KG/M2

## 2022-07-19 DIAGNOSIS — Z87.59 HISTORY OF PREGNANCY INDUCED HYPERTENSION: ICD-10-CM

## 2022-07-19 DIAGNOSIS — Z3A.16 16 WEEKS GESTATION OF PREGNANCY: Primary | ICD-10-CM

## 2022-07-19 DIAGNOSIS — O09.891 HISTORY OF PRETERM DELIVERY, CURRENTLY PREGNANT IN FIRST TRIMESTER: ICD-10-CM

## 2022-07-19 PROCEDURE — 99213 OFFICE O/P EST LOW 20 MIN: CPT | Mod: TH,S$PBB,, | Performed by: OBSTETRICS & GYNECOLOGY

## 2022-07-19 PROCEDURE — 99213 OFFICE O/P EST LOW 20 MIN: CPT | Mod: PBBFAC,TH | Performed by: OBSTETRICS & GYNECOLOGY

## 2022-07-19 PROCEDURE — 99213 PR OFFICE/OUTPT VISIT, EST, LEVL III, 20-29 MIN: ICD-10-PCS | Mod: TH,S$PBB,, | Performed by: OBSTETRICS & GYNECOLOGY

## 2022-07-19 PROCEDURE — 99999 PR PBB SHADOW E&M-EST. PATIENT-LVL III: CPT | Mod: PBBFAC,,, | Performed by: OBSTETRICS & GYNECOLOGY

## 2022-07-19 PROCEDURE — 99999 PR PBB SHADOW E&M-EST. PATIENT-LVL III: ICD-10-PCS | Mod: PBBFAC,,, | Performed by: OBSTETRICS & GYNECOLOGY

## 2022-07-19 RX ORDER — ASCORBIC ACID, CHOLECALCIFEROL, DL-.ALPHA.-TOCOPHEROL ACETATE, THIAMINE HYDROCHLORIDE, RIBOFLAVIN, NIACINAMIDE, PYRIDOXINE HYDROCHLORIDE, FOLIC ACID, CYANOCOBALAMIN, CALCIUM CARBONATE, FERROUS FUMARATE, ZINC OXIDE, CUPRIC SULFATE 100; 400; 30; 1.6; 1.6; 20; 3.1; 1; 12; 200; 27; 10; 2 MG/1; [IU]/1; [IU]/1; MG/1; MG/1; MG/1; MG/1; MG/1; UG/1; MG/1; MG/1; MG/1; MG/1
1 TABLET, COATED ORAL DAILY
COMMUNITY
Start: 2022-06-15 | End: 2022-12-27

## 2022-07-19 NOTE — PROGRESS NOTES
16w5d  Patient comes in today for follow-up prenatal care  No new complaint.  No vaginal bleeding, contractions, or rupture of membranes.  Good fetal movements.    Eating well without significant nausea/vomiting  Not gaining weight   Taking prenatal vitamins    Doing well with Connected MOM.  Nothing transferring yet.  Review data on patient's phone.  All normal    Discussed with patient MFM's findings and recommendations  Continue with baby aspirin daily  [place] at 16 weeks. Thursday at 1315 7/21/2022  Continue with prenatal vitamins  Quad screen  Back in 4 weeks    Vitals signs, FHTs, urine dip, and PE findings documented, reviewed and available in OB flow chart.   I spent a total of 20 minutes on the day of the visit. This includes face to face time and non-face to face time preparing to see the patient (eg, review of tests and charts), Obtaining and/or reviewing separately obtained history, Documenting clinical information in the electronic or other health record, Independently interpreting results and communicating results to the patient/family/caregiver, or Care coordination.

## 2022-07-27 ENCOUNTER — CLINICAL SUPPORT (OUTPATIENT)
Dept: OBSTETRICS AND GYNECOLOGY | Facility: CLINIC | Age: 22
End: 2022-07-27
Payer: MEDICAID

## 2022-07-27 DIAGNOSIS — Z87.51 HISTORY OF PRE-TERM LABOR: Primary | ICD-10-CM

## 2022-07-27 PROCEDURE — 96372 THER/PROPH/DIAG INJ SC/IM: CPT | Mod: PBBFAC

## 2022-07-27 RX ORDER — HYDROXYPROGESTERONE CAPROATE 1250 MG/5ML
250 INJECTION INTRAMUSCULAR
Status: DISCONTINUED | OUTPATIENT
Start: 2022-07-27 | End: 2022-11-27

## 2022-07-27 RX ADMIN — HYDROXYPROGESTERONE CAPROATE 250 MG: 250 INJECTION INTRAMUSCULAR at 02:07

## 2022-07-27 NOTE — PROGRESS NOTES
Pt arrived cora injection given without difficulty. Pt instructed to wait in the waiting area for 15 minutes for any adverse reactions. Pt verbalized understanding.

## 2022-08-04 ENCOUNTER — CLINICAL SUPPORT (OUTPATIENT)
Dept: OBSTETRICS AND GYNECOLOGY | Facility: CLINIC | Age: 22
End: 2022-08-04
Payer: MEDICAID

## 2022-08-04 DIAGNOSIS — Z87.51 HISTORY OF PRE-TERM LABOR: Primary | ICD-10-CM

## 2022-08-04 PROCEDURE — 96372 THER/PROPH/DIAG INJ SC/IM: CPT | Mod: PBBFAC

## 2022-08-04 RX ADMIN — HYDROXYPROGESTERONE CAPROATE 250 MG: 250 INJECTION INTRAMUSCULAR at 02:08

## 2022-08-08 ENCOUNTER — PATIENT MESSAGE (OUTPATIENT)
Dept: MATERNAL FETAL MEDICINE | Facility: CLINIC | Age: 22
End: 2022-08-08
Payer: MEDICAID

## 2022-08-11 ENCOUNTER — CLINICAL SUPPORT (OUTPATIENT)
Dept: OBSTETRICS AND GYNECOLOGY | Facility: CLINIC | Age: 22
End: 2022-08-11
Payer: MEDICAID

## 2022-08-11 DIAGNOSIS — Z87.51 HISTORY OF PRE-TERM LABOR: Primary | ICD-10-CM

## 2022-08-11 PROCEDURE — 96372 THER/PROPH/DIAG INJ SC/IM: CPT | Mod: PBBFAC

## 2022-08-11 RX ADMIN — HYDROXYPROGESTERONE CAPROATE 250 MG: 250 INJECTION INTRAMUSCULAR at 08:08

## 2022-08-15 RX ORDER — ACETAMINOPHEN 500 MG
1000 TABLET ORAL
Status: COMPLETED | OUTPATIENT
Start: 2022-08-15 | End: 2022-08-16

## 2022-08-16 ENCOUNTER — HOSPITAL ENCOUNTER (OUTPATIENT)
Facility: HOSPITAL | Age: 22
Discharge: HOME | End: 2022-08-16
Attending: EMERGENCY MEDICINE | Admitting: OBSTETRICS & GYNECOLOGY
Payer: MEDICAID

## 2022-08-16 VITALS
RESPIRATION RATE: 16 BRPM | DIASTOLIC BLOOD PRESSURE: 59 MMHG | OXYGEN SATURATION: 100 % | HEIGHT: 66 IN | HEART RATE: 76 BPM | TEMPERATURE: 98 F | SYSTOLIC BLOOD PRESSURE: 104 MMHG | WEIGHT: 158 LBS | BODY MASS INDEX: 25.39 KG/M2

## 2022-08-16 DIAGNOSIS — R10.9 ABDOMINAL PAIN DURING PREGNANCY IN SECOND TRIMESTER: ICD-10-CM

## 2022-08-16 DIAGNOSIS — S13.4XXA WHIPLASH INJURY TO NECK, INITIAL ENCOUNTER: ICD-10-CM

## 2022-08-16 DIAGNOSIS — M54.50 ACUTE BILATERAL LOW BACK PAIN WITHOUT SCIATICA: ICD-10-CM
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
## 2022-08-16 DIAGNOSIS — O26.892 ABDOMINAL PAIN DURING PREGNANCY IN SECOND TRIMESTER: ICD-10-CM

## 2022-08-16 DIAGNOSIS — R10.2 SUPRAPUBIC PAIN: ICD-10-CM

## 2022-08-16 LAB
BILIRUB UR QL STRIP: NEGATIVE
CLARITY UR: ABNORMAL
COLOR UR: YELLOW
GLUCOSE UR QL STRIP: NEGATIVE
HGB UR QL STRIP: NEGATIVE
KETONES UR QL STRIP: NEGATIVE
LEUKOCYTE ESTERASE UR QL STRIP: ABNORMAL
MICROSCOPIC COMMENT: NORMAL
NITRITE UR QL STRIP: NEGATIVE
PH UR STRIP: 7 [PH] (ref 5–8)
PROT UR QL STRIP: ABNORMAL
RBC #/AREA URNS HPF: 2 /HPF (ref 0–4)
SP GR UR STRIP: 1.02 (ref 1–1.03)
SQUAMOUS #/AREA URNS HPF: 22 /HPF
URN SPEC COLLECT METH UR: ABNORMAL
UROBILINOGEN UR STRIP-ACNC: ABNORMAL EU/DL
WBC #/AREA URNS HPF: 3 /HPF (ref 0–5)
WBC CLUMPS URNS QL MICRO: NORMAL

## 2022-08-16 PROCEDURE — 81000 URINALYSIS NONAUTO W/SCOPE: CPT | Performed by: EMERGENCY MEDICINE

## 2022-08-16 PROCEDURE — 25000003 PHARM REV CODE 250: Performed by: PHYSICIAN ASSISTANT

## 2022-08-16 PROCEDURE — 99211 OFF/OP EST MAY X REQ PHY/QHP: CPT

## 2022-08-16 RX ORDER — ACETAMINOPHEN 500 MG
1000 TABLET ORAL EVERY 6 HOURS PRN
Qty: 30 TABLET | Refills: 1 | Status: ON HOLD | OUTPATIENT
Start: 2022-08-16 | End: 2022-11-27

## 2022-08-16 RX ADMIN — ACETAMINOPHEN 1000 MG: 500 TABLET ORAL at 12:08
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
## 2022-08-16 NOTE — DISCHARGE INSTRUCTIONS
Home Undelivered Discharge Instructions    After Discharge Orders:    Future Appointments   Date Time Provider Department Center   8/17/2022  2:45 PM INJECTION,  OB-GYN [facility]   8/17/2022  3:00 PM Mingo Santamaria MD [facility]   8/25/2022  2:45 PM INJECTION,  OB-GYN [facility]   9/1/2022  8:30 AM INJECTION,  OB-GYN [facility]   9/7/2022 11:00 AM ULTRASOUND ROOM 2, [facility] MATERNAL FETAL MEDICINE [facility]   9/8/2022  2:45 PM INJECTION,  OB-GYN [facility]   9/14/2022  1:15 PM INJECTION,  OB-GYN [facility]   9/14/2022  1:30 PM Mingo Santamaria MD [facility]   10/12/2022  1:15 PM [facility]   10/26/2022  1:15 PM Mingo Santamaria MD [facility]         Current Discharge Medication List        CONTINUE these medications which have CHANGED    Details   acetaminophen (TYLENOL) 500 MG tablet Take 2 tablets (1,000 mg total) by mouth every 6 (six) hours as needed for Pain.  Qty: 30 tablet, Refills: 1           CONTINUE these medications which have NOT CHANGED    Details   aspirin (ECOTRIN) 81 MG EC tablet Take 1 tablet (81 mg total) by mouth once daily.  Qty: 150 tablet, Refills: 2    Associated Diagnoses: 11 weeks gestation of pregnancy; History of pregnancy induced hypertension      fluticasone propionate (FLONASE) 50 mcg/actuation nasal spray 1 spray (50 mcg total) by Each Nostril route 2 (two) times daily as needed.  Qty: 15 g, Refills: 0      promethazine (PHENERGAN) 25 MG tablet Take 1 tablet (25 mg total) by mouth every 6 (six) hours as needed for Nausea.  Qty: 30 tablet, Refills: 1    Associated Diagnoses: 6 weeks gestation of pregnancy       27 mg iron- 1 mg Tab Take 1 tablet by mouth once daily.                     Diet:  normal diet as tolerated    Rest: normal activity as tolerated    Other instructions: Do kick counts once a day on your baby. Choose the time of  day your baby is most active. Get in a comfortable lying or sitting position and time how long it takes to feel 10 kicks, twists, turns, swishes, or rolls. Call your physician or midwife if there have not been 10 kicks in 2 hours    Call physician or midwife, return to Labor and Delivery, call 911, or go to the nearest Emergency Room if: increased leakage or fluid, decreased fetal movement, bleeding from vaginal area, persistent headache, or vision change
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
## 2022-08-16 NOTE — Clinical Note
"Fiona Valdeskarma Le was seen and treated in our emergency department on 8/15/2022.  She may return with limitations on 08/17/2022.  Light duty for 1 week.      Sincerely,      Lisa Soria MD    "
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
## 2022-08-19 ENCOUNTER — PATIENT MESSAGE (OUTPATIENT)
Dept: MATERNAL FETAL MEDICINE | Facility: CLINIC | Age: 22
End: 2022-08-19
Payer: MEDICAID

## 2022-08-23 ENCOUNTER — PATIENT MESSAGE (OUTPATIENT)
Dept: MATERNAL FETAL MEDICINE | Facility: CLINIC | Age: 22
End: 2022-08-23
Payer: MEDICAID

## 2022-08-24 ENCOUNTER — PROCEDURE VISIT (OUTPATIENT)
Dept: MATERNAL FETAL MEDICINE | Facility: CLINIC | Age: 22
End: 2022-08-24
Payer: MEDICAID

## 2022-08-24 DIAGNOSIS — Z3A.16 16 WEEKS GESTATION OF PREGNANCY: ICD-10-CM

## 2022-08-24 DIAGNOSIS — Z87.59 HISTORY OF PREGNANCY INDUCED HYPERTENSION: ICD-10-CM

## 2022-08-24 DIAGNOSIS — O09.891 HISTORY OF PRETERM DELIVERY, CURRENTLY PREGNANT IN FIRST TRIMESTER: ICD-10-CM

## 2022-08-24 PROCEDURE — 76805 OB US >/= 14 WKS SNGL FETUS: CPT | Mod: PBBFAC | Performed by: OBSTETRICS & GYNECOLOGY

## 2022-08-24 PROCEDURE — 76805 US OB/GYN EXTENDED PROCEDURE (VIEWPOINT): ICD-10-PCS | Mod: 26,S$PBB,, | Performed by: OBSTETRICS & GYNECOLOGY

## 2022-08-25 ENCOUNTER — CLINICAL SUPPORT (OUTPATIENT)
Dept: OBSTETRICS AND GYNECOLOGY | Facility: CLINIC | Age: 22
End: 2022-08-25
Payer: MEDICAID

## 2022-08-25 DIAGNOSIS — Z87.51 HISTORY OF PRE-TERM LABOR: Primary | ICD-10-CM

## 2022-08-25 PROCEDURE — 99999 PR PBB SHADOW E&M-EST. PATIENT-LVL II: CPT | Mod: PBBFAC,,,

## 2022-08-25 PROCEDURE — 96372 THER/PROPH/DIAG INJ SC/IM: CPT | Mod: PBBFAC

## 2022-08-25 PROCEDURE — 99999 PR PBB SHADOW E&M-EST. PATIENT-LVL II: ICD-10-PCS | Mod: PBBFAC,,,

## 2022-08-25 RX ADMIN — HYDROXYPROGESTERONE CAPROATE 250 MG: 250 INJECTION INTRAMUSCULAR at 09:08

## 2022-09-01 ENCOUNTER — CLINICAL SUPPORT (OUTPATIENT)
Dept: OBSTETRICS AND GYNECOLOGY | Facility: CLINIC | Age: 22
End: 2022-09-01
Payer: MEDICAID

## 2022-09-01 DIAGNOSIS — Z87.51 HISTORY OF PRE-TERM LABOR: Primary | ICD-10-CM

## 2022-09-01 PROCEDURE — 99999 PR PBB SHADOW E&M-EST. PATIENT-LVL I: ICD-10-PCS | Mod: PBBFAC,,,

## 2022-09-01 PROCEDURE — 99999 PR PBB SHADOW E&M-EST. PATIENT-LVL I: CPT | Mod: PBBFAC,,,

## 2022-09-01 PROCEDURE — 96372 THER/PROPH/DIAG INJ SC/IM: CPT | Mod: PBBFAC

## 2022-09-01 RX ADMIN — HYDROXYPROGESTERONE CAPROATE 250 MG: 250 INJECTION INTRAMUSCULAR at 03:09

## 2022-09-10 ENCOUNTER — HOSPITAL ENCOUNTER (EMERGENCY)
Facility: HOSPITAL | Age: 22
Discharge: HOME | End: 2022-09-10
Attending: EMERGENCY MEDICINE
Payer: MEDICAID

## 2022-09-10 VITALS
TEMPERATURE: 98 F | HEART RATE: 88 BPM | SYSTOLIC BLOOD PRESSURE: 135 MMHG | WEIGHT: 154 LBS | HEIGHT: 72 IN | RESPIRATION RATE: 17 BRPM | BODY MASS INDEX: 20.86 KG/M2 | OXYGEN SATURATION: 97 % | DIASTOLIC BLOOD PRESSURE: 65 MMHG

## 2022-09-10 DIAGNOSIS — R42 DIZZINESS: ICD-10-CM

## 2022-09-10 DIAGNOSIS — R51.9 ACUTE NONINTRACTABLE HEADACHE, UNSPECIFIED HEADACHE TYPE: Primary | ICD-10-CM

## 2022-09-10 LAB
ALBUMIN SERPL BCP-MCNC: 2.7 G/DL (ref 3.5–5.2)
ALP SERPL-CCNC: 66 U/L (ref 55–135)
ALT SERPL W/O P-5'-P-CCNC: 8 U/L (ref 10–44)
ANION GAP SERPL CALC-SCNC: 7 MMOL/L (ref 8–16)
AST SERPL-CCNC: 12 U/L (ref 10–40)
BASOPHILS # BLD AUTO: 0.02 K/UL (ref 0–0.2)
BASOPHILS NFR BLD: 0.1 % (ref 0–1.9)
BILIRUB SERPL-MCNC: 0.1 MG/DL (ref 0.1–1)
BILIRUB UR QL STRIP: NEGATIVE
BUN SERPL-MCNC: 8 MG/DL (ref 6–20)
CALCIUM SERPL-MCNC: 8.7 MG/DL (ref 8.7–10.5)
CHLORIDE SERPL-SCNC: 106 MMOL/L (ref 95–110)
CLARITY UR REFRACT.AUTO: CLEAR
CO2 SERPL-SCNC: 21 MMOL/L (ref 23–29)
COLOR UR AUTO: YELLOW
CREAT SERPL-MCNC: 0.5 MG/DL (ref 0.5–1.4)
DIFFERENTIAL METHOD: ABNORMAL
EOSINOPHIL # BLD AUTO: 0.2 K/UL (ref 0–0.5)
EOSINOPHIL NFR BLD: 1.3 % (ref 0–8)
ERYTHROCYTE [DISTWIDTH] IN BLOOD BY AUTOMATED COUNT: 13.2 % (ref 11.5–14.5)
EST. GFR  (NO RACE VARIABLE): >60 ML/MIN/1.73 M^2
GLUCOSE SERPL-MCNC: 82 MG/DL (ref 70–110)
GLUCOSE UR QL STRIP: NEGATIVE
HCT VFR BLD AUTO: 29.9 % (ref 37–48.5)
HGB BLD-MCNC: 9.7 G/DL (ref 12–16)
HGB UR QL STRIP: NEGATIVE
IMM GRANULOCYTES # BLD AUTO: 0.09 K/UL (ref 0–0.04)
IMM GRANULOCYTES NFR BLD AUTO: 0.6 % (ref 0–0.5)
KETONES UR QL STRIP: NEGATIVE
LEUKOCYTE ESTERASE UR QL STRIP: NEGATIVE
LYMPHOCYTES # BLD AUTO: 3.3 K/UL (ref 1–4.8)
LYMPHOCYTES NFR BLD: 23.5 % (ref 18–48)
MAGNESIUM SERPL-MCNC: 1.8 MG/DL (ref 1.6–2.6)
MCH RBC QN AUTO: 29 PG (ref 27–31)
MCHC RBC AUTO-ENTMCNC: 32.4 G/DL (ref 32–36)
MCV RBC AUTO: 90 FL (ref 82–98)
MONOCYTES # BLD AUTO: 0.9 K/UL (ref 0.3–1)
MONOCYTES NFR BLD: 6.6 % (ref 4–15)
NEUTROPHILS # BLD AUTO: 9.5 K/UL (ref 1.8–7.7)
NEUTROPHILS NFR BLD: 67.9 % (ref 38–73)
NITRITE UR QL STRIP: NEGATIVE
NRBC BLD-RTO: 0 /100 WBC
PH UR STRIP: 7 [PH] (ref 5–8)
PLATELET # BLD AUTO: 220 K/UL (ref 150–450)
PMV BLD AUTO: 10.8 FL (ref 9.2–12.9)
POTASSIUM SERPL-SCNC: 3.8 MMOL/L (ref 3.5–5.1)
PROT SERPL-MCNC: 6.7 G/DL (ref 6–8.4)
PROT UR QL STRIP: NEGATIVE
RBC # BLD AUTO: 3.34 M/UL (ref 4–5.4)
SODIUM SERPL-SCNC: 134 MMOL/L (ref 136–145)
SP GR UR STRIP: 1.01 (ref 1–1.03)
URN SPEC COLLECT METH UR: NORMAL
WBC # BLD AUTO: 14.05 K/UL (ref 3.9–12.7)

## 2022-09-10 PROCEDURE — 96374 THER/PROPH/DIAG INJ IV PUSH: CPT

## 2022-09-10 PROCEDURE — 85025 COMPLETE CBC W/AUTO DIFF WBC: CPT | Performed by: EMERGENCY MEDICINE

## 2022-09-10 PROCEDURE — 63600175 PHARM REV CODE 636 W HCPCS: Performed by: EMERGENCY MEDICINE

## 2022-09-10 PROCEDURE — 99284 PR EMERGENCY DEPT VISIT,LEVEL IV: ICD-10-PCS | Mod: ,,, | Performed by: EMERGENCY MEDICINE

## 2022-09-10 PROCEDURE — 99284 EMERGENCY DEPT VISIT MOD MDM: CPT | Mod: ,,, | Performed by: EMERGENCY MEDICINE

## 2022-09-10 PROCEDURE — 93010 EKG 12-LEAD: ICD-10-PCS | Mod: ,,, | Performed by: INTERNAL MEDICINE

## 2022-09-10 PROCEDURE — 83735 ASSAY OF MAGNESIUM: CPT | Performed by: EMERGENCY MEDICINE

## 2022-09-10 PROCEDURE — 96361 HYDRATE IV INFUSION ADD-ON: CPT

## 2022-09-10 PROCEDURE — 25000003 PHARM REV CODE 250: Performed by: EMERGENCY MEDICINE

## 2022-09-10 PROCEDURE — 80053 COMPREHEN METABOLIC PANEL: CPT | Performed by: EMERGENCY MEDICINE

## 2022-09-10 PROCEDURE — 93010 ELECTROCARDIOGRAM REPORT: CPT | Mod: ,,, | Performed by: INTERNAL MEDICINE

## 2022-09-10 PROCEDURE — 99284 EMERGENCY DEPT VISIT MOD MDM: CPT | Mod: 25

## 2022-09-10 PROCEDURE — 93005 ELECTROCARDIOGRAM TRACING: CPT

## 2022-09-10 PROCEDURE — 81003 URINALYSIS AUTO W/O SCOPE: CPT | Performed by: EMERGENCY MEDICINE

## 2022-09-10 RX ORDER — ACETAMINOPHEN 500 MG
1000 TABLET ORAL
Status: COMPLETED | OUTPATIENT
Start: 2022-09-10 | End: 2022-09-10

## 2022-09-10 RX ORDER — METOCLOPRAMIDE HYDROCHLORIDE 5 MG/ML
10 INJECTION INTRAMUSCULAR; INTRAVENOUS
Status: COMPLETED | OUTPATIENT
Start: 2022-09-10 | End: 2022-09-10

## 2022-09-10 RX ADMIN — ACETAMINOPHEN 1000 MG: 500 TABLET ORAL at 08:09

## 2022-09-10 RX ADMIN — SODIUM CHLORIDE 1000 ML: 0.9 INJECTION, SOLUTION INTRAVENOUS at 08:09

## 2022-09-10 RX ADMIN — METOCLOPRAMIDE 10 MG: 5 INJECTION, SOLUTION INTRAMUSCULAR; INTRAVENOUS at 08:09

## 2022-09-10 NOTE — Clinical Note
"Fiona Valdeskarma Le was seen and treated in our emergency department on 9/10/2022.  She may return to work on 09/12/2022.       If you have any questions or concerns, please don't hesitate to call.      Md Michaels RN    "

## 2022-09-10 NOTE — PROVIDER PROGRESS NOTES - EMERGENCY DEPT.
Encounter Date: 9/10/2022    ED Physician Progress Notes         EKG - STEMI Decision  Initial Reading: No STEMI present.  Response: No Action Needed.

## 2022-09-11 NOTE — DISCHARGE INSTRUCTIONS
If you experience fever, neck stiffness, vision changes, or any new or concerning symptoms, return to the emergency department    Call your OBGYN clinic on Monday to notify them of your visit schedule re-evaluation of blood pressure check     Return to the emergency department immediately if any new or concerning symptoms occur    Take over the counter acetaminophen 1000mg every 6 hours as needed for pain

## 2022-09-11 NOTE — ED TRIAGE NOTES
First pt contact, pt states she has a headache at the front of her head since 7am, denies trauma, state she saw black dots earlyer today. States no more blurry vision or black dots. Denies other symptoms. Headache 9/10, aaox4, gcs15.

## 2022-09-11 NOTE — ED PROVIDER NOTES
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 23 weeks pregnant with third child hx of preeclampsia in previous pregnancies      22-year-old female  at 23 weeks gestational age who presents with complaint of headache.  Patient reports that she woke this morning with a headache.  She thinks it is because she did not eat last night or this morning.  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  She reports seeing some black spots in her vision.  She reports a history of preeclampsia in the peripartum.  Her last 2 pregnancies.  She reports that she receives hydroxyprogesterone injections during this pregnancy and takes a baby aspirin.  She denies any vision changes.  She has no neck stiffness.  She has no fever chills.  She denies any behavior changes.  She has had no numbness or weakness to extremities or face.    Review of patient's allergies indicates:   Allergen Reactions    Fish containing products Rash     Past Medical History:   Diagnosis Date    Asthma     Hypertension affecting pregnancy in third trimester 2021     No past surgical history on file.  Family History   Problem Relation Age of Onset    Miscarriages / Stillbirths Mother     Hypertension Mother     Hypertension Maternal Grandmother     Diabetes Maternal Grandfather     Stroke Maternal Grandfather     Seizures Brother      Social History     Tobacco Use    Smoking status: Never    Smokeless tobacco: Never   Substance Use Topics    Alcohol use: Not Currently    Drug use: Yes     Types: Marijuana     Review of Systems   Constitutional:  Negative for chills and fever.   HENT:  Positive for sinus pressure and sinus pain. Negative for congestion, ear pain, facial swelling and hearing loss.    Eyes:  Negative for  photophobia, pain, discharge, redness, itching and visual disturbance.        Positive floaters   Respiratory:  Negative for cough and shortness of breath.    Cardiovascular:  Negative for chest pain and leg swelling.   Gastrointestinal:  Negative for nausea and vomiting.   Musculoskeletal:  Negative for neck pain and neck stiffness.   Neurological:  Positive for headaches. Negative for dizziness, tremors, seizures, syncope, facial asymmetry, speech difficulty, weakness, light-headedness and numbness.   All other systems reviewed and are negative.    Physical Exam     Initial Vitals [09/10/22 1817]   BP Pulse Resp Temp SpO2   128/66 90 18 98.1 °F (36.7 °C) 100 %      MAP       --         Physical Exam  General: AO x 3, NAD. Well nourished. Well Developed  Head: Normocephalic and Atraumatic  HEENT: PERRLA. EOMI. OP Clear  Neck: Supple, Nontender in midline.  Cardiovascular: RRR. No m/r/g. 2+ Distal Pulses  Pulm/Chest: Chest nontender. Lungs clear to auscultation. No respiratory distress.  Abdomen: Nontender. Nondistended.  Gravid c/w dates.  No rigidity, rebound, or guarding  MSK: extremities atraumatic x 4. Gait normal  Ext: no clubbing, cyanosis, or edema  Neuro: GCS 15. CN II-XII intact. Intact symmetric sensation x 4 extremities  Skin: no bullae, petechiae, or purpura. Warm, dry, and intact.  Psych: normal mood and affect.    ED Course   Procedures  Labs Reviewed   CBC W/ AUTO DIFFERENTIAL - Abnormal; Notable for the following components:       Result Value    WBC 14.05 (*)     RBC 3.34 (*)     Hemoglobin 9.7 (*)     Hematocrit 29.9 (*)     Immature Granulocytes 0.6 (*)     Gran # (ANC) 9.5 (*)     Immature Grans (Abs) 0.09 (*)     All other components within normal limits   COMPREHENSIVE METABOLIC PANEL - Abnormal; Notable for the following components:    Sodium 134 (*)     CO2 21 (*)     Albumin 2.7 (*)     ALT 8 (*)     Anion Gap 7 (*)     All other components within normal limits   URINALYSIS, REFLEX TO URINE  CULTURE    Narrative:     Specimen Source->Urine   MAGNESIUM        ECG Results              EKG 12-lead (Final result)  Result time 09/11/22 09:42:51      Final result by Interface, Lab In [facility] (09/11/22 09:42:51)                   Narrative:    Test Reason : R42,    Vent. Rate : 080 BPM     Atrial Rate : 080 BPM     P-R Int : 122 ms          QRS Dur : 074 ms      QT Int : 362 ms       P-R-T Axes : 026 049 019 degrees     QTc Int : 417 ms    Normal sinus rhythm  Normal ECG  No previous ECGs available  Confirmed by Siddhartha LYN MD ([id number]) on 9/11/2022 9:42:44 AM    Referred By: [email or web address]   SELF           Confirmed By:Siddhartha LYN MD                                  Imaging Results    None          Medications   metoclopramide HCl injection 10 mg (10 mg Intravenous Given 9/10/22 2006)   sodium chloride 0.9% bolus 1,000 mL (0 mLs Intravenous Stopped 9/10/22 2236)   acetaminophen tablet 1,000 mg (1,000 mg Oral Given 9/10/22 2007)     Medical Decision Making:   Initial Assessment:   Doubt preeclampsia in this patient without hypertension at 23 weeks gestational age.  Considered central venous sinus thrombosis.  However, patient is not in late stage pregnancy.  She is taking a peer progesterone product not on estrogen containing 1.  She has no neurologic complaints or deficits.  Will check for proteinuria.  Will check electrolytes.  Do not believe that MRI CSF assays are clinically indicated at this time based on history, examination, coarse.  Will treat headache  Clinical Tests:   Lab Tests: Ordered and Reviewed  ED Management:  Lab assays reassuring. Headache resolved prior to completion of reglan and IVF. Patient counseled to s/u with OB on Monday. Strict return precautions and anticipatory guidance given.             ED Course as of 09/11/22 1406   Sat Sep 10, 2022   2017 Mild leukocytosis noted.  Patient is afebrile.  She has no meningeal signs.  She has no abnormal behavior.  She has no other sinus a localized  infection.  Patient has a normocytic anemia but is consistent with her prior pregnancy. [DS]   2040 Urinalysis is without proteinuria.  No transaminitis.  Overall, presentation is not consistent with preeclampsia. [DS]      ED Course User Index  [DS] Reji Gandara MD             Clinical Impression:   Final diagnoses:  [R42] Dizziness  [R51.9] Acute nonintractable headache, unspecified headache type (Primary)        ED Disposition Condition    Discharge Stable          ED Prescriptions    None       Follow-up Information       Follow up With Specialties Details Why Contact Info    Mingo Santamaria MD Obstetrics and Gynecology, Obstetrics and Gynecology Call on 9/12/2022  [address]  [address]  [facility] [address]  [phone]               Reji Gandara MD  09/11/22 2622

## 2022-09-14 ENCOUNTER — PATIENT MESSAGE (OUTPATIENT)
Dept: ADMINISTRATIVE | Facility: OTHER | Age: 22
End: 2022-09-14
Payer: MEDICAID

## 2022-10-06 ENCOUNTER — CLINICAL SUPPORT (OUTPATIENT)
Dept: OBSTETRICS AND GYNECOLOGY | Facility: CLINIC | Age: 22
End: 2022-10-06
Payer: MEDICAID

## 2022-10-06 DIAGNOSIS — Z87.51 HISTORY OF PRE-TERM LABOR: Primary | ICD-10-CM

## 2022-10-06 PROCEDURE — 99999 PR PBB SHADOW E&M-EST. PATIENT-LVL I: CPT | Mod: PBBFAC,,,

## 2022-10-06 PROCEDURE — 96372 THER/PROPH/DIAG INJ SC/IM: CPT | Mod: PBBFAC

## 2022-10-06 PROCEDURE — 99999 PR PBB SHADOW E&M-EST. PATIENT-LVL I: ICD-10-PCS | Mod: PBBFAC,,,

## 2022-10-06 RX ADMIN — HYDROXYPROGESTERONE CAPROATE 250 MG: 250 INJECTION INTRAMUSCULAR at 09:10

## 2022-10-12 ENCOUNTER — HOSPITAL ENCOUNTER (INPATIENT)
Facility: HOSPITAL | Age: 22
LOS: 2 days | Discharge: HOME | DRG: 833 | End: 2022-10-14
Attending: OBSTETRICS & GYNECOLOGY | Admitting: OBSTETRICS & GYNECOLOGY
Payer: MEDICAID

## 2022-10-12 DIAGNOSIS — O47.00 PRETERM CONTRACTIONS: ICD-10-CM

## 2022-10-12 DIAGNOSIS — Z87.59 HISTORY OF PREGNANCY INDUCED HYPERTENSION: ICD-10-CM

## 2022-10-12 DIAGNOSIS — Z3A.29 29 WEEKS GESTATION OF PREGNANCY: Primary | ICD-10-CM

## 2022-10-12 DIAGNOSIS — O60.03 PRETERM LABOR IN THIRD TRIMESTER WITHOUT DELIVERY: ICD-10-CM

## 2022-10-12 LAB
ABO + RH BLD: NORMAL
ALBUMIN SERPL BCP-MCNC: 2.8 G/DL (ref 3.5–5.2)
ALP SERPL-CCNC: 97 U/L (ref 55–135)
ALT SERPL W/O P-5'-P-CCNC: 9 U/L (ref 10–44)
ANION GAP SERPL CALC-SCNC: 6 MMOL/L (ref 8–16)
AST SERPL-CCNC: 14 U/L (ref 10–40)
BACTERIA #/AREA URNS HPF: ABNORMAL /HPF
BASOPHILS # BLD AUTO: 0.02 K/UL (ref 0–0.2)
BASOPHILS NFR BLD: 0.2 % (ref 0–1.9)
BILIRUB SERPL-MCNC: 0.3 MG/DL (ref 0.1–1)
BILIRUB UR QL STRIP: NEGATIVE
BLD GP AB SCN CELLS X3 SERPL QL: NORMAL
BUN SERPL-MCNC: 6 MG/DL (ref 6–20)
CALCIUM SERPL-MCNC: 8.7 MG/DL (ref 8.7–10.5)
CHLORIDE SERPL-SCNC: 108 MMOL/L (ref 95–110)
CLARITY UR: CLEAR
CO2 SERPL-SCNC: 24 MMOL/L (ref 23–29)
COLOR UR: COLORLESS
CREAT SERPL-MCNC: 0.6 MG/DL (ref 0.5–1.4)
DIFFERENTIAL METHOD: ABNORMAL
EOSINOPHIL # BLD AUTO: 0.1 K/UL (ref 0–0.5)
EOSINOPHIL NFR BLD: 0.6 % (ref 0–8)
ERYTHROCYTE [DISTWIDTH] IN BLOOD BY AUTOMATED COUNT: 12.8 % (ref 11.5–14.5)
EST. GFR  (NO RACE VARIABLE): >60 ML/MIN/1.73 M^2
GLUCOSE SERPL-MCNC: 72 MG/DL (ref 70–110)
GLUCOSE UR QL STRIP: NEGATIVE
HCT VFR BLD AUTO: 32.7 % (ref 37–48.5)
HGB BLD-MCNC: 10.4 G/DL (ref 12–16)
HGB UR QL STRIP: NEGATIVE
IMM GRANULOCYTES # BLD AUTO: 0.06 K/UL (ref 0–0.04)
IMM GRANULOCYTES NFR BLD AUTO: 0.5 % (ref 0–0.5)
KETONES UR QL STRIP: NEGATIVE
LEUKOCYTE ESTERASE UR QL STRIP: ABNORMAL
LYMPHOCYTES # BLD AUTO: 2.4 K/UL (ref 1–4.8)
LYMPHOCYTES NFR BLD: 18.8 % (ref 18–48)
MCH RBC QN AUTO: 27.6 PG (ref 27–31)
MCHC RBC AUTO-ENTMCNC: 31.8 G/DL (ref 32–36)
MCV RBC AUTO: 87 FL (ref 82–98)
MICROSCOPIC COMMENT: ABNORMAL
MONOCYTES # BLD AUTO: 0.8 K/UL (ref 0.3–1)
MONOCYTES NFR BLD: 5.9 % (ref 4–15)
NEUTROPHILS # BLD AUTO: 9.5 K/UL (ref 1.8–7.7)
NEUTROPHILS NFR BLD: 74 % (ref 38–73)
NITRITE UR QL STRIP: NEGATIVE
NRBC BLD-RTO: 0 /100 WBC
PH UR STRIP: 8 [PH] (ref 5–8)
PLATELET # BLD AUTO: 237 K/UL (ref 150–450)
PMV BLD AUTO: 10.9 FL (ref 9.2–12.9)
POTASSIUM SERPL-SCNC: 3.7 MMOL/L (ref 3.5–5.1)
PROT SERPL-MCNC: 7.5 G/DL (ref 6–8.4)
PROT UR QL STRIP: NEGATIVE
RBC # BLD AUTO: 3.77 M/UL (ref 4–5.4)
SARS-COV-2 RDRP RESP QL NAA+PROBE: NEGATIVE
SODIUM SERPL-SCNC: 138 MMOL/L (ref 136–145)
SP GR UR STRIP: 1 (ref 1–1.03)
SQUAMOUS #/AREA URNS HPF: 1 /HPF
URN SPEC COLLECT METH UR: ABNORMAL
UROBILINOGEN UR STRIP-ACNC: NEGATIVE EU/DL
WBC # BLD AUTO: 12.86 K/UL (ref 3.9–12.7)
WBC #/AREA URNS HPF: 14 /HPF (ref 0–5)

## 2022-10-12 PROCEDURE — 72100003 HC LABOR CARE, EA. ADDL. 8 HRS

## 2022-10-12 PROCEDURE — 11000001 HC ACUTE MED/SURG PRIVATE ROOM

## 2022-10-12 PROCEDURE — 72100002 HC LABOR CARE, 1ST 8 HOURS

## 2022-10-12 PROCEDURE — 81000 URINALYSIS NONAUTO W/SCOPE: CPT | Performed by: OBSTETRICS & GYNECOLOGY

## 2022-10-12 PROCEDURE — U0002 COVID-19 LAB TEST NON-CDC: HCPCS | Performed by: OBSTETRICS & GYNECOLOGY

## 2022-10-12 PROCEDURE — 80053 COMPREHEN METABOLIC PANEL: CPT | Performed by: OBSTETRICS & GYNECOLOGY

## 2022-10-12 PROCEDURE — 59025 FETAL NON-STRESS TEST: CPT

## 2022-10-12 PROCEDURE — 63600175 PHARM REV CODE 636 W HCPCS: Performed by: OBSTETRICS & GYNECOLOGY

## 2022-10-12 PROCEDURE — 99211 OFF/OP EST MAY X REQ PHY/QHP: CPT | Mod: 25

## 2022-10-12 PROCEDURE — 86592 SYPHILIS TEST NON-TREP QUAL: CPT | Performed by: OBSTETRICS & GYNECOLOGY

## 2022-10-12 PROCEDURE — 86850 RBC ANTIBODY SCREEN: CPT | Performed by: OBSTETRICS & GYNECOLOGY

## 2022-10-12 PROCEDURE — 25000003 PHARM REV CODE 250: Performed by: OBSTETRICS & GYNECOLOGY

## 2022-10-12 PROCEDURE — 51702 INSERT TEMP BLADDER CATH: CPT

## 2022-10-12 PROCEDURE — 85025 COMPLETE CBC W/AUTO DIFF WBC: CPT | Performed by: OBSTETRICS & GYNECOLOGY

## 2022-10-12 RX ORDER — MAGNESIUM SULFATE HEPTAHYDRATE 40 MG/ML
6 INJECTION, SOLUTION INTRAVENOUS ONCE
Status: COMPLETED | OUTPATIENT
Start: 2022-10-12 | End: 2022-10-12

## 2022-10-12 RX ORDER — MAGNESIUM SULFATE HEPTAHYDRATE 40 MG/ML
2 INJECTION, SOLUTION INTRAVENOUS CONTINUOUS
Status: DISCONTINUED | OUTPATIENT
Start: 2022-10-12 | End: 2022-10-14 | Stop reason: HOSPADM

## 2022-10-12 RX ORDER — BETAMETHASONE SODIUM PHOSPHATE AND BETAMETHASONE ACETATE 3; 3 MG/ML; MG/ML
12 INJECTION, SUSPENSION INTRA-ARTICULAR; INTRALESIONAL; INTRAMUSCULAR; SOFT TISSUE EVERY 24 HOURS
Status: COMPLETED | OUTPATIENT
Start: 2022-10-12 | End: 2022-10-13

## 2022-10-12 RX ORDER — BUTORPHANOL TARTRATE 1 MG/ML
1 INJECTION INTRAMUSCULAR; INTRAVENOUS ONCE
Status: COMPLETED | OUTPATIENT
Start: 2022-10-12 | End: 2022-10-12

## 2022-10-12 RX ORDER — SODIUM CHLORIDE 0.9 % (FLUSH) 0.9 %
10 SYRINGE (ML) INJECTION
Status: DISCONTINUED | OUTPATIENT
Start: 2022-10-12 | End: 2022-10-14 | Stop reason: HOSPADM

## 2022-10-12 RX ADMIN — AMPICILLIN 2 G: 2 INJECTION, POWDER, FOR SOLUTION INTRAVENOUS at 11:10

## 2022-10-12 RX ADMIN — BETAMETHASONE SODIUM PHOSPHATE AND BETAMETHASONE ACETATE 12 MG: 3; 3 INJECTION, SUSPENSION INTRA-ARTICULAR; INTRALESIONAL; INTRAMUSCULAR at 09:10

## 2022-10-12 RX ADMIN — AMPICILLIN 2 G: 2 INJECTION, POWDER, FOR SOLUTION INTRAVENOUS at 09:10

## 2022-10-12 RX ADMIN — MAGNESIUM SULFATE HEPTAHYDRATE 6 G: 40 INJECTION, SOLUTION INTRAVENOUS at 09:10

## 2022-10-12 RX ADMIN — BUTORPHANOL TARTRATE 1 MG: 1 INJECTION, SOLUTION INTRAMUSCULAR; INTRAVENOUS at 01:10

## 2022-10-12 RX ADMIN — MAGNESIUM SULFATE IN WATER 2 G/HR: 40 INJECTION, SOLUTION INTRAVENOUS at 10:10

## 2022-10-12 RX ADMIN — AMPICILLIN 2 G: 2 INJECTION, POWDER, FOR SOLUTION INTRAVENOUS at 05:10

## 2022-10-12 NOTE — H&P
[facility] - [place] - Labor & Delivery  Obstetrics  History & Physical    Patient Name: Fiona Le  MRN: [id number]  Admission Date: 10/12/2022  Primary Care Provider: Charles Hein MD    Subjective:     Principal Problem:29 weeks gestation of pregnancy    History of Present Illness:  23 yo  at 29w0d  History of  delivery at 34 weeks  On [place] weekly  History of PIH.  On baby aspirin    Came in this morning, 10/12/22 with contractions since 0600.  No vaginal bleeding or rupture of membranes  Good fetal movements        Obstetric HPI:  Patient reports regular contractions, active fetal movement, No vaginal bleeding , No loss of fluid     This pregnancy has been complicated by   1.  History of  delivery  2. History of pregnancy-induced hypertension  Now in  labor    OB History    Para Term  AB Living   3 2 1 1 0 2   SAB IAB Ectopic Multiple Live Births   0 0 0 0 2      # Outcome Date GA Lbr Nahid/2nd Weight Sex Delivery Anes PTL Lv   3 Current            2  21 34w1d 01:18 / 00:04 1.82 kg (4 lb 0.2 oz) F Vag-Spont EPI Y DOUG      Name: ADELAIDA,HUAN JACKSON      Apgar1: 9  Apgar5: 9   1 Term 16 38w5d  3.738 kg (8 lb 3.9 oz) M Vag-Vacuum EPI N DOUG      Apgar1: 7  Apgar5: 9     Past Medical History:   Diagnosis Date    Asthma     Hypertension affecting pregnancy in third trimester 2021     History reviewed. No pertinent surgical history.    Facility-Administered Medications Prior to Admission   Medication    HYDROXYprogesterone (DARRON) injection 250 mg     PTA Medications   Medication Sig    acetaminophen (TYLENOL) 500 MG tablet Take 2 tablets (1,000 mg total) by mouth every 6 (six) hours as needed for Pain.    aspirin (ECOTRIN) 81 MG EC tablet Take 1 tablet (81 mg total) by mouth once daily.    fluticasone propionate (FLONASE) 50 mcg/actuation nasal spray 1 spray (50 mcg total) by Each Nostril route 2 (two) times daily as needed.    promethazine  (PHENERGAN) 25 MG tablet Take 1 tablet (25 mg total) by mouth every 6 (six) hours as needed for Nausea. (Patient not taking: Reported on 8/25/2022)     27 mg iron- 1 mg Tab Take 1 tablet by mouth once daily.       Review of patient's allergies indicates:   Allergen Reactions    Fish containing products Rash        Family History       Problem Relation (Age of Onset)    Diabetes Maternal Grandfather    Hypertension Mother, Maternal Grandmother    Miscarriages / Stillbirths Mother    Seizures Brother    Stroke Maternal Grandfather          Tobacco Use    Smoking status: Never    Smokeless tobacco: Never   Substance and Sexual Activity    Alcohol use: Not Currently    Drug use: Yes     Types: Marijuana    Sexual activity: Yes     Partners: Male     Birth control/protection: None     Review of Systems   Constitutional:  Positive for fatigue. Negative for activity change, appetite change, fever and unexpected weight change.   Respiratory:  Negative for cough, shortness of breath and wheezing.    Cardiovascular:  Negative for chest pain and palpitations.   Gastrointestinal:  Positive for abdominal pain and nausea. Negative for vomiting.   Endocrine: Negative for hot flashes.   Genitourinary:  Positive for frequency, pelvic pain and vaginal discharge. Negative for dysmenorrhea, dyspareunia, urgency, vaginal bleeding and postcoital bleeding.   Musculoskeletal:  Positive for back pain. Negative for myalgias.   Integumentary:  Negative for rash, breast mass and nipple discharge.   Neurological:  Positive for headaches. Negative for seizures.   Psychiatric/Behavioral:  Negative for depression and sleep disturbance. The patient is not nervous/anxious.    Breast: Negative for mass, mastodynia and nipple discharge   Objective:     Vital Signs (Most Recent):    Vital Signs (24h Range):           There is no height or weight on file to calculate BMI.    FHT: 140-150 Cat 1 (reassuring)  TOCO:  Q 2-5 minutes    Physical  Exam:   Constitutional: She appears well-developed and well-nourished. No distress.    HENT:   Head: Normocephalic and atraumatic.    Eyes: EOM are normal.     Cardiovascular:  Normal rate.             Pulmonary/Chest: Effort normal. No respiratory distress.        Abdominal: Soft. She exhibits no distension. There is no abdominal tenderness. There is no rebound and no guarding.   Gravid             Musculoskeletal: Normal range of motion.       Neurological: She is alert.    Skin: Skin is warm and dry.    Psychiatric: She has a normal mood and affect.     Cervix:  Dilation:  3       Significant Labs:  Lab Results   Component Value Date    GROUPTRH B POS 2022    HEPBSAG Negative 2022    STREPBCULT No Group B Streptococcus isolated 2015       I have personallly reviewed all pertinent lab results from the last 24 hours.    Assessment/Plan:     22 y.o. female  at 29w0d for:    * 29 weeks gestation of pregnancy  In  labor with regular contractions and cervical dilatation  Admit  Magnesium IV  Celestone  Ampicillin  OB ultrasound  Neonatology consult     labor in third trimester without delivery  In  labor with regular contractions and cervical dilatation  Admit  Magnesium IV  Celestone  Ampicillin  OB ultrasound  Neonatology consult    History of pregnancy induced hypertension  In  labor with regular contractions and cervical dilatation  Admit  Magnesium IV  Celestone  Ampicillin  OB ultrasound  Neonatology consult        Mingo Santamaria MD  Obstetrics  [facility] [place] - Labor & Delivery

## 2022-10-12 NOTE — NURSING
0834-Pt presents to triage with c/o contractions q2min that started at 6am and constant rectal pressure.  Pt has h/o PTD.  SVE done 3.5/70/-2.  No vaginal bleeding or LOF and pt reports good FM.  H&P reviewed and pt on monitor.     1509-RN call to MD Santamaria.  Report given and orders received to admit, start Mag 6g/2g, amp 2g q6hrs, bmz (see orders).      0928-MD Santamaria on unit to see pt.  Additional orders received for US for presentation and EFW.      1150-Report given to MD Solano.     1300-RN notified MD Santamaria of pt c/o pain.  MD gave order for 1mg IVPush stadol.

## 2022-10-12 NOTE — CONSULTS
10/12/2022   Fiona GARVIN Rey   [id number]     Consulted by Obstetrician to  mom about prematurity.    Mother's history reviewed (including H&P and progress notes).  ________________________________________  22 y.o. female  at 29w0d for:     * 29 weeks gestation of pregnancy  In  labor with regular contractions and cervical dilatation  Admit  Magnesium IV  Celestone  Ampicillin  OB ultrasound  Neonatology consult      labor in third trimester without delivery  In  labor with regular contractions and cervical dilatation       History of pregnancy induced hypertension  In  labor with regular contractions and cervical dilatation    ________________________________________  Above copied from OB notes.    Talked to mom about prematurity and 29 wks GA baby.  I discussed in detail about possible complications, need for ventilator b/c of immature lungs and IV antibiotics for possible sepsis.   Discussed increased morbidity and mortality as compared to term babies.  Discussed developmental concerns and stay in NICU till near due date.   Provided information about NICU.    Mom verbalized understanding.    Please feel free to call us if mom has any other questions.     Thanks for the consult.    Tamir Solano MD,  [facility]

## 2022-10-12 NOTE — LACTATION NOTE
"Pt seen to discuss feeding plan for infant -states plans to breastfeed and had a baby in NICU last year so she "knows about pumping" -provided an NICU breastfeeding guide for reinforcement of milk expression information     "

## 2022-10-12 NOTE — LETTER
October 14, 2022       [place] [address]  Phone: [phone]         Fiona Le  [facility] [facility] [place] [address]        Fiona Le    Was treated here on 10/12/2022 - 10/14/2022                Sincerely,    Barby Echeverria RN

## 2022-10-12 NOTE — SUBJECTIVE & OBJECTIVE
Obstetric HPI:  Patient reports regular contractions, active fetal movement, No vaginal bleeding , No loss of fluid     This pregnancy has been complicated by    History of  delivery  History of pregnancy-induced hypertension  Now in  labor    OB History    Para Term  AB Living   3 2 1 1 0 2   SAB IAB Ectopic Multiple Live Births   0 0 0 0 2      # Outcome Date GA Lbr Nahid/2nd Weight Sex Delivery Anes PTL Lv   3 Current            2  21 34w1d 01:18 / 00:04 1.82 kg (4 lb 0.2 oz) F Vag-Spont EPI Y DOUG      Name: HUAN SON      Apgar1: 9  Apgar5: 9   1 Term 16 38w5d  3.738 kg (8 lb 3.9 oz) M Vag-Vacuum EPI N DOUG      Apgar1: 7  Apgar5: 9     Past Medical History:   Diagnosis Date    Asthma     Hypertension affecting pregnancy in third trimester 2021     History reviewed. No pertinent surgical history.    Facility-Administered Medications Prior to Admission   Medication    HYDROXYprogesterone (DARRON) injection 250 mg     PTA Medications   Medication Sig    acetaminophen (TYLENOL) 500 MG tablet Take 2 tablets (1,000 mg total) by mouth every 6 (six) hours as needed for Pain.    aspirin (ECOTRIN) 81 MG EC tablet Take 1 tablet (81 mg total) by mouth once daily.    fluticasone propionate (FLONASE) 50 mcg/actuation nasal spray 1 spray (50 mcg total) by Each Nostril route 2 (two) times daily as needed.    promethazine (PHENERGAN) 25 MG tablet Take 1 tablet (25 mg total) by mouth every 6 (six) hours as needed for Nausea. (Patient not taking: Reported on 2022)     27 mg iron- 1 mg Tab Take 1 tablet by mouth once daily.       Review of patient's allergies indicates:   Allergen Reactions    Fish containing products Rash        Family History       Problem Relation (Age of Onset)    Diabetes Maternal Grandfather    Hypertension Mother, Maternal Grandmother    Miscarriages / Stillbirths Mother    Seizures Brother    Stroke Maternal Grandfather          Tobacco  Use    Smoking status: Never    Smokeless tobacco: Never   Substance and Sexual Activity    Alcohol use: Not Currently    Drug use: Yes     Types: Marijuana    Sexual activity: Yes     Partners: Male     Birth control/protection: None     Review of Systems   Constitutional:  Positive for fatigue. Negative for activity change, appetite change, fever and unexpected weight change.   Respiratory:  Negative for cough, shortness of breath and wheezing.    Cardiovascular:  Negative for chest pain and palpitations.   Gastrointestinal:  Positive for abdominal pain and nausea. Negative for vomiting.   Endocrine: Negative for hot flashes.   Genitourinary:  Positive for frequency, pelvic pain and vaginal discharge. Negative for dysmenorrhea, dyspareunia, urgency, vaginal bleeding and postcoital bleeding.   Musculoskeletal:  Positive for back pain. Negative for myalgias.   Integumentary:  Negative for rash, breast mass and nipple discharge.   Neurological:  Positive for headaches. Negative for seizures.   Psychiatric/Behavioral:  Negative for depression and sleep disturbance. The patient is not nervous/anxious.    Breast: Negative for mass, mastodynia and nipple discharge   Objective:     Vital Signs (Most Recent):    Vital Signs (24h Range):           There is no height or weight on file to calculate BMI.    FHT: 140-150 Cat 1 (reassuring)  TOCO:  Q 2-5 minutes    Physical Exam:   Constitutional: She appears well-developed and well-nourished. No distress.    HENT:   Head: Normocephalic and atraumatic.    Eyes: EOM are normal.     Cardiovascular:  Normal rate.             Pulmonary/Chest: Effort normal. No respiratory distress.        Abdominal: Soft. She exhibits no distension. There is no abdominal tenderness. There is no rebound and no guarding.   Gravid             Musculoskeletal: Normal range of motion.       Neurological: She is alert.    Skin: Skin is warm and dry.    Psychiatric: She has a normal mood and affect.      Cervix:  Dilation:  3       Significant Labs:  Lab Results   Component Value Date    GROUPTRH B POS 05/18/2022    HEPBSAG Negative 05/18/2022    STREPBCULT No Group B Streptococcus isolated 12/17/2015       I have personallly reviewed all pertinent lab results from the last 24 hours.

## 2022-10-12 NOTE — HPI
23 yo  at 29w0d  History of  delivery at 34 weeks  On [place] weekly  History of PIH.  On baby aspirin    Came in this morning, 10/12/22 with contractions since 0600.  No vaginal bleeding or rupture of membranes  Good fetal movements

## 2022-10-13 LAB — RPR SER QL: NORMAL

## 2022-10-13 PROCEDURE — 11000001 HC ACUTE MED/SURG PRIVATE ROOM

## 2022-10-13 PROCEDURE — 99232 SBSQ HOSP IP/OBS MODERATE 35: CPT | Mod: 25,,, | Performed by: OBSTETRICS & GYNECOLOGY

## 2022-10-13 PROCEDURE — 72100003 HC LABOR CARE, EA. ADDL. 8 HRS

## 2022-10-13 PROCEDURE — 25000003 PHARM REV CODE 250: Performed by: OBSTETRICS & GYNECOLOGY

## 2022-10-13 PROCEDURE — 59025 PR FETAL 2N-STRESS TEST: ICD-10-PCS | Mod: 26,,, | Performed by: OBSTETRICS & GYNECOLOGY

## 2022-10-13 PROCEDURE — 63600175 PHARM REV CODE 636 W HCPCS: Performed by: OBSTETRICS & GYNECOLOGY

## 2022-10-13 PROCEDURE — 99232 PR SUBSEQUENT HOSPITAL CARE,LEVL II: ICD-10-PCS | Mod: 25,,, | Performed by: OBSTETRICS & GYNECOLOGY

## 2022-10-13 PROCEDURE — 59025 FETAL NON-STRESS TEST: CPT | Mod: 26,,, | Performed by: OBSTETRICS & GYNECOLOGY

## 2022-10-13 RX ORDER — ACETAMINOPHEN 325 MG/1
650 TABLET ORAL EVERY 6 HOURS PRN
Status: DISCONTINUED | OUTPATIENT
Start: 2022-10-13 | End: 2022-10-14 | Stop reason: HOSPADM

## 2022-10-13 RX ADMIN — AMPICILLIN 2 G: 2 INJECTION, POWDER, FOR SOLUTION INTRAVENOUS at 10:10

## 2022-10-13 RX ADMIN — ACETAMINOPHEN 650 MG: 325 TABLET ORAL at 10:10

## 2022-10-13 RX ADMIN — MAGNESIUM SULFATE IN WATER 2 G/HR: 40 INJECTION, SOLUTION INTRAVENOUS at 02:10

## 2022-10-13 RX ADMIN — AMPICILLIN 2 G: 2 INJECTION, POWDER, FOR SOLUTION INTRAVENOUS at 05:10

## 2022-10-13 RX ADMIN — AMPICILLIN 2 G: 2 INJECTION, POWDER, FOR SOLUTION INTRAVENOUS at 11:10

## 2022-10-13 RX ADMIN — MAGNESIUM SULFATE IN WATER 2 G/HR: 40 INJECTION, SOLUTION INTRAVENOUS at 10:10

## 2022-10-13 RX ADMIN — BETAMETHASONE SODIUM PHOSPHATE AND BETAMETHASONE ACETATE 12 MG: 3; 3 INJECTION, SUSPENSION INTRA-ARTICULAR; INTRALESIONAL; INTRAMUSCULAR at 09:10

## 2022-10-13 NOTE — SUBJECTIVE & OBJECTIVE
Obstetric HPI:  Patient reports occasional/rare contractions, active fetal movement, absent vaginal bleeding , absent loss of fluid      History of  delivery. Has been on [place] weekly      Objective:     Vital Signs (Most Recent):  Temp: 98.6 °F (37 °C) (10/13/22 0536)  Pulse: 84 (10/13/22 0537)  Resp: 16 (10/13/22 0536)  BP: (!) 112/58 (10/13/22 0536)  SpO2: 99 % (10/13/22 0445)   Vital Signs (24h Range):  Temp:  [98 °F (36.7 °C)-98.6 °F (37 °C)] 98.6 °F (37 °C)  Pulse:  [] 84  Resp:  [16-20] 16  SpO2:  [92 %-100 %] 99 %  BP: ()/(56-81) 112/58     Weight: 71.7 kg (158 lb)  Body mass index is 28.9 kg/m².    FHT: 130 Cat 1 (reassuring)  TOCO:  Rare contractions      Intake/Output Summary (Last 24 hours) at 10/13/2022 0816  Last data filed at 10/13/2022 0729  Gross per 24 hour   Intake --   Output 4253 ml   Net -4253 ml       Cervical Exam:  Dilation:  4 cm per Nursing staff     Significant Labs:  Recent Lab Results         10/12/22  0933   10/12/22  0905   10/12/22  0855        Albumin   2.8         Alkaline Phosphatase   97         ALT   9         Anion Gap   6         Appearance, UA     Clear       AST   14         Bacteria, UA     Rare       Baso #   0.02         Basophil %   0.2         Bilirubin (UA)     Negative       BILIRUBIN TOTAL   0.3  Comment: For infants and newborns, interpretation of results should be based  on gestational age, weight and in agreement with clinical  observations.    Premature Infant recommended reference ranges:  Up to 24 hours.............<8.0 mg/dL  Up to 48 hours............<12.0 mg/dL  3-5 days..................<15.0 mg/dL  6-29 days.................<15.0 mg/dL           BUN   6         Calcium   8.7         Chloride   108         CO2   24         Color, UA     Colorless       Creatinine   0.6         Differential Method   Automated         eGFR   >60         Eos #   0.1         Eosinophil %   0.6         Glucose   72         Glucose, UA     Negative       Gran  # (ANC)   9.5         Gran %   74.0         Group & Rh   B POS         Hematocrit   32.7         Hemoglobin   10.4         Immature Grans (Abs)   0.06  Comment: Mild elevation in immature granulocytes is non specific and   can be seen in a variety of conditions including stress response,   acute inflammation, trauma and pregnancy. Correlation with other   laboratory and clinical findings is essential.           Immature Granulocytes   0.5         INDIRECT JACQUELINE   NEG         Ketones, UA     Negative       Leukocytes, UA     3+       Lymph #   2.4         Lymph %   18.8         MCH   27.6         MCHC   31.8         MCV   87         Microscopic Comment     SEE COMMENT  Comment: Other formed elements not mentioned in the report are not   present in the microscopic examination.          Mono #   0.8         Mono %   5.9         MPV   10.9         NITRITE UA     Negative       nRBC   0         Occult Blood UA     Negative       pH, UA     8.0       Platelets   237         Potassium   3.7         PROTEIN TOTAL   7.5         Protein, UA     Negative  Comment: Recommend a 24 hour urine protein or a urine   protein/creatinine ratio if globulin induced proteinuria is  clinically suspected.         RBC   3.77         RDW   12.8         SARS-CoV-2 RNA, Amplification, Qual Negative  Comment: This test utilizes isothermal nucleic acid amplification technology   to   detect the SARS-CoV-2 RdRp nucleic acid segment. The analytical   sensitivity   (limit of detection) is 500 copies/swab.     A POSITIVE result is indicative of the presence of SARS-CoV-2 RNA;   clinical   correlation with patient history and other diagnostic information is   necessary to determine patient infection status.    A NEGATIVE result means that SARS-CoV-2 nucleic acids are not present   above   the limit of detection. A NEGATIVE result should be treated as   presumptive.   It does not rule out the possibility of COVID-19 and should not be   the sole    basis for treatment decisions. If COVID-19 is strongly suspected   based on   clinical and exposure history, re-testing using an alternate   molecular assay   should be considered.     This test is only for use under the Food and Drug Administration s   Emergency   Use Authorization (EUA).     Commercial kits are provided by [employer]. Performance   characteristics of the EUA have been independently verified by   [facility] Department of Pathology and Laboratory Medicine.   _________________________________________________________________   The authorized Fact Sheet for Healthcare Providers and the authorized   Fact   Sheet for Patients of the ID NOW COVID-19 are available on the FDA   website:   https://www.fda.gov/media/925341/download   https://www.fda.gov/media/473456/download             Sodium   138         Specific [place], UA     1.005       Specimen UA     Urine, Clean Catch       Squam Epithel, UA     1       UROBILINOGEN UA     Negative       WBC, UA     14       WBC   12.86                 Physical Exam:   Constitutional: She appears well-developed and well-nourished. No distress.    HENT:   Head: Normocephalic and atraumatic.    Eyes: EOM are normal.     Cardiovascular:  Normal rate.             Pulmonary/Chest: Effort normal. No respiratory distress.        Abdominal: Soft. She exhibits no distension. There is no abdominal tenderness. There is no rebound and no guarding.   Gravid             Musculoskeletal: Normal range of motion.       Neurological: She is alert.    Skin: Skin is warm and dry.    Psychiatric: She has a normal mood and affect.

## 2022-10-13 NOTE — PROGRESS NOTES
[facility] - Labor & Delivery  Obstetrics  Antepartum Progress Note    Patient Name: Fiona Le  MRN: [id number]  Admission Date: 10/12/2022  Hospital Length of Stay: 1 days  Attending Physician: Mingo Santamaria MD  Primary Care Provider: Charles Hein MD    Subjective:     Principal Problem:29 weeks gestation of pregnancy    HPI:  23 yo  at 29w0d  History of  delivery at 34 weeks  On Dora weekly  History of PIH.  On baby aspirin    Came in this morning, 10/12/22 with contractions since 0600.  No vaginal bleeding or rupture of membranes  Good fetal movements        Hospital Course:  On Labor and Delivery, cervix at 3 cm  Admitted for management of  labor    10/13/2022 Rare contractions now.  On Magnesium tocolytic therapy.  Got IV sedation yesterday.  Status post betamethasone #1 yesterday.  Next dose at 0930 today.  On Amp for unknown GBS      Obstetric HPI:  Patient reports occasional/rare contractions, active fetal movement, absent vaginal bleeding , absent loss of fluid      History of  delivery. Has been on Dora weekly      Objective:     Vital Signs (Most Recent):  Temp: 98.6 °F (37 °C) (10/13/22 0536)  Pulse: 84 (10/13/22 0537)  Resp: 16 (10/13/22 0536)  BP: (!) 112/58 (10/13/22 0536)  SpO2: 99 % (10/13/22 0445)   Vital Signs (24h Range):  Temp:  [98 °F (36.7 °C)-98.6 °F (37 °C)] 98.6 °F (37 °C)  Pulse:  [] 84  Resp:  [16-20] 16  SpO2:  [92 %-100 %] 99 %  BP: ()/(56-81) 112/58     Weight: 71.7 kg (158 lb)  Body mass index is 28.9 kg/m².    FHT: 130 Cat 1 (reassuring)  TOCO:  Rare contractions      Intake/Output Summary (Last 24 hours) at 10/13/2022 0816  Last data filed at 10/13/2022 0729  Gross per 24 hour   Intake --   Output 4253 ml   Net -4253 ml       Cervical Exam:  Dilation:  4 cm per Nursing staff     Significant Labs:  Recent Lab Results         10/12/22  0933   10/12/22  0905   10/12/22  0855        Albumin   2.8         Alkaline Phosphatase   97          ALT   9         Anion Gap   6         Appearance, UA     Clear       AST   14         Bacteria, UA     Rare       Baso #   0.02         Basophil %   0.2         Bilirubin (UA)     Negative       BILIRUBIN TOTAL   0.3  Comment: For infants and newborns, interpretation of results should be based  on gestational age, weight and in agreement with clinical  observations.    Premature Infant recommended reference ranges:  Up to 24 hours.............<8.0 mg/dL  Up to 48 hours............<12.0 mg/dL  3-5 days..................<15.0 mg/dL  6-29 days.................<15.0 mg/dL           BUN   6         Calcium   8.7         Chloride   108         CO2   24         Color, UA     Colorless       Creatinine   0.6         Differential Method   Automated         eGFR   >60         Eos #   0.1         Eosinophil %   0.6         Glucose   72         Glucose, UA     Negative       Gran # (ANC)   9.5         Gran %   74.0         Group & Rh   B POS         Hematocrit   32.7         Hemoglobin   10.4         Immature Grans (Abs)   0.06  Comment: Mild elevation in immature granulocytes is non specific and   can be seen in a variety of conditions including stress response,   acute inflammation, trauma and pregnancy. Correlation with other   laboratory and clinical findings is essential.           Immature Granulocytes   0.5         INDIRECT JACQUELINE   NEG         Ketones, UA     Negative       Leukocytes, UA     3+       Lymph #   2.4         Lymph %   18.8         MCH   27.6         MCHC   31.8         MCV   87         Microscopic Comment     SEE COMMENT  Comment: Other formed elements not mentioned in the report are not   present in the microscopic examination.          Mono #   0.8         Mono %   5.9         MPV   10.9         NITRITE UA     Negative       nRBC   0         Occult Blood UA     Negative       pH, UA     8.0       Platelets   237         Potassium   3.7         PROTEIN TOTAL   7.5         Protein, UA      Negative  Comment: Recommend a 24 hour urine protein or a urine   protein/creatinine ratio if globulin induced proteinuria is  clinically suspected.         RBC   3.77         RDW   12.8         SARS-CoV-2 RNA, Amplification, Qual Negative  Comment: This test utilizes isothermal nucleic acid amplification technology   to   detect the SARS-CoV-2 RdRp nucleic acid segment. The analytical   sensitivity   (limit of detection) is 500 copies/swab.     A POSITIVE result is indicative of the presence of SARS-CoV-2 RNA;   clinical   correlation with patient history and other diagnostic information is   necessary to determine patient infection status.    A NEGATIVE result means that SARS-CoV-2 nucleic acids are not present   above   the limit of detection. A NEGATIVE result should be treated as   presumptive.   It does not rule out the possibility of COVID-19 and should not be   the sole   basis for treatment decisions. If COVID-19 is strongly suspected   based on   clinical and exposure history, re-testing using an alternate   molecular assay   should be considered.     This test is only for use under the Food and Drug Administration s   Emergency   Use Authorization (EUA).     Commercial kits are provided by [employer]. Performance   characteristics of the EUA have been independently verified by   [facility] Department of Pathology and Laboratory Medicine.   _________________________________________________________________   The authorized Fact Sheet for Healthcare Providers and the authorized   Fact   Sheet for Patients of the ID NOW COVID-19 are available on the FDA   website:   https://www.fda.gov/media/865614/download   https://www.fda.gov/media/230932/download             Sodium   138         Specific Tebbetts, UA     1.005       Specimen UA     Urine, Clean Catch       Squam Epithel, UA     1       UROBILINOGEN UA     Negative       WBC, UA     14       WBC   12.86                 Physical Exam:    Constitutional: She appears well-developed and well-nourished. No distress.    HENT:   Head: Normocephalic and atraumatic.    Eyes: EOM are normal.     Cardiovascular:  Normal rate.             Pulmonary/Chest: Effort normal. No respiratory distress.        Abdominal: Soft. She exhibits no distension. There is no abdominal tenderness. There is no rebound and no guarding.   Gravid             Musculoskeletal: Normal range of motion.       Neurological: She is alert.    Skin: Skin is warm and dry.    Psychiatric: She has a normal mood and affect.     Assessment/Plan:     22 y.o. female  at 29w1d for:    * 29 weeks gestation of pregnancy  In  labor with regular contractions and cervical dilatation  Admit  Magnesium IV  Celestone x 2  Ampicillin  OB ultrasound done  Neonatology consult done    Will stop Mag 24 hours after [place] #2  If no cervical change, consider discharge home     labor in third trimester without delivery  In  labor with regular contractions and cervical dilatation  Admit  Magnesium IV  Celestone x 2  Ampicillin  OB ultrasound done  Neonatology consult done    Will stop Mag 24 hours after [place] #2  If no cervical change, consider discharge home    History of pregnancy induced hypertension  In  labor with regular contractions and cervical dilatation  Admit  Magnesium IV  Celestone x 2  Ampicillin  OB ultrasound done  Neonatology consult done    Will stop Mag 24 hours after Celestone #2  If no cervical change, consider discharge home          Mingo Santamaria MD  Obstetrics  [facility] - Labor & Delivery

## 2022-10-14 ENCOUNTER — PATIENT MESSAGE (OUTPATIENT)
Dept: OBSTETRICS AND GYNECOLOGY | Facility: CLINIC | Age: 22
End: 2022-10-14

## 2022-10-14 ENCOUNTER — CLINICAL SUPPORT (OUTPATIENT)
Dept: OBSTETRICS AND GYNECOLOGY | Facility: CLINIC | Age: 22
DRG: 833 | End: 2022-10-14
Payer: MEDICAID

## 2022-10-14 VITALS
SYSTOLIC BLOOD PRESSURE: 117 MMHG | RESPIRATION RATE: 18 BRPM | TEMPERATURE: 98 F | BODY MASS INDEX: 29.08 KG/M2 | HEART RATE: 97 BPM | DIASTOLIC BLOOD PRESSURE: 66 MMHG | OXYGEN SATURATION: 100 % | WEIGHT: 158 LBS | HEIGHT: 62 IN

## 2022-10-14 DIAGNOSIS — Z87.51 HISTORY OF PRETERM LABOR: Primary | ICD-10-CM

## 2022-10-14 PROCEDURE — 59025 PR FETAL 2N-STRESS TEST: ICD-10-PCS | Mod: 26,,, | Performed by: OBSTETRICS & GYNECOLOGY

## 2022-10-14 PROCEDURE — 96372 THER/PROPH/DIAG INJ SC/IM: CPT | Mod: PBBFAC

## 2022-10-14 PROCEDURE — 99232 PR SUBSEQUENT HOSPITAL CARE,LEVL II: ICD-10-PCS | Mod: 25,,, | Performed by: OBSTETRICS & GYNECOLOGY

## 2022-10-14 PROCEDURE — 99232 SBSQ HOSP IP/OBS MODERATE 35: CPT | Mod: 25,,, | Performed by: OBSTETRICS & GYNECOLOGY

## 2022-10-14 PROCEDURE — 99999 PR PBB SHADOW E&M-EST. PATIENT-LVL I: ICD-10-PCS | Mod: PBBFAC,,,

## 2022-10-14 PROCEDURE — 63600175 PHARM REV CODE 636 W HCPCS: Performed by: OBSTETRICS & GYNECOLOGY

## 2022-10-14 PROCEDURE — 99999 PR PBB SHADOW E&M-EST. PATIENT-LVL I: CPT | Mod: PBBFAC,,,

## 2022-10-14 PROCEDURE — 72100003 HC LABOR CARE, EA. ADDL. 8 HRS: Performed by: OBSTETRICS & GYNECOLOGY

## 2022-10-14 PROCEDURE — 59025 FETAL NON-STRESS TEST: CPT | Mod: 26,,, | Performed by: OBSTETRICS & GYNECOLOGY

## 2022-10-14 RX ADMIN — AMPICILLIN 2 G: 2 INJECTION, POWDER, FOR SOLUTION INTRAVENOUS at 04:10

## 2022-10-14 RX ADMIN — HYDROXYPROGESTERONE CAPROATE 250 MG: 250 INJECTION INTRAMUSCULAR at 01:10

## 2022-10-14 NOTE — SUBJECTIVE & OBJECTIVE
Obstetric HPI:  Patient reports None contractions, active fetal movement, absent vaginal bleeding , absent loss of fluid      Objective:     Vital Signs (Most Recent):  Temp: 98.6 °F (37 °C) (10/13/22 0803)  Pulse: 101 (10/14/22 0546)  Resp: 18 (10/13/22 1942)  BP: (!) 111/58 (10/14/22 0542)  SpO2: 100 % (10/14/22 0543)   Vital Signs (24h Range):  Temp:  [98.6 °F (37 °C)] 98.6 °F (37 °C)  Pulse:  [] 101  Resp:  [18] 18  SpO2:  [87 %-100 %] 100 %  BP: ()/(51-78) 111/58     Weight: 71.7 kg (158 lb)  Body mass index is 28.9 kg/m².    FHT: 140 Cat 1 (reassuring)  TOCO:  No contraction      Intake/Output Summary (Last 24 hours) at 10/14/2022 0706  Last data filed at 10/14/2022 0230  Gross per 24 hour   Intake --   Output 4193 ml   Net -4193 ml       Cervical Exam:  Deferred     Significant Labs:  Recent Lab Results       None            Physical Exam:   Constitutional: She appears well-developed and well-nourished. No distress.    HENT:   Head: Normocephalic and atraumatic.    Eyes: EOM are normal.     Cardiovascular:  Normal rate.             Pulmonary/Chest: Effort normal. No respiratory distress.        Abdominal: Soft. She exhibits no distension. There is no abdominal tenderness. There is no rebound and no guarding.   Gravid             Musculoskeletal: Normal range of motion.       Neurological: She is alert.    Skin: Skin is warm and dry.    Psychiatric: She has a normal mood and affect.

## 2022-10-14 NOTE — DISCHARGE INSTRUCTIONS
Home Undelivered Discharge Instructions    After Discharge Orders:    Future Appointments   Date Time Provider Department Center   10/14/2022  1:30 PM INJECTION,  OB-GYN [facility]   10/19/2022 10:15 AM INJECTION,  OB-GYN [facility]   10/26/2022  1:00 PM INJECTION,  OB-GYN [facility]   10/26/2022  1:15 PM Mingo Santamaria MD [facility]   11/10/2022  3:30 PM Mingo Santamaria MD [facility]   11/23/2022  3:15 PM Mingo Santamaria MD [facility]   12/8/2022  3:00 PM Mingo Santamaria MD [facility]   12/15/2022  2:45 PM Mingo Santamaria MD [facility]   12/22/2022  2:45 PM Mingo Santamaria MD [facility]   12/29/2022  2:45 PM Mingo Santamaria MD [facility]           Current Discharge Medication List        CONTINUE these medications which have NOT CHANGED    Details   aspirin (ECOTRIN) 81 MG EC tablet Take 1 tablet (81 mg total) by mouth once daily.  Qty: 150 tablet, Refills: 2    Associated Diagnoses: 11 weeks gestation of pregnancy; History of pregnancy induced hypertension       27 mg iron- 1 mg Tab Take 1 tablet by mouth once daily.      acetaminophen (TYLENOL) 500 MG tablet Take 2 tablets (1,000 mg total) by mouth every 6 (six) hours as needed for Pain.  Qty: 30 tablet, Refills: 1      fluticasone propionate (FLONASE) 50 mcg/actuation nasal spray 1 spray (50 mcg total) by Each Nostril route 2 (two) times daily as needed.  Qty: 15 g, Refills: 0      promethazine (PHENERGAN) 25 MG tablet Take 1 tablet (25 mg total) by mouth every 6 (six) hours as needed for Nausea.  Qty: 30 tablet, Refills: 1    Associated Diagnoses: 6 weeks gestation of pregnancy                     Diet:  normal diet as tolerated    Rest: normal activity as tolerated    Other instructions: Do kick counts once a day on your baby. Choose the time of day your baby is most active. Get in a comfortable lying or sitting position and time how long  it takes to feel 10 kicks, twists, turns, swishes, or rolls. Call your physician or midwife if there have not been 10 kicks in 2 hours    Call physician or midwife, return to Labor and Delivery, call 911, or go to the nearest Emergency Room if: increased leakage or fluid, contractions more than  6 per  1 hour, decreased fetal movement, persistent low back pain or cramping, bleeding from vaginal area, difficulty urinating, pain with urination, difficulty breathing, new calf pain, persistent headache, or vision change

## 2022-10-14 NOTE — PROGRESS NOTES
Pt arrived cora injection given without difficulty. Pt instructed to monitor for any adverse reactions. Pt verbalized understanding.

## 2022-10-14 NOTE — PROGRESS NOTES
[facility] - Labor & Delivery  Obstetrics  Antepartum Progress Note    Patient Name: Fiona Le  MRN: [id number]  Admission Date: 10/12/2022  Hospital Length of Stay: 2 days  Attending Physician: Mingo Santamaria MD  Primary Care Provider: Charles Hein MD    Subjective:     Principal Problem:29 weeks gestation of pregnancy    HPI:  21 yo  at 29w0d  History of  delivery at 34 weeks  On Dora weekly  History of PIH.  On baby aspirin    Came in this morning, 10/12/22 with contractions since 0600.  No vaginal bleeding or rupture of membranes  Good fetal movements        Hospital Course:  On Labor and Delivery, cervix at 3 cm  Admitted for management of  labor    10/13/2022 Rare contractions now.  On Magnesium tocolytic therapy.  Got IV sedation yesterday.  Status post betamethasone #1 yesterday.  Next dose at 0930 today.  On Amp for unknown GBS    10/14/2022 Now 21 hours after second BMZ.  Will stop Mg for now.  If no contraction, discharge home with labor precautions.  Needs to stop by our office for her weekly Dora prior to going home      Obstetric HPI:  Patient reports None contractions, active fetal movement, absent vaginal bleeding , absent loss of fluid      Objective:     Vital Signs (Most Recent):  Temp: 98.6 °F (37 °C) (10/13/22 0803)  Pulse: 101 (10/14/22 0546)  Resp: 18 (10/13/22 194)  BP: (!) 111/58 (10/14/22 0542)  SpO2: 100 % (10/14/22 0543)   Vital Signs (24h Range):  Temp:  [98.6 °F (37 °C)] 98.6 °F (37 °C)  Pulse:  [] 101  Resp:  [18] 18  SpO2:  [87 %-100 %] 100 %  BP: ()/(51-78) 111/58     Weight: 71.7 kg (158 lb)  Body mass index is 28.9 kg/m².    FHT: 140 Cat 1 (reassuring)  TOCO:  No contraction      Intake/Output Summary (Last 24 hours) at 10/14/2022 0706  Last data filed at 10/14/2022 0230  Gross per 24 hour   Intake --   Output 4193 ml   Net -4193 ml       Cervical Exam:  Deferred     Significant Labs:  Recent Lab Results       None            Physical  Exam:   Constitutional: She appears well-developed and well-nourished. No distress.    HENT:   Head: Normocephalic and atraumatic.    Eyes: EOM are normal.     Cardiovascular:  Normal rate.             Pulmonary/Chest: Effort normal. No respiratory distress.        Abdominal: Soft. She exhibits no distension. There is no abdominal tenderness. There is no rebound and no guarding.   Gravid             Musculoskeletal: Normal range of motion.       Neurological: She is alert.    Skin: Skin is warm and dry.    Psychiatric: She has a normal mood and affect.     Assessment/Plan:     22 y.o. female  at 29w2d for:    * 29 weeks gestation of pregnancy  In  labor with regular contractions and cervical dilatation  Admit  Magnesium IV  Celestone x 2  Ampicillin  OB ultrasound done  Neonatology consult done    Mag stopped now.  If no cervical change, consider discharge home later today     labor in third trimester without delivery  In  labor with regular contractions and cervical dilatation  Admit  Magnesium IV  Celestone x 2  Ampicillin  OB ultrasound done  Neonatology consult done    Mag stopped now.  If no cervical change, consider discharge home later today    History of pregnancy induced hypertension  In  labor with regular contractions and cervical dilatation  Admit  Magnesium IV  Celestone x 2  Ampicillin  OB ultrasound done  Neonatology consult done    Mag stopped now.  If no cervical change, consider discharge home later today          Mingo Santamaria MD  Obstetrics  [facility] - Labor & Delivery

## 2022-10-14 NOTE — DISCHARGE SUMMARY
[facility] [place] - Labor & Delivery  Obstetrics  Discharge Summary      Patient Name: Fiona Le  MRN: [id number]  Admission Date: 10/12/2022  Hospital Length of Stay: 2 days  Discharge Date and Time: 10/14/2022  Attending Physician: Stewart Santamaria MD   Discharging Provider: Stewart Santamaria MD   Primary Care Provider: Charles Hein MD    HPI: 21 yo  at 29w0d  History of  delivery at 34 weeks  On [place] weekly  History of PIH.  On baby aspirin    Came in this morning, 10/12/22 with contractions since 0600.  No vaginal bleeding or rupture of membranes  Good fetal movements        FHT: 140 Cat 1 (reassuring)  TOCO:  rare contraction  Cervix at 2 cm    * No surgery found *     Hospital Course:   On Labor and Delivery, cervix at 3 cm  Admitted for management of  labor    10/13/2022 Rare contractions now.  On Magnesium tocolytic therapy.  Got IV sedation yesterday.  Status post betamethasone #1 yesterday.  Next dose at 0930 today.  On Amp for unknown GBS    10/14/2022 Now 21 hours after second BMZ.  Will stop Mg for now.  If no contraction, discharge home with labor precautions.  Needs to stop by our office for her weekly Dora prior to going home    Hospital course was benign.  She is tolerating oral-feeding well.  Exam was benign with patient afebrile, vitals stable, and minimal bleeding.  Cervical exam by me at discharge, 2 cm thick and high  Normal activities.  Patient discharged home on hospital day #3, 10/14/2022   Discharge medications include Dora, prenatal vitamins, and iron supplement.  Follow-up with me next week.  She will stop by our office today for her Dora injection    Stewart Santamaria MD.           Consults (From admission, onward)        Status Ordering Provider     Inpatient consult to Neonatology  Once        Provider:  Tamir Solano MD    Acknowledged STEWART SANTAMARIA          Final Active Diagnoses:    Diagnosis Date Noted POA    PRINCIPAL PROBLEM:  29 weeks gestation of pregnancy  [Z3A.29] 10/12/2022 Not Applicable     labor in third trimester without delivery [O60.03] 10/12/2022 Yes    History of pregnancy induced hypertension [Z87.59] 2016 Not Applicable      Problems Resolved During this Admission:        Significant Diagnostic Studies: normal      Feeding Method: breast    Immunizations     None          This patient has no babies on file.  Pending Diagnostic Studies:     None          Discharged Condition: good    Disposition: Home or Self Care    Follow Up:   Follow-up Information     Mingo Santamaria MD Follow up in 1 week(s).    Specialties: Obstetrics and Gynecology, Obstetrics and Gynecology  Contact information:  [address]  [address]  [facility] [id number] [phone]                       Patient Instructions:      No dressing needed     Medications:  Current Discharge Medication List      CONTINUE these medications which have NOT CHANGED    Details   aspirin (ECOTRIN) 81 MG EC tablet Take 1 tablet (81 mg total) by mouth once daily.  Qty: 150 tablet, Refills: 2    Associated Diagnoses: 11 weeks gestation of pregnancy; History of pregnancy induced hypertension       27 mg iron- 1 mg Tab Take 1 tablet by mouth once daily.      acetaminophen (TYLENOL) 500 MG tablet Take 2 tablets (1,000 mg total) by mouth every 6 (six) hours as needed for Pain.  Qty: 30 tablet, Refills: 1      fluticasone propionate (FLONASE) 50 mcg/actuation nasal spray 1 spray (50 mcg total) by Each Nostril route 2 (two) times daily as needed.  Qty: 15 g, Refills: 0      promethazine (PHENERGAN) 25 MG tablet Take 1 tablet (25 mg total) by mouth every 6 (six) hours as needed for Nausea.  Qty: 30 tablet, Refills: 1    Associated Diagnoses: 6 weeks gestation of pregnancy             Mingo Santamaria MD  Obstetrics  [facility] [place] - Labor & Delivery

## 2022-10-14 NOTE — NURSING
Pt provided with discharge instructions and reviewed, all questions answered, instructed to go to Dr Santamaria's office for injection. Pt ambulated with s/o off the unit without difficulty.

## 2022-10-14 NOTE — TREATMENT PLAN
Pt states she has to have a bowel movement.  Has not had one since her arrival here.  Bedside commode provided. Pt instructed to call out when she is finished.

## 2022-10-19 ENCOUNTER — CLINICAL SUPPORT (OUTPATIENT)
Dept: OBSTETRICS AND GYNECOLOGY | Facility: CLINIC | Age: 22
End: 2022-10-19
Payer: MEDICAID

## 2022-10-19 ENCOUNTER — ROUTINE PRENATAL (OUTPATIENT)
Dept: OBSTETRICS AND GYNECOLOGY | Facility: CLINIC | Age: 22
End: 2022-10-19
Payer: MEDICAID

## 2022-10-19 VITALS — SYSTOLIC BLOOD PRESSURE: 120 MMHG | DIASTOLIC BLOOD PRESSURE: 74 MMHG | BODY MASS INDEX: 31.73 KG/M2 | WEIGHT: 173.5 LBS

## 2022-10-19 DIAGNOSIS — Z87.51 HISTORY OF PRE-TERM LABOR: ICD-10-CM

## 2022-10-19 DIAGNOSIS — Z87.59 HISTORY OF PREGNANCY INDUCED HYPERTENSION: ICD-10-CM

## 2022-10-19 DIAGNOSIS — Z87.51 HISTORY OF PRE-TERM LABOR: Primary | ICD-10-CM

## 2022-10-19 DIAGNOSIS — Z3A.30 30 WEEKS GESTATION OF PREGNANCY: Primary | ICD-10-CM

## 2022-10-19 PROCEDURE — 1111F PR DISCHARGE MEDS RECONCILED W/ CURRENT OUTPATIENT MED LIST: ICD-10-PCS | Mod: CPTII,,, | Performed by: OBSTETRICS & GYNECOLOGY

## 2022-10-19 PROCEDURE — 99999 PR PBB SHADOW E&M-EST. PATIENT-LVL III: ICD-10-PCS | Mod: PBBFAC,,, | Performed by: OBSTETRICS & GYNECOLOGY

## 2022-10-19 PROCEDURE — 99213 PR OFFICE/OUTPT VISIT, EST, LEVL III, 20-29 MIN: ICD-10-PCS | Mod: TH,S$PBB,, | Performed by: OBSTETRICS & GYNECOLOGY

## 2022-10-19 PROCEDURE — 96372 THER/PROPH/DIAG INJ SC/IM: CPT | Mod: PBBFAC

## 2022-10-19 PROCEDURE — 99213 OFFICE O/P EST LOW 20 MIN: CPT | Mod: TH,S$PBB,, | Performed by: OBSTETRICS & GYNECOLOGY

## 2022-10-19 PROCEDURE — 99999 PR PBB SHADOW E&M-EST. PATIENT-LVL III: CPT | Mod: PBBFAC,,, | Performed by: OBSTETRICS & GYNECOLOGY

## 2022-10-19 PROCEDURE — 1111F DSCHRG MED/CURRENT MED MERGE: CPT | Mod: CPTII,,, | Performed by: OBSTETRICS & GYNECOLOGY

## 2022-10-19 PROCEDURE — 99213 OFFICE O/P EST LOW 20 MIN: CPT | Mod: PBBFAC | Performed by: OBSTETRICS & GYNECOLOGY

## 2022-10-19 RX ADMIN — HYDROXYPROGESTERONE CAPROATE 250 MG: 250 INJECTION INTRAMUSCULAR at 01:10

## 2022-10-19 NOTE — LETTER
October 19, 2022    Fiona Le  [address] [address]             [facility] [place] - OB GYN  [address]  [facility] [address]  Phone: [phone] To Whom It May Concern:     is currently under our care for pregnancy; Estimated Date of Delivery: 12/28/22. This is to inform you that she will begin her maternity leave effective 10/12/2022.    If you have any questions, please don't hesitate to contact our office.       Sincerely,        Mingo Santamaria M.D.

## 2022-10-19 NOTE — PROGRESS NOTES
30w0  Patient comes in today for follow-up prenatal care  No new complaint.  No vaginal bleeding, contractions, or rupture of membranes.  Good fetal movements.    Eating well without significant nausea/vomiting  Gaining weight   Taking prenatal vitamins, iron supplement, and baby aspirin  Taking [place] weekly for history of  delivery    Was in hospital with  labor. Now status post BMZ x 2  Still with contractions and pain.  But no cervical change    Doing Connected MOM.  But nothing transferred    Discussed with patient MFM's findings and recommendations  Discussed management of  labor.  Continue with Dora weekly  Continue with baby aspirin.  History of PIH    Back in 2 weeks  Tdap and flu vaccine at next visit with her Carl    Vitals signs, FHTs, urine dip, and PE findings documented, reviewed and available in OB flow chart.   I spent a total of 20 minutes on the day of the visit. This includes face to face time and non-face to face time preparing to see the patient (eg, review of tests and charts), Obtaining and/or reviewing separately obtained history, Documenting clinical information in the electronic or other health record, Independently interpreting results and communicating results to the patient/family/caregiver, or Care coordination.

## 2022-10-27 ENCOUNTER — ROUTINE PRENATAL (OUTPATIENT)
Dept: OBSTETRICS AND GYNECOLOGY | Facility: CLINIC | Age: 22
End: 2022-10-27
Payer: MEDICAID

## 2022-10-27 ENCOUNTER — CLINICAL SUPPORT (OUTPATIENT)
Dept: OBSTETRICS AND GYNECOLOGY | Facility: CLINIC | Age: 22
End: 2022-10-27
Payer: MEDICAID

## 2022-10-27 VITALS
BODY MASS INDEX: 32.26 KG/M2 | SYSTOLIC BLOOD PRESSURE: 120 MMHG | DIASTOLIC BLOOD PRESSURE: 70 MMHG | WEIGHT: 176.38 LBS

## 2022-10-27 DIAGNOSIS — Z87.59 HISTORY OF PREGNANCY INDUCED HYPERTENSION: ICD-10-CM

## 2022-10-27 DIAGNOSIS — Z87.51 HISTORY OF PRE-TERM LABOR: ICD-10-CM

## 2022-10-27 DIAGNOSIS — Z3A.31 31 WEEKS GESTATION OF PREGNANCY: Primary | ICD-10-CM

## 2022-10-27 DIAGNOSIS — N89.8 VAGINAL DISCHARGE: ICD-10-CM

## 2022-10-27 DIAGNOSIS — Z87.51 HISTORY OF PRE-TERM LABOR: Primary | ICD-10-CM

## 2022-10-27 PROBLEM — Z3A.29 29 WEEKS GESTATION OF PREGNANCY: Status: RESOLVED | Noted: 2022-10-12 | Resolved: 2022-10-27

## 2022-10-27 PROCEDURE — 99999 PR PBB SHADOW E&M-EST. PATIENT-LVL I: ICD-10-PCS | Mod: PBBFAC,,,

## 2022-10-27 PROCEDURE — 1111F PR DISCHARGE MEDS RECONCILED W/ CURRENT OUTPATIENT MED LIST: ICD-10-PCS | Mod: CPTII,,, | Performed by: OBSTETRICS & GYNECOLOGY

## 2022-10-27 PROCEDURE — 81514 NFCT DS BV&VAGINITIS DNA ALG: CPT | Performed by: OBSTETRICS & GYNECOLOGY

## 2022-10-27 PROCEDURE — 96372 THER/PROPH/DIAG INJ SC/IM: CPT | Mod: PBBFAC

## 2022-10-27 PROCEDURE — 99213 OFFICE O/P EST LOW 20 MIN: CPT | Mod: PBBFAC | Performed by: OBSTETRICS & GYNECOLOGY

## 2022-10-27 PROCEDURE — 99213 PR OFFICE/OUTPT VISIT, EST, LEVL III, 20-29 MIN: ICD-10-PCS | Mod: TH,S$PBB,, | Performed by: OBSTETRICS & GYNECOLOGY

## 2022-10-27 PROCEDURE — 99999 PR PBB SHADOW E&M-EST. PATIENT-LVL III: ICD-10-PCS | Mod: PBBFAC,,, | Performed by: OBSTETRICS & GYNECOLOGY

## 2022-10-27 PROCEDURE — 99213 OFFICE O/P EST LOW 20 MIN: CPT | Mod: TH,S$PBB,, | Performed by: OBSTETRICS & GYNECOLOGY

## 2022-10-27 PROCEDURE — 1111F DSCHRG MED/CURRENT MED MERGE: CPT | Mod: CPTII,,, | Performed by: OBSTETRICS & GYNECOLOGY

## 2022-10-27 PROCEDURE — 99999 PR PBB SHADOW E&M-EST. PATIENT-LVL III: CPT | Mod: PBBFAC,,, | Performed by: OBSTETRICS & GYNECOLOGY

## 2022-10-27 PROCEDURE — 99999 PR PBB SHADOW E&M-EST. PATIENT-LVL I: CPT | Mod: PBBFAC,,,

## 2022-10-27 RX ORDER — METRONIDAZOLE 500 MG/1
500 TABLET ORAL EVERY 12 HOURS
Qty: 14 TABLET | Refills: 0 | Status: SHIPPED | OUTPATIENT
Start: 2022-10-27 | End: 2022-10-31

## 2022-10-27 RX ADMIN — HYDROXYPROGESTERONE CAPROATE 250 MG: 250 INJECTION INTRAMUSCULAR at 03:10

## 2022-10-27 NOTE — PROGRESS NOTES
31w1d  Patient comes in today for follow-up prenatal care  No new complaint except for vaginal discharge and irritation.  No vaginal bleeding, contractions, or rupture of membranes.  Good fetal movements.    Eating well without significant nausea/vomiting  Gaining weight   Taking prenatal vitamins, iron supplement, and baby aspirin  Taking Dora weekly for history of  delivery    Was in hospital with  labor. Now status post BMZ x 2  Still with contractions and pain.  But no cervical change    Doing Connected MOM.  But nothing transferred    Exam with yellow discharge    Discussed with patient MFM's findings and recommendations  Discussed management of  labor.  Continue with [place] weekly  Continue with baby aspirin.  History of PIH  Vaginosis screen  Metronidazole   Labor precautions  Fetal kick count  Back in 2 weeks  Declines Tdap and flu vaccine    Vitals signs, FHTs, urine dip, and PE findings documented, reviewed and available in OB flow chart.   I spent a total of 20 minutes on the day of the visit. This includes face to face time and non-face to face time preparing to see the patient (eg, review of tests and charts), Obtaining and/or reviewing separately obtained history, Documenting clinical information in the electronic or other health record, Independently interpreting results and communicating results to the patient/family/caregiver, or Care coordination.

## 2022-10-31 ENCOUNTER — PATIENT MESSAGE (OUTPATIENT)
Dept: OBSTETRICS AND GYNECOLOGY | Facility: CLINIC | Age: 22
End: 2022-10-31
Payer: MEDICAID

## 2022-10-31 DIAGNOSIS — B37.31 CANDIDA VAGINITIS: ICD-10-CM

## 2022-10-31 DIAGNOSIS — A59.01 TRICHOMONAS VAGINITIS: Primary | ICD-10-CM

## 2022-10-31 DIAGNOSIS — N76.0 BACTERIAL VAGINOSIS: ICD-10-CM

## 2022-10-31 DIAGNOSIS — B96.89 BACTERIAL VAGINOSIS: ICD-10-CM

## 2022-10-31 LAB
BACTERIAL VAGINOSIS DNA: POSITIVE
CANDIDA GLABRATA DNA: NEGATIVE
CANDIDA KRUSEI DNA: NEGATIVE
CANDIDA RRNA VAG QL PROBE: POSITIVE
T VAGINALIS RRNA GENITAL QL PROBE: POSITIVE

## 2022-10-31 RX ORDER — METRONIDAZOLE 500 MG/1
500 TABLET ORAL EVERY 12 HOURS
Qty: 14 TABLET | Refills: 0 | Status: ON HOLD | OUTPATIENT
Start: 2022-10-31 | End: 2022-11-27

## 2022-10-31 RX ORDER — FLUCONAZOLE 150 MG/1
150 TABLET ORAL DAILY
Qty: 1 TABLET | Refills: 0 | Status: SHIPPED | OUTPATIENT
Start: 2022-10-31 | End: 2022-11-01

## 2022-11-01 NOTE — TELEPHONE ENCOUNTER
Metronidazole prescribed for bacterial vaginosis and trichomonas vaginitis   Terconazole cream prescribed for Candida vaginitis

## 2022-11-02 ENCOUNTER — PATIENT MESSAGE (OUTPATIENT)
Dept: OBSTETRICS AND GYNECOLOGY | Facility: CLINIC | Age: 22
End: 2022-11-02
Payer: MEDICAID

## 2022-11-02 ENCOUNTER — PATIENT MESSAGE (OUTPATIENT)
Dept: ADMINISTRATIVE | Facility: OTHER | Age: 22
End: 2022-11-02
Payer: MEDICAID

## 2022-11-03 ENCOUNTER — CLINICAL SUPPORT (OUTPATIENT)
Dept: OBSTETRICS AND GYNECOLOGY | Facility: CLINIC | Age: 22
End: 2022-11-03
Payer: MEDICAID

## 2022-11-03 DIAGNOSIS — Z87.51 HISTORY OF PRE-TERM LABOR: Primary | ICD-10-CM

## 2022-11-03 PROCEDURE — 99999 PR PBB SHADOW E&M-EST. PATIENT-LVL I: CPT | Mod: PBBFAC,,,

## 2022-11-03 PROCEDURE — 96372 THER/PROPH/DIAG INJ SC/IM: CPT | Mod: PBBFAC

## 2022-11-03 PROCEDURE — 99999 PR PBB SHADOW E&M-EST. PATIENT-LVL I: ICD-10-PCS | Mod: PBBFAC,,,

## 2022-11-03 RX ADMIN — HYDROXYPROGESTERONE CAPROATE 250 MG: 250 INJECTION INTRAMUSCULAR at 01:11

## 2022-11-11 ENCOUNTER — ROUTINE PRENATAL (OUTPATIENT)
Dept: OBSTETRICS AND GYNECOLOGY | Facility: CLINIC | Age: 22
End: 2022-11-11
Payer: MEDICAID

## 2022-11-11 ENCOUNTER — CLINICAL SUPPORT (OUTPATIENT)
Dept: OBSTETRICS AND GYNECOLOGY | Facility: CLINIC | Age: 22
End: 2022-11-11
Payer: MEDICAID

## 2022-11-11 VITALS — DIASTOLIC BLOOD PRESSURE: 66 MMHG | BODY MASS INDEX: 33.47 KG/M2 | SYSTOLIC BLOOD PRESSURE: 114 MMHG | WEIGHT: 183 LBS

## 2022-11-11 DIAGNOSIS — Z87.51 HISTORY OF PRE-TERM LABOR: Primary | ICD-10-CM

## 2022-11-11 DIAGNOSIS — Z87.51 HISTORY OF PRE-TERM LABOR: ICD-10-CM

## 2022-11-11 DIAGNOSIS — Z3A.33 33 WEEKS GESTATION OF PREGNANCY: Primary | ICD-10-CM

## 2022-11-11 DIAGNOSIS — Z87.59 HISTORY OF PREGNANCY INDUCED HYPERTENSION: ICD-10-CM

## 2022-11-11 PROCEDURE — 1111F DSCHRG MED/CURRENT MED MERGE: CPT | Mod: CPTII,,, | Performed by: OBSTETRICS & GYNECOLOGY

## 2022-11-11 PROCEDURE — 99213 PR OFFICE/OUTPT VISIT, EST, LEVL III, 20-29 MIN: ICD-10-PCS | Mod: TH,S$PBB,, | Performed by: OBSTETRICS & GYNECOLOGY

## 2022-11-11 PROCEDURE — 99999 PR PBB SHADOW E&M-EST. PATIENT-LVL III: ICD-10-PCS | Mod: PBBFAC,,, | Performed by: OBSTETRICS & GYNECOLOGY

## 2022-11-11 PROCEDURE — 99213 OFFICE O/P EST LOW 20 MIN: CPT | Mod: TH,S$PBB,, | Performed by: OBSTETRICS & GYNECOLOGY

## 2022-11-11 PROCEDURE — 96372 THER/PROPH/DIAG INJ SC/IM: CPT | Mod: PBBFAC

## 2022-11-11 PROCEDURE — 99213 OFFICE O/P EST LOW 20 MIN: CPT | Mod: PBBFAC,25 | Performed by: OBSTETRICS & GYNECOLOGY

## 2022-11-11 PROCEDURE — 1111F PR DISCHARGE MEDS RECONCILED W/ CURRENT OUTPATIENT MED LIST: ICD-10-PCS | Mod: CPTII,,, | Performed by: OBSTETRICS & GYNECOLOGY

## 2022-11-11 PROCEDURE — 99999 PR PBB SHADOW E&M-EST. PATIENT-LVL III: CPT | Mod: PBBFAC,,, | Performed by: OBSTETRICS & GYNECOLOGY

## 2022-11-11 RX ADMIN — HYDROXYPROGESTERONE CAPROATE 250 MG: 250 INJECTION INTRAMUSCULAR at 10:11

## 2022-11-11 NOTE — PROGRESS NOTES
33w2d  Patient comes in today for follow-up prenatal care  No new complaint except for vaginal discharge.  No vaginal bleeding, contractions, or rupture of membranes.  Good fetal movements.    Eating well without significant nausea/vomiting  Gaining weight   Taking prenatal vitamins, iron supplement, and baby aspirin  Taking [place] weekly for history of  delivery    Was in hospital with  labor. Now status post BMZ x 2    Doing [facility] MOM.  But nothing transferred    Discussed with patient MFM's findings and recommendations  Discussed management of  labor.  Continue with [place] weekly  Continue with baby aspirin.  History of PIH  Labor precautions  Fetal kick count  Back next week  Declines Tdap and flu vaccine    Vitals signs, FHTs, urine dip, and PE findings documented, reviewed and available in OB flow chart.   I spent a total of 20 minutes on the day of the visit. This includes face to face time and non-face to face time preparing to see the patient (eg, review of tests and charts), Obtaining and/or reviewing separately obtained history, Documenting clinical information in the electronic or other health record, Independently interpreting results and communicating results to the patient/family/caregiver, or Care coordination.

## 2022-11-11 NOTE — PROGRESS NOTES
OB here for routine exam.  Pt has been experiencing migraine headaches for the past few days. Pt has no new complaints today. jtn

## 2022-11-25 ENCOUNTER — ANESTHESIA EVENT (OUTPATIENT)
Dept: OBSTETRICS AND GYNECOLOGY | Facility: HOSPITAL | Age: 22
End: 2022-11-25
Payer: MEDICAID

## 2022-11-25 ENCOUNTER — ANESTHESIA (OUTPATIENT)
Dept: OBSTETRICS AND GYNECOLOGY | Facility: HOSPITAL | Age: 22
End: 2022-11-25
Payer: MEDICAID

## 2022-11-25 ENCOUNTER — HOSPITAL ENCOUNTER (INPATIENT)
Facility: HOSPITAL | Age: 22
LOS: 2 days | Discharge: HOME | End: 2022-11-27
Attending: OBSTETRICS & GYNECOLOGY | Admitting: OBSTETRICS & GYNECOLOGY
Payer: MEDICAID

## 2022-11-25 DIAGNOSIS — R10.9 ABDOMINAL PAIN AFFECTING PREGNANCY: ICD-10-CM

## 2022-11-25 DIAGNOSIS — O26.899 ABDOMINAL PAIN AFFECTING PREGNANCY: ICD-10-CM

## 2022-11-25 DIAGNOSIS — O60.03 PRETERM LABOR IN THIRD TRIMESTER WITHOUT DELIVERY: ICD-10-CM

## 2022-11-25 PROBLEM — Z3A.35 35 WEEKS GESTATION OF PREGNANCY: Status: RESOLVED | Noted: 2022-11-25 | Resolved: 2022-11-25

## 2022-11-25 PROBLEM — Z3A.35 35 WEEKS GESTATION OF PREGNANCY: Status: ACTIVE | Noted: 2022-11-25

## 2022-11-25 LAB
ABO + RH BLD: NORMAL
ALBUMIN SERPL BCP-MCNC: 2.8 G/DL (ref 3.5–5.2)
ALP SERPL-CCNC: 112 U/L (ref 55–135)
ALT SERPL W/O P-5'-P-CCNC: 12 U/L (ref 10–44)
ANION GAP SERPL CALC-SCNC: 10 MMOL/L (ref 8–16)
AST SERPL-CCNC: 14 U/L (ref 10–40)
BASOPHILS # BLD AUTO: 0.03 K/UL (ref 0–0.2)
BASOPHILS NFR BLD: 0.2 % (ref 0–1.9)
BILIRUB SERPL-MCNC: 0.3 MG/DL (ref 0.1–1)
BLD GP AB SCN CELLS X3 SERPL QL: NORMAL
BUN SERPL-MCNC: 8 MG/DL (ref 6–20)
CALCIUM SERPL-MCNC: 8.8 MG/DL (ref 8.7–10.5)
CHLORIDE SERPL-SCNC: 107 MMOL/L (ref 95–110)
CO2 SERPL-SCNC: 21 MMOL/L (ref 23–29)
CREAT SERPL-MCNC: 0.6 MG/DL (ref 0.5–1.4)
DIFFERENTIAL METHOD: ABNORMAL
EOSINOPHIL # BLD AUTO: 0.1 K/UL (ref 0–0.5)
EOSINOPHIL NFR BLD: 0.4 % (ref 0–8)
ERYTHROCYTE [DISTWIDTH] IN BLOOD BY AUTOMATED COUNT: 13.6 % (ref 11.5–14.5)
EST. GFR  (NO RACE VARIABLE): >60 ML/MIN/1.73 M^2
GLUCOSE SERPL-MCNC: 84 MG/DL (ref 70–110)
HCT VFR BLD AUTO: 31.6 % (ref 37–48.5)
HGB BLD-MCNC: 10.1 G/DL (ref 12–16)
IMM GRANULOCYTES # BLD AUTO: 0.1 K/UL (ref 0–0.04)
IMM GRANULOCYTES NFR BLD AUTO: 0.7 % (ref 0–0.5)
LYMPHOCYTES # BLD AUTO: 3.3 K/UL (ref 1–4.8)
LYMPHOCYTES NFR BLD: 24.5 % (ref 18–48)
MCH RBC QN AUTO: 27.2 PG (ref 27–31)
MCHC RBC AUTO-ENTMCNC: 32 G/DL (ref 32–36)
MCV RBC AUTO: 85 FL (ref 82–98)
MONOCYTES # BLD AUTO: 1 K/UL (ref 0.3–1)
MONOCYTES NFR BLD: 7.3 % (ref 4–15)
NEUTROPHILS # BLD AUTO: 8.9 K/UL (ref 1.8–7.7)
NEUTROPHILS NFR BLD: 66.9 % (ref 38–73)
NRBC BLD-RTO: 0 /100 WBC
PLATELET # BLD AUTO: 195 K/UL (ref 150–450)
PMV BLD AUTO: 10.5 FL (ref 9.2–12.9)
POTASSIUM SERPL-SCNC: 3.9 MMOL/L (ref 3.5–5.1)
PROT SERPL-MCNC: 7.3 G/DL (ref 6–8.4)
RBC # BLD AUTO: 3.71 M/UL (ref 4–5.4)
SODIUM SERPL-SCNC: 138 MMOL/L (ref 136–145)
WBC # BLD AUTO: 13.37 K/UL (ref 3.9–12.7)

## 2022-11-25 PROCEDURE — 59409 PR OBSTETRICAL CARE,VAG DELIV ONLY: ICD-10-PCS | Mod: AT,,, | Performed by: OBSTETRICS & GYNECOLOGY

## 2022-11-25 PROCEDURE — 99211 OFF/OP EST MAY X REQ PHY/QHP: CPT | Mod: 25,TH

## 2022-11-25 PROCEDURE — 25000003 PHARM REV CODE 250: Performed by: OBSTETRICS & GYNECOLOGY

## 2022-11-25 PROCEDURE — 72200004 HC VAGINAL DELIVERY LEVEL I

## 2022-11-25 PROCEDURE — 59409 OBSTETRICAL CARE: CPT | Mod: AT,,, | Performed by: OBSTETRICS & GYNECOLOGY

## 2022-11-25 PROCEDURE — 62326 NJX INTERLAMINAR LMBR/SAC: CPT | Performed by: ANESTHESIOLOGY

## 2022-11-25 PROCEDURE — 51702 INSERT TEMP BLADDER CATH: CPT

## 2022-11-25 PROCEDURE — 63600175 PHARM REV CODE 636 W HCPCS: Performed by: OBSTETRICS & GYNECOLOGY

## 2022-11-25 PROCEDURE — 59409 OBSTETRICAL CARE: CPT | Mod: AA,,, | Performed by: ANESTHESIOLOGY

## 2022-11-25 PROCEDURE — 86850 RBC ANTIBODY SCREEN: CPT | Performed by: OBSTETRICS & GYNECOLOGY

## 2022-11-25 PROCEDURE — 59409 PRA ETRICAL CARE,VAG DELIV ONLY: ICD-10-PCS | Mod: AA,,, | Performed by: ANESTHESIOLOGY

## 2022-11-25 PROCEDURE — 72100002 HC LABOR CARE, 1ST 8 HOURS

## 2022-11-25 PROCEDURE — 59025 FETAL NON-STRESS TEST: CPT

## 2022-11-25 PROCEDURE — 11000001 HC ACUTE MED/SURG PRIVATE ROOM

## 2022-11-25 PROCEDURE — C1751 CATH, INF, PER/CENT/MIDLINE: HCPCS | Performed by: ANESTHESIOLOGY

## 2022-11-25 PROCEDURE — 27200710 HC EPIDURAL INFUSION PUMP SET: Performed by: ANESTHESIOLOGY

## 2022-11-25 PROCEDURE — 80053 COMPREHEN METABOLIC PANEL: CPT | Performed by: OBSTETRICS & GYNECOLOGY

## 2022-11-25 PROCEDURE — 85025 COMPLETE CBC W/AUTO DIFF WBC: CPT | Performed by: OBSTETRICS & GYNECOLOGY

## 2022-11-25 PROCEDURE — 25000003 PHARM REV CODE 250: Performed by: ANESTHESIOLOGY

## 2022-11-25 PROCEDURE — 86592 SYPHILIS TEST NON-TREP QUAL: CPT | Performed by: OBSTETRICS & GYNECOLOGY

## 2022-11-25 RX ORDER — LIDOCAINE HYDROCHLORIDE 10 MG/ML
10 INJECTION INFILTRATION; PERINEURAL ONCE AS NEEDED
Status: DISCONTINUED | OUTPATIENT
Start: 2022-11-25 | End: 2022-11-25

## 2022-11-25 RX ORDER — DIPHENHYDRAMINE HYDROCHLORIDE 50 MG/ML
25 INJECTION INTRAMUSCULAR; INTRAVENOUS EVERY 4 HOURS PRN
Status: DISCONTINUED | OUTPATIENT
Start: 2022-11-25 | End: 2022-11-27 | Stop reason: HOSPADM

## 2022-11-25 RX ORDER — OXYTOCIN/RINGER'S LACTATE 30/500 ML
95 PLASTIC BAG, INJECTION (ML) INTRAVENOUS ONCE
Status: COMPLETED | OUTPATIENT
Start: 2022-11-25 | End: 2022-11-25

## 2022-11-25 RX ORDER — ONDANSETRON 8 MG/1
8 TABLET, ORALLY DISINTEGRATING ORAL EVERY 8 HOURS PRN
Status: DISCONTINUED | OUTPATIENT
Start: 2022-11-25 | End: 2022-11-27 | Stop reason: HOSPADM

## 2022-11-25 RX ORDER — LIDOCAINE HYDROCHLORIDE AND EPINEPHRINE 15; 5 MG/ML; UG/ML
INJECTION, SOLUTION EPIDURAL
Status: DISCONTINUED | OUTPATIENT
Start: 2022-11-25 | End: 2022-11-25

## 2022-11-25 RX ORDER — OXYCODONE AND ACETAMINOPHEN 5; 325 MG/1; MG/1
1 TABLET ORAL EVERY 4 HOURS PRN
Status: DISCONTINUED | OUTPATIENT
Start: 2022-11-25 | End: 2022-11-27 | Stop reason: HOSPADM

## 2022-11-25 RX ORDER — MISOPROSTOL 200 UG/1
800 TABLET ORAL
Status: DISCONTINUED | OUTPATIENT
Start: 2022-11-25 | End: 2022-11-25

## 2022-11-25 RX ORDER — CARBOPROST TROMETHAMINE 250 UG/ML
250 INJECTION, SOLUTION INTRAMUSCULAR
Status: DISCONTINUED | OUTPATIENT
Start: 2022-11-25 | End: 2022-11-25

## 2022-11-25 RX ORDER — DOCUSATE SODIUM 100 MG/1
200 CAPSULE, LIQUID FILLED ORAL 2 TIMES DAILY PRN
Status: DISCONTINUED | OUTPATIENT
Start: 2022-11-25 | End: 2022-11-27 | Stop reason: HOSPADM

## 2022-11-25 RX ORDER — ACETAMINOPHEN 325 MG/1
650 TABLET ORAL EVERY 6 HOURS PRN
Status: DISCONTINUED | OUTPATIENT
Start: 2022-11-25 | End: 2022-11-27 | Stop reason: HOSPADM

## 2022-11-25 RX ORDER — IBUPROFEN 600 MG/1
600 TABLET ORAL EVERY 6 HOURS
Status: DISCONTINUED | OUTPATIENT
Start: 2022-11-25 | End: 2022-11-25

## 2022-11-25 RX ORDER — BUPIVACAINE HYDROCHLORIDE 2.5 MG/ML
INJECTION, SOLUTION EPIDURAL; INFILTRATION; INTRACAUDAL
Status: DISCONTINUED | OUTPATIENT
Start: 2022-11-25 | End: 2022-11-25

## 2022-11-25 RX ORDER — PROCHLORPERAZINE EDISYLATE 5 MG/ML
5 INJECTION INTRAMUSCULAR; INTRAVENOUS EVERY 6 HOURS PRN
Status: DISCONTINUED | OUTPATIENT
Start: 2022-11-25 | End: 2022-11-27 | Stop reason: HOSPADM

## 2022-11-25 RX ORDER — OXYTOCIN/RINGER'S LACTATE 30/500 ML
334 PLASTIC BAG, INJECTION (ML) INTRAVENOUS ONCE
Status: DISCONTINUED | OUTPATIENT
Start: 2022-11-25 | End: 2022-11-25

## 2022-11-25 RX ORDER — IBUPROFEN 600 MG/1
600 TABLET ORAL EVERY 6 HOURS
Status: DISCONTINUED | OUTPATIENT
Start: 2022-11-25 | End: 2022-11-27 | Stop reason: HOSPADM

## 2022-11-25 RX ORDER — SIMETHICONE 80 MG
1 TABLET,CHEWABLE ORAL 4 TIMES DAILY PRN
Status: DISCONTINUED | OUTPATIENT
Start: 2022-11-25 | End: 2022-11-25

## 2022-11-25 RX ORDER — TRANEXAMIC ACID 100 MG/ML
1000 INJECTION, SOLUTION INTRAVENOUS ONCE AS NEEDED
Status: DISCONTINUED | OUTPATIENT
Start: 2022-11-25 | End: 2022-11-25

## 2022-11-25 RX ORDER — DIPHENHYDRAMINE HCL 25 MG
25 CAPSULE ORAL EVERY 4 HOURS PRN
Status: DISCONTINUED | OUTPATIENT
Start: 2022-11-25 | End: 2022-11-27 | Stop reason: HOSPADM

## 2022-11-25 RX ORDER — FENTANYL/BUPIVACAINE/NS/PF 2MCG/ML-.1
PLASTIC BAG, INJECTION (ML) INJECTION CONTINUOUS PRN
Status: DISCONTINUED | OUTPATIENT
Start: 2022-11-25 | End: 2022-11-25

## 2022-11-25 RX ORDER — METHYLERGONOVINE MALEATE 0.2 MG/ML
200 INJECTION INTRAVENOUS
Status: DISCONTINUED | OUTPATIENT
Start: 2022-11-25 | End: 2022-11-25

## 2022-11-25 RX ORDER — ONDANSETRON 8 MG/1
8 TABLET, ORALLY DISINTEGRATING ORAL EVERY 8 HOURS PRN
Status: DISCONTINUED | OUTPATIENT
Start: 2022-11-25 | End: 2022-11-25

## 2022-11-25 RX ORDER — SODIUM CHLORIDE, SODIUM LACTATE, POTASSIUM CHLORIDE, CALCIUM CHLORIDE 600; 310; 30; 20 MG/100ML; MG/100ML; MG/100ML; MG/100ML
INJECTION, SOLUTION INTRAVENOUS CONTINUOUS
Status: DISCONTINUED | OUTPATIENT
Start: 2022-11-25 | End: 2022-11-25

## 2022-11-25 RX ORDER — OXYTOCIN/RINGER'S LACTATE 30/500 ML
95 PLASTIC BAG, INJECTION (ML) INTRAVENOUS ONCE
Status: DISCONTINUED | OUTPATIENT
Start: 2022-11-25 | End: 2022-11-25

## 2022-11-25 RX ORDER — PRENATAL WITH FERROUS FUM AND FOLIC ACID 3080; 920; 120; 400; 22; 1.84; 3; 20; 10; 1; 12; 200; 27; 25; 2 [IU]/1; [IU]/1; MG/1; [IU]/1; MG/1; MG/1; MG/1; MG/1; MG/1; MG/1; UG/1; MG/1; MG/1; MG/1; MG/1
1 TABLET ORAL DAILY
Status: DISCONTINUED | OUTPATIENT
Start: 2022-11-25 | End: 2022-11-27 | Stop reason: HOSPADM

## 2022-11-25 RX ORDER — HYDROCORTISONE 25 MG/G
CREAM TOPICAL 3 TIMES DAILY PRN
Status: DISCONTINUED | OUTPATIENT
Start: 2022-11-25 | End: 2022-11-27 | Stop reason: HOSPADM

## 2022-11-25 RX ORDER — MUPIROCIN 20 MG/G
OINTMENT TOPICAL
Status: DISCONTINUED | OUTPATIENT
Start: 2022-11-25 | End: 2022-11-25

## 2022-11-25 RX ORDER — SIMETHICONE 80 MG
1 TABLET,CHEWABLE ORAL EVERY 6 HOURS PRN
Status: DISCONTINUED | OUTPATIENT
Start: 2022-11-25 | End: 2022-11-27 | Stop reason: HOSPADM

## 2022-11-25 RX ORDER — CALCIUM CARBONATE 200(500)MG
500 TABLET,CHEWABLE ORAL 3 TIMES DAILY PRN
Status: DISCONTINUED | OUTPATIENT
Start: 2022-11-25 | End: 2022-11-25

## 2022-11-25 RX ORDER — PROCHLORPERAZINE EDISYLATE 5 MG/ML
5 INJECTION INTRAMUSCULAR; INTRAVENOUS EVERY 6 HOURS PRN
Status: DISCONTINUED | OUTPATIENT
Start: 2022-11-25 | End: 2022-11-25

## 2022-11-25 RX ORDER — DIPHENOXYLATE HYDROCHLORIDE AND ATROPINE SULFATE 2.5; .025 MG/1; MG/1
1 TABLET ORAL 4 TIMES DAILY PRN
Status: DISCONTINUED | OUTPATIENT
Start: 2022-11-25 | End: 2022-11-25

## 2022-11-25 RX ADMIN — IBUPROFEN 600 MG: 600 TABLET ORAL at 05:11

## 2022-11-25 RX ADMIN — LIDOCAINE HYDROCHLORIDE,EPINEPHRINE BITARTRATE 3 ML: 15; .005 INJECTION, SOLUTION EPIDURAL; INFILTRATION; INTRACAUDAL; PERINEURAL at 04:11

## 2022-11-25 RX ADMIN — OXYCODONE AND ACETAMINOPHEN 1 TABLET: 5; 325 TABLET ORAL at 02:11

## 2022-11-25 RX ADMIN — BUPIVACAINE HYDROCHLORIDE 4 ML: 2.5 INJECTION, SOLUTION EPIDURAL; INFILTRATION; INTRACAUDAL; PERINEURAL at 04:11

## 2022-11-25 RX ADMIN — IBUPROFEN 600 MG: 600 TABLET ORAL at 08:11

## 2022-11-25 RX ADMIN — IBUPROFEN 600 MG: 600 TABLET ORAL at 11:11

## 2022-11-25 RX ADMIN — OXYCODONE AND ACETAMINOPHEN 1 TABLET: 5; 325 TABLET ORAL at 07:11

## 2022-11-25 RX ADMIN — Medication 10 ML/HR: at 04:11

## 2022-11-25 RX ADMIN — AMPICILLIN SODIUM 2 G: 2 INJECTION, POWDER, FOR SOLUTION INTRAMUSCULAR; INTRAVENOUS at 02:11

## 2022-11-25 RX ADMIN — SODIUM CHLORIDE, SODIUM LACTATE, POTASSIUM CHLORIDE, AND CALCIUM CHLORIDE: .6; .31; .03; .02 INJECTION, SOLUTION INTRAVENOUS at 02:11

## 2022-11-25 RX ADMIN — AMPICILLIN SODIUM 1 G: 1 INJECTION, POWDER, FOR SOLUTION INTRAMUSCULAR; INTRAVENOUS at 06:11

## 2022-11-25 RX ADMIN — OXYCODONE AND ACETAMINOPHEN 1 TABLET: 5; 325 TABLET ORAL at 08:11

## 2022-11-25 RX ADMIN — Medication 334 MILLI-UNITS/MIN: at 07:11

## 2022-11-25 NOTE — NURSING
0757-RN notified MD Santamaria of pt pain complaints and pt request for percocet.  Order received for 5mg PO Percocet q4hr PRN.     0950-Pt ambulated to bathroom with minimal RN assist and voided 800cc.  Pericare done and panties/pads on.  Linens and gown changed.  Pt back to bed in no apparent distress or discomfort.

## 2022-11-25 NOTE — NURSING
Patient History  GA: 35w2d   GP:    Estimated Date of Delivery: 22   Antepartum complications:   Past Medical History:   Diagnosis Date    Asthma     Hypertension affecting pregnancy in third trimester 2021       OB History    Para Term  AB Living   3 2 1 1   2   SAB IAB Ectopic Multiple Live Births         0 2      # Outcome Date GA Lbr Nahid/2nd Weight Sex Delivery Anes PTL Lv   3 Current            2  21 34w1d 01:18 / 00:04 1.82 kg (4 lb 0.2 oz) F Vag-Spont EPI Y DOUG   1 Term 16 38w5d  3.738 kg (8 lb 3.9 oz) M Vag-Vacuum EPI N DOUG      VS: Pulse:  [86-96] 88  SpO2:  [99 %-100 %] 99 %  BP: (126-135)/(69-75) 126/69   Complaints: contractions that started becoming painful one hour prior at 0130.       Number of past c-sections:   History of past pregnancy complications:   Pain level on admit: 10  Pain reassessment:   Fetal movement: +  Uterine Activity   Contraction intensity:  Contraction Frequency (minutes): 1 - 3  Contractions per 10 minutes:  Contraction Duration (seconds): 50 - 60  Uterine tone:   FHR baseline: 145  Variability: moderate  Accels: present   Decels: absent  SVE  Dilation: 4-5  Effacement: 70  Station: -3  Name of second nurse verification of strip:

## 2022-11-25 NOTE — SUBJECTIVE & OBJECTIVE
Obstetric HPI:  Patient reports regular and strong contractions, active fetal movement, Yes vaginal bleeding , No loss of fluid     This pregnancy has been complicated by history of  labor and delivery    OB History    Para Term  AB Living   3 2 1 1 0 2   SAB IAB Ectopic Multiple Live Births   0 0 0 0 2      # Outcome Date GA Lbr Nahid/2nd Weight Sex Delivery Anes PTL Lv   3 Current            2  21 34w1d 01:18 / 00:04 1.82 kg (4 lb 0.2 oz) F Vag-Spont EPI Y DOUG      Name: HUAN SON      Apgar1: 9  Apgar5: 9   1 Term 16 38w5d  3.738 kg (8 lb 3.9 oz) M Vag-Vacuum EPI N DOUG      Apgar1: 7  Apgar5: 9     Past Medical History:   Diagnosis Date    Asthma     Hypertension affecting pregnancy in third trimester 2021     History reviewed. No pertinent surgical history.    Facility-Administered Medications Prior to Admission   Medication    HYDROXYprogesterone (DARRON) injection 250 mg     PTA Medications   Medication Sig    acetaminophen (TYLENOL) 500 MG tablet Take 2 tablets (1,000 mg total) by mouth every 6 (six) hours as needed for Pain.    aspirin (ECOTRIN) 81 MG EC tablet Take 1 tablet (81 mg total) by mouth once daily.    fluticasone propionate (FLONASE) 50 mcg/actuation nasal spray 1 spray (50 mcg total) by Each Nostril route 2 (two) times daily as needed.    metroNIDAZOLE (FLAGYL) 500 MG tablet Take 1 tablet (500 mg total) by mouth every 12 (twelve) hours.    promethazine (PHENERGAN) 25 MG tablet Take 1 tablet (25 mg total) by mouth every 6 (six) hours as needed for Nausea.     27 mg iron- 1 mg Tab Take 1 tablet by mouth once daily.       Review of patient's allergies indicates:   Allergen Reactions    Fish containing products Rash        Family History       Problem Relation (Age of Onset)    Diabetes Maternal Grandfather    Hypertension Mother, Maternal Grandmother    Miscarriages / Stillbirths Mother    Seizures Brother    Stroke Maternal Grandfather           Tobacco Use    Smoking status: Never    Smokeless tobacco: Never   Substance and Sexual Activity    Alcohol use: Not Currently    Drug use: Yes     Types: Marijuana    Sexual activity: Yes     Partners: Male     Birth control/protection: None     Review of Systems   Constitutional:  Positive for fatigue. Negative for activity change, appetite change, fever and unexpected weight change.   Respiratory:  Negative for cough, shortness of breath and wheezing.    Cardiovascular:  Negative for chest pain and palpitations.   Gastrointestinal:  Positive for abdominal pain and nausea. Negative for vomiting.   Endocrine: Negative for hot flashes.   Genitourinary:  Positive for frequency, pelvic pain and vaginal discharge. Negative for dysmenorrhea, dyspareunia, urgency, vaginal bleeding and postcoital bleeding.   Musculoskeletal:  Positive for back pain. Negative for myalgias.   Integumentary:  Negative for rash, breast mass and nipple discharge.   Neurological:  Positive for headaches. Negative for seizures.   Psychiatric/Behavioral:  Negative for depression and sleep disturbance. The patient is not nervous/anxious.    Breast: Negative for mass, mastodynia and nipple discharge   Objective:     Vital Signs (Most Recent):  Temp: 98.2 °F (36.8 °C) (11/25/22 0302)  Pulse: 76 (11/25/22 0548)  Resp: (!) 22 (11/25/22 0302)  BP: 118/67 (11/25/22 0548)  SpO2: 97 % (11/25/22 0544)   Vital Signs (24h Range):  Temp:  [98.2 °F (36.8 °C)] 98.2 °F (36.8 °C)  Pulse:  [] 76  Resp:  [22] 22  SpO2:  [94 %-100 %] 97 %  BP: ()/(55-88) 118/67     Weight: 83 kg (182 lb 15.7 oz)  Body mass index is 33.47 kg/m².    FHT: 140 Cat 1 (reassuring)  TOCO:  Q 2-3 minutes    Physical Exam:   Constitutional: She appears well-developed and well-nourished. No distress.    HENT:   Head: Normocephalic and atraumatic.    Eyes: EOM are normal.     Cardiovascular:  Normal rate.             Pulmonary/Chest: Effort normal. No respiratory distress.         Abdominal: Soft. She exhibits no distension. There is no abdominal tenderness. There is no rebound and no guarding.   Gravid             Musculoskeletal: Normal range of motion.       Neurological: She is alert.    Skin: Skin is warm and dry.    Psychiatric: She has a normal mood and affect.     Cervix:  Dilation:  8  Effacement:  100%  Station: 0  Presentation: Vertex     Significant Labs:  Lab Results   Component Value Date    GROUPTRH B POS 10/12/2022    HEPBSAG Negative 05/18/2022    STREPBCULT No Group B Streptococcus isolated 12/17/2015       CBC:   Recent Labs   Lab 11/25/22  0247   WBC 13.37*   RBC 3.71*   HGB 10.1*   HCT 31.6*      MCV 85   MCH 27.2   MCHC 32.0     I have personallly reviewed all pertinent lab results from the last 24 hours.

## 2022-11-25 NOTE — H&P
[facility] - Labor & Delivery  Obstetrics  History & Physical    Patient Name: Manuel Le  MRN: [id number]  Admission Date: 2022  Primary Care Provider: Charles Hein MD    Subjective:     Principal Problem:35 weeks gestation of pregnancy    History of Present Illness:  23 yo  at 35w2d  Previous  labor with cervix at 2-3 cm since 10/15/2022  Previous  delivery  On [place] weekly  Presented earlier this morning with regular contractions and cervix at 5 cm  Admitted for delivery      Obstetric HPI:  Patient reports regular and strong contractions, active fetal movement, Yes vaginal bleeding , No loss of fluid     This pregnancy has been complicated by history of  labor and delivery    OB History    Para Term  AB Living   3 2 1 1 0 2   SAB IAB Ectopic Multiple Live Births   0 0 0 0 2      # Outcome Date GA Lbr Nahid/2nd Weight Sex Delivery Anes PTL Lv   3 Current            2  21 34w1d 01:18 / 00:04 1.82 kg (4 lb 0.2 oz) F Vag-Spont EPI Y DOUG      Name: ADELAIDA,GIRL MANUEL      Apgar1: 9  Apgar5: 9   1 Term 16 38w5d  3.738 kg (8 lb 3.9 oz) M Vag-Vacuum EPI N DOUG      Apgar1: 7  Apgar5: 9     Past Medical History:   Diagnosis Date    Asthma     Hypertension affecting pregnancy in third trimester 2021     History reviewed. No pertinent surgical history.    Facility-Administered Medications Prior to Admission   Medication    HYDROXYprogesterone (DARRON) injection 250 mg     PTA Medications   Medication Sig    acetaminophen (TYLENOL) 500 MG tablet Take 2 tablets (1,000 mg total) by mouth every 6 (six) hours as needed for Pain.    aspirin (ECOTRIN) 81 MG EC tablet Take 1 tablet (81 mg total) by mouth once daily.    fluticasone propionate (FLONASE) 50 mcg/actuation nasal spray 1 spray (50 mcg total) by Each Nostril route 2 (two) times daily as needed.    metroNIDAZOLE (FLAGYL) 500 MG tablet Take 1 tablet (500 mg total) by mouth every 12 (twelve)  hours.    promethazine (PHENERGAN) 25 MG tablet Take 1 tablet (25 mg total) by mouth every 6 (six) hours as needed for Nausea.     27 mg iron- 1 mg Tab Take 1 tablet by mouth once daily.       Review of patient's allergies indicates:   Allergen Reactions    Fish containing products Rash        Family History       Problem Relation (Age of Onset)    Diabetes Maternal Grandfather    Hypertension Mother, Maternal Grandmother    Miscarriages / Stillbirths Mother    Seizures Brother    Stroke Maternal Grandfather          Tobacco Use    Smoking status: Never    Smokeless tobacco: Never   Substance and Sexual Activity    Alcohol use: Not Currently    Drug use: Yes     Types: Marijuana    Sexual activity: Yes     Partners: Male     Birth control/protection: None     Review of Systems   Constitutional:  Positive for fatigue. Negative for activity change, appetite change, fever and unexpected weight change.   Respiratory:  Negative for cough, shortness of breath and wheezing.    Cardiovascular:  Negative for chest pain and palpitations.   Gastrointestinal:  Positive for abdominal pain and nausea. Negative for vomiting.   Endocrine: Negative for hot flashes.   Genitourinary:  Positive for frequency, pelvic pain and vaginal discharge. Negative for dysmenorrhea, dyspareunia, urgency, vaginal bleeding and postcoital bleeding.   Musculoskeletal:  Positive for back pain. Negative for myalgias.   Integumentary:  Negative for rash, breast mass and nipple discharge.   Neurological:  Positive for headaches. Negative for seizures.   Psychiatric/Behavioral:  Negative for depression and sleep disturbance. The patient is not nervous/anxious.    Breast: Negative for mass, mastodynia and nipple discharge   Objective:     Vital Signs (Most Recent):  Temp: 98.2 °F (36.8 °C) (11/25/22 0302)  Pulse: 76 (11/25/22 0548)  Resp: (!) 22 (11/25/22 0302)  BP: 118/67 (11/25/22 0548)  SpO2: 97 % (11/25/22 0544)   Vital Signs (24h  Range):  Temp:  [98.2 °F (36.8 °C)] 98.2 °F (36.8 °C)  Pulse:  [] 76  Resp:  [22] 22  SpO2:  [94 %-100 %] 97 %  BP: ()/(55-88) 118/67     Weight: 83 kg (182 lb 15.7 oz)  Body mass index is 33.47 kg/m².    FHT: 140 Cat 1 (reassuring)  TOCO:  Q 2-3 minutes    Physical Exam:   Constitutional: She appears well-developed and well-nourished. No distress.    HENT:   Head: Normocephalic and atraumatic.    Eyes: EOM are normal.     Cardiovascular:  Normal rate.             Pulmonary/Chest: Effort normal. No respiratory distress.        Abdominal: Soft. She exhibits no distension. There is no abdominal tenderness. There is no rebound and no guarding.   Gravid             Musculoskeletal: Normal range of motion.       Neurological: She is alert.    Skin: Skin is warm and dry.    Psychiatric: She has a normal mood and affect.     Cervix:  Dilation:  8  Effacement:  100%  Station: 0  Presentation: Vertex     Significant Labs:  Lab Results   Component Value Date    GROUPTRH B POS 10/12/2022    HEPBSAG Negative 2022    STREPBCULT No Group B Streptococcus isolated 2015       CBC:   Recent Labs   Lab 22  0247   WBC 13.37*   RBC 3.71*   HGB 10.1*   HCT 31.6*      MCV 85   MCH 27.2   MCHC 32.0     I have personallly reviewed all pertinent lab results from the last 24 hours.    Assessment/Plan:     22 y.o. female  at 35w2d for:    * 35 weeks gestation of pregnancy  Expect  soon     labor in third trimester without delivery  Now in active labor  Expect  soon    History of pregnancy induced hypertension  Monitor blood pressure        Mingo Santamaria MD  Obstetrics  [facility] [place] - Labor & Delivery

## 2022-11-25 NOTE — HOSPITAL COURSE
Patient progressed well  Epidural per request  AROM by me at 0632 2022.  Clear with cervix at 8 cm  Complete at 0700    Viable male infant at 0711 22  Weight:  Apgar:   Placenta: spontaneous and intact with three vessels  EBL: 300 cc  Small lacerations on hymen.  Repaired with 3.0 chromic  No complication  No specimen    Mingo Santamaria MD    22:  pt denies any c/o; ambulating well; pt states her pain is inadequately controlled with motrin; pt is breast feeding    22:  pt to be discharged today; pt very concerned about what meds she will be discharged

## 2022-11-25 NOTE — ANESTHESIA PREPROCEDURE EVALUATION
"                                                                                                             2022  Fiona Le is a 22 y.o., female.  Pre-operative evaluation for EPIDURAL     @ 35 wga in labor.    Vitals:    22 0238 22 0239 22 0302 22 0303   BP:  126/69 130/83    Pulse: 96 88 86 94   Resp:   (!) 22    Temp:   36.8 °C (98.2 °F)    TempSrc:   Oral    SpO2: 99%   100%   Weight:   83 kg (182 lb 15.7 oz)    Height:   5' 2" (1.575 m)          Fiona Le is a 22 y.o. female     Patient Active Problem List   Diagnosis    History of pregnancy induced hypertension     labor in third trimester without delivery       Review of patient's allergies indicates:   Allergen Reactions    Fish containing products Rash       No current facility-administered medications on file prior to encounter.     Current Outpatient Medications on File Prior to Encounter   Medication Sig Dispense Refill    acetaminophen (TYLENOL) 500 MG tablet Take 2 tablets (1,000 mg total) by mouth every 6 (six) hours as needed for Pain. 30 tablet 1    aspirin (ECOTRIN) 81 MG EC tablet Take 1 tablet (81 mg total) by mouth once daily. 150 tablet 2    fluticasone propionate (FLONASE) 50 mcg/actuation nasal spray 1 spray (50 mcg total) by Each Nostril route 2 (two) times daily as needed. 15 g 0    metroNIDAZOLE (FLAGYL) 500 MG tablet Take 1 tablet (500 mg total) by mouth every 12 (twelve) hours. 14 tablet 0    promethazine (PHENERGAN) 25 MG tablet Take 1 tablet (25 mg total) by mouth every 6 (six) hours as needed for Nausea. 30 tablet 1     27 mg iron- 1 mg Tab Take 1 tablet by mouth once daily.         History reviewed. No pertinent surgical history.    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        Diagnostic Studies:      EKD Echo:  No results found for this or any previous visit.        Pre-op Assessment    I have reviewed the Patient Summary Reports.    I have reviewed the NPO Status.   I have reviewed the Medications.     Review of Systems  Anesthesia Hx:  No problems with previous Anesthesia  Neg history of prior surgery.  Denies Personal Hx of Anesthesia complications.   Social:  Non-Smoker THC use   EENT/Dental:EENT/Dental Normal   Cardiovascular:   Exercise tolerance: good Hypertension    Pulmonary:   Asthma    Hepatic/GI:  Hepatic/GI Normal    OB/GYN/PEDS:  PTL at 35 wga   Neurological:  Neurology Normal    Endocrine:  Endocrine Normal        Physical Exam  General: Cooperative, Alert and Oriented    Airway:  Mallampati: III   Mouth Opening: Normal  TM Distance: > 6 cm  Tongue: Normal    Dental:  Intact        Anesthesia Plan  Type of Anesthesia, risks & benefits discussed:    Anesthesia Type: Epidural  Intra-op Monitoring Plan: Standard ASA Monitors  Post Op Pain Control Plan: epidural analgesia  Informed Consent: Informed consent signed with the Patient and all parties understand the risks and agree with anesthesia plan.  All questions answered. Patient consented to blood products? Yes  ASA Score: 2    Ready For Surgery From  Anesthesia Perspective.     .

## 2022-11-25 NOTE — ANESTHESIA PROCEDURE NOTES
Epidural    Patient location during procedure: OB   Reason for block: primary anesthetic   Reason for block: labor analgesia requested by patient and obstetrician  Diagnosis: IUP-Labor   Start time: 11/25/2022 4:20 AM  Timeout: 11/25/2022 4:20 AM  End time: 11/25/2022 4:30 AM    Staffing  Performing Provider: Elsy Keita MD  Authorizing Provider: Elsy Keita MD        Preanesthetic Checklist  Completed: patient identified, IV checked, site marked, risks and benefits discussed, surgical consent, monitors and equipment checked, pre-op evaluation, timeout performed, anesthesia consent given, hand hygiene performed and patient being monitored  Preparation  Patient position: sitting  Prep: ChloraPrep  Patient monitoring: Pulse Ox and Blood Pressure  Reason for block: primary anesthetic   Epidural  Skin Anesthetic: lidocaine 1%  Skin Wheal: 3 mL  Administration type: continuous  Approach: midline  Interspace: L2-3    Injection technique: SEGUNDO saline  Needle and Epidural Catheter  Needle type: Tuohy   Needle gauge: 17  Needle length: 3.5 inches  Needle insertion depth: 6 cm  Catheter type: springwound and multi-orifice  Catheter size: 19 G  Catheter at skin depth: 12 cm  Insertion Attempts: 1  Test dose: 3 mL of lidocaine 1.5% with Epi 1-to-200,000  Additional Documentation: incremental injection, no paresthesia on injection, no significant pain on injection, no signs/symptoms of IV or SA injection, negative aspiration for heme and CSF and no significant complaints from patient  Needle localization: anatomical landmarks  Assessment  Upper dermatomal levels - Left: T10  Right: T10   Dermatomal levels determined by alcohol wipe  Ease of block: easy  Patient's tolerance of the procedure: comfortable throughout block and no complaints No inadvertent dural puncture with Tuohy.  Dural puncture not performed with spinal needle

## 2022-11-25 NOTE — DISCHARGE INSTRUCTIONS
Breastfeeding discharge instructions given with First Alert form and reviewed. Please complete First Alert within 3-5 days after the baby's birth. ( Please call the Breastfeeding Warmline ( [phone]) or the baby's pediatrician if you have any concerns.     Also discussed:  AAP recommendation of exclusive breastfeeding for the first 6 months of life and continued breastfeeding with the introduction of supplemental foods beyond the first year of life. The AAP recommends breastfeeding for for at least a year or longer. Instructed on the recommendation to delay all bottle and pacifier use until after 4 weeks of age and breastfeeding is well established.  Discussed the benefits of exclusive breastfeeding for both mother and baby.  Discussed the risks of supplementation/pacifier use on the exclusivity of breastfeeding in the first 6 months. Feed the baby at the earliest sign of hunger or comfort  Hands to mouth, sucking motions  Rooting or searching for something to suck on  Dont wait for crying - it is a not a late sign of hunger; it is a sign of distress    The feedings may be 8-12 times per 24hrs and will not follow a schedule  Alternate the breast you start the feeding with, or start with the breast that feels the fullest  Switch breasts when the baby takes himself off the breast or falls asleep  Keep offering breasts until the baby looks full, no longer gives hunger signs, and stays asleep when placed on his back in the crib  If the baby is sleepy and wont wake for a feeding, put the baby skin-to-skin dressed in a diaper against the mothers bare chest  Sleep near your baby  The baby should be positioned and latched on to the breast correctly  Chest-to-chest, chin in the breast  Babys lips are flipped outward  Babys mouth is stretched open wide like a shout  Babys sucking should feel like tugging to the mother  The baby should be drinking at the breast:  You should hear swallowing or gulping throughout  the feeding  You should see milk on the babys lips when he comes off the breast  Your breasts should be softer when the baby is finished feeding  The baby should look relaxed at the end of feedings  After the 4th day and your milk is in:  The babys poop should turn bright yellow and be loose, watery, and seedy  The baby should have at least 3-4 poops the size of the palm of your hand per day  The baby should have at least 6-8 wet diapers per day  The urine should be light yellow in color  You should drink when you are thirsty and eat a healthy diet when you are    hungry.     Take naps to get the rest you need.   Take medications and/or drink alcohol only with permission of your obstetrician    or the babys pediatrician.  You can also call the [facility],   ([phone]), Monday-Friday, 8am-5pm Central time, to get the most   up-to-date evidence-based information on the use of medications during   pregnancy and breastfeeding.      The baby should be examined by a pediatrician at 3-5 days of age and again at 2 weeks of age unless ordered sooner by the pediatrician.  Once your milk production increases the baby should gain at least 1/2-1oz each day and should be back to birth weight no later than 10-14 days of age.If this is not the case, please call the Breastfeeding Warmline ( [phone]) for assistance and support. Instructed on primary engorgement and precautions.  Discussed:    Typical timing of the onset of engorgement  Signs and symptoms of engorgement  If the milk is flowing, use wet or dry heat applied to the breasts for approximately 10min prior to each feeding as a comfort measure to facilitate the milk ejection reflex    Follow heat treatment with breast massage to soften hard/lumpy areas of the breast    Use unrestricted, frequent, effective feedings    Wake baby to feed if necessary    Avoid pacifier and bottle feedings    Hand express or pump breasts to the point of comfort as  needed    Use cold treatments in the form of ice packs/gel packs/ frozen vegetables wrapped in a soft thin cloth and applied to the breasts for approximately 20min after each feeding until engorgement is resolved    Wear comfortable, supportive bra    Take pain medicine as needed    Use anti-inflammatory medications if prescribed by physician Preparation and Hygiene:  Shower daily.  Wear a clean bra each day and wash daily in warm soapy water.  Change wet or moist breast pads frequently.  Moist pads can promote growth of germs.  Actively wash your hands, paying close attention to the area around and under your fingernails, thoroughly with soap and water for 15 seconds before pumping or handling your milk.  Re-wash your hands if you touch anything (scratching your nose, answering the phone, etc) while pumping or handling your milk.   Before pumping your breasts, assemble the pump collection kit and have ready the sterile container and labels.  Place these items on a clean surface next to the breast pump.  Each time after you have finished pumping, take apart all of the parts of the breast pump collection kit and place them in a separate cleaning container (do not place them in the sink).  Be sure to remove the yellow valve from the breast shield and separate the white membrane from the yellow valve.  Rinse all of these parts with cool water.  Then use a new sponge and/or bottle brush and dishwashing detergent to clean the parts.  Rinse off the soapy water with cool water and air dry on a clean towel covered with a clean cloth.  All parts may also be washed after each use in the top rack of a .  Once each day, sanitize all of the parts of the breast pump collection kit.  This can be done by boiling the kit parts for 10 minutes or by using a Quick Clean Micro-Steam Bag made by [employer]  If condensation appears in the tubing, continue to run the pump with the tubing attached for 1-2 minutes or until the  tubing is dry.   Notify your babys nurse or doctor if you become ill or need to take any medication, even over-the-counter medicines.  Collection and Storage of Expressed Breast milk:         1. Pump your breasts at least 8 or more times every 24 hours.  Double pump (both breasts at the same time) for at least 15-20 minutes using the most suction that is comfortable.    2. Write the date and time of pumping and the name of any medications you are taking on the babys pre-printed hospital identification label.   3.    Do not touch the inside of the storage containers or lids.  4.        Tightly screw the lid onto the container and place immediately into the refrigerator for daily use.  5.    Expressed breast milk should be refrigerated or frozen within 4 hours of pumping.  6.        Do not store expressed breast milk on the door of your refrigerator or freezer             where the temperature is warmer.   7.        Refrigerated milk may be stored for up to 7 days.  At this point it can be moved to the freezer for 6 -12 months.  8.        Thaw frozen breast milk in overnight in the refrigerator.  Once milk is thawed it must be used within 24 hours.  9.        Refrigerated breast milk needs to be warmed to room temperature.  Warm by leaving unrefrigerated until it reached room temperature, or place sealed bottle into a cup of warm (not boiling) water or use a bottle warmer.               Never warm breast milk in a microwave or boiling water.    For any questions or concerns call the Lactation Department at [phone]     After a Vaginal Birth    General Discharge Instructions    May follow a regular diet, unless otherwise discussed with physician.  Take showers, not baths unless otherwise discussed with physician.  Activity as tolerated.  No lifting or heavy exercise for 6 weeks, no driving for 2 weeks, no sexual intercourse, douching or tampons for 6 weeks  Rhogam injection given  _____  Rubella vaccine given  _____  (avoid pregnancy for 3 mos. after injection)  May return to work/school as discussed with physician  Take medications as directed  Discuss birth control with physician  Breast care support bra worn at all times  Lactation consultant referral number ( [phone] or [phone])    Call Your Healthcare Provider Right Away If You Have:  A temperature of 100.4°F or higher.  If your blood pressure is over 155/105.  You have difficulty catching your breath or trouble breathing.  Heavy vaginal bleeding, clots, or vaginal discharge with foul odor. (heavier than menses)  Persistent nausea or vomiting.  You gain more than 3 pounds in 3 days.  Severe headaches not relieved by Tylenol (acetaminophen) or Motrin (ibuprofen)  Blurry or double vision, see spots or flashing lights.  Dizziness or fainting.  New onset swelling or worsening of existing swelling.  Burning or pain when you urinate.  No bowel movement for 5 days.  Redness, warmth, swelling, or pain in the lower leg.  Redness, discharge, or pain worse than you had in the hospital.  Burning, pain, red streaks, or lumpy areas in your breasts.  Cracks, blisters, or blood on your nipples.  Feelings of extreme sadness or anxiety, or a feeling that you dont want to be with your baby.  If you have any new or unusual symptoms or have questions or concerns    Some of these symptoms can occur up to 4 to 6 weeks after delivery. This can be a sign of pre-eclampsia, which is a serious disease that can cause stroke, seizures, organ damage, or death. Do not wait to call your doctor or seek medical attention.    If You Had Stitches  You may have received stitches in the skin near your vagina. The stitches might have closed an episiotomy (an incision that enlarges the opening of the vagina). Or you may have needed stitches to repair torn skin. Either way, your stitches should dissolve within weeks. Until then, you can help reduce discomfort, aid healing, and reduce your risk of  infection by keeping the stitches clean. These tips can help:    Gently wipe from front to back after you urinate or have a bowel movement.  After wiping, spray warm water on the area. Or you can have a sitz bath. This means sitting in a tub with a few inches of water in it. Then pat the area dry or use a hairdryer on a cool setting.  You can take a shower instead of a bath.  Change sanitary pads at least every 2-4 hours.  Place cold or heat packs on the area as directed by your doctors or nurses. Keep a thin towel between the pack and your skin.  Sit on firm seats so the stitches pull less.     Follow-Up  Schedule a  follow-up exam with your healthcare provider for about 6 weeks after delivery. During this exam, your uterus and vaginal area will be checked. Contact your healthcare provider if you think you or your baby are having any problems.

## 2022-11-25 NOTE — PLAN OF CARE
VSS, breastfeeding on demand with assistance, encouraged 8 times in 24 hours. Supportive bra encouraged. Fundus and lochia WDL. Voiding, passing flatus, ambulating and tolerating regular diet well. Bonding well with baby. Pain being managed with motrin and percocet as needed. Stated an understanding to POC. SO at bedside assisting with needs.

## 2022-11-25 NOTE — L&D DELIVERY NOTE
[facility] - Labor & Delivery  Vaginal Delivery   Operative Note    SUMMARY     Normal spontaneous vaginal delivery of live infant, was placed on mothers abdomen for skin to skin and bulb suctioning performed.  Infant delivered position OA over intact perineum.  Nuchal cord: No.    Spontaneous delivery of placenta and IV pitocin given noting good uterine tone.  1st degree laceration noted.  Patient tolerated delivery well. Sponge needle and lap counted correctly x2.    Indications: Status post normal delivery in completely normal case  Pregnancy complicated by:   Patient Active Problem List   Diagnosis    History of pregnancy induced hypertension    Status post normal delivery in completely normal case     Admitting GA: 35w2d    Delivery Information for Tyrel Le    Birth information:  YOB: 2023   Time of birth: 7:11 AM   Sex: male   Head Delivery Date/Time:     Delivery type:    Gestational Age: 35w2d    Delivery Providers    Delivering clinician:            Measurements    Weight:   Length:          Apgars    Living status:   Apgars:  1 min.:  5 min.:  10 min.:  15 min.:  20 min.:    Skin color:         Heart rate:         Reflex irritability:         Muscle tone:         Respiratory effort:         Total:                                  Interventions/Resuscitation           Cord    No data filed       Placenta    Placenta delivery date/time:   Placenta removal:            Labor Events:       labor:       Labor Onset Date/Time:         Dilation Complete Date/Time:         Start Pushing Date/Time:         Start Pushing Date/Time:       Rupture Date/Time: 22  0632         Rupture type:            Fluid Amount:         Fluid Color: Clear        Fluid Odor:         Membrane Status: ARM (Artificial Rupture)                 steroids:       Antibiotics given for GBS:       Induction:       Indications for induction:        Augmentation:       Indications for augmentation:        Labor complications:       Additional complications:          Cervical ripening:                     Delivery:      Episiotomy:       Indication for Episiotomy:       Perineal Lacerations:   Repaired:      Periurethral Laceration:   Repaired:     Labial Laceration:   Repaired:     Sulcus Laceration:   Repaired:     Vaginal Laceration:   Repaired:     Cervical Laceration:   Repaired:     Repair suture:       Repair # of packets:       Last Value - EBL - Nursing (mL):       Sum - EBL - Nursing (mL): 0     Last Value - EBL - Anesthesia (mL):        Calculated QBL (mL):         Vaginal Sweep Performed:       Surgicount Correct:         Other providers:            Details (if applicable):  Trial of Labor      Categorization:      Priority:     Indications for :     Incision Type:       Additional  information:  Forceps:    Vacuum:    Breech:    Observed anomalies    Other (Comments):

## 2022-11-25 NOTE — HPI
21 yo  at 35w2d  Previous  labor with cervix at 2-3 cm since 10/15/2022  Previous  delivery  On [place] weekly  Presented earlier this morning with regular contractions and cervix at 5 cm  Admitted for delivery

## 2022-11-25 NOTE — LACTATION NOTE
22 0737   Maternal Assessment   Breast Density Bilateral:;soft   Areola Bilateral:;elastic   Nipples Bilateral:;everted   Maternal Infant Feeding   Maternal Emotional State assist needed;relaxed   Infant Positioning cradle   Signs of Milk Transfer audible swallow;infant jaw motion present   Pain with Feeding no   Latch Assistance yes   Mother with baby skin to skin and starting to root for breast -assistance given with postioning and latch of 35 week infant -baby with strong sucking and swallows noted -mother states able to express colostrum during pregnancy -denies discomfort with feeding -pumped for last  baby in NICU -reviewed basic breastfeeding information and encouraged breastfeed at least every 2-3 hours today and wake baby for feedings today

## 2022-11-26 LAB
BASOPHILS # BLD AUTO: 0.03 K/UL (ref 0–0.2)
BASOPHILS NFR BLD: 0.2 % (ref 0–1.9)
DIFFERENTIAL METHOD: ABNORMAL
EOSINOPHIL # BLD AUTO: 0.1 K/UL (ref 0–0.5)
EOSINOPHIL NFR BLD: 0.8 % (ref 0–8)
ERYTHROCYTE [DISTWIDTH] IN BLOOD BY AUTOMATED COUNT: 13.6 % (ref 11.5–14.5)
HCT VFR BLD AUTO: 32.4 % (ref 37–48.5)
HGB BLD-MCNC: 10.3 G/DL (ref 12–16)
IMM GRANULOCYTES # BLD AUTO: 0.09 K/UL (ref 0–0.04)
IMM GRANULOCYTES NFR BLD AUTO: 0.6 % (ref 0–0.5)
LYMPHOCYTES # BLD AUTO: 3.6 K/UL (ref 1–4.8)
LYMPHOCYTES NFR BLD: 25.1 % (ref 18–48)
MCH RBC QN AUTO: 27.3 PG (ref 27–31)
MCHC RBC AUTO-ENTMCNC: 31.8 G/DL (ref 32–36)
MCV RBC AUTO: 86 FL (ref 82–98)
MONOCYTES # BLD AUTO: 0.9 K/UL (ref 0.3–1)
MONOCYTES NFR BLD: 6 % (ref 4–15)
NEUTROPHILS # BLD AUTO: 9.6 K/UL (ref 1.8–7.7)
NEUTROPHILS NFR BLD: 67.3 % (ref 38–73)
NRBC BLD-RTO: 0 /100 WBC
PLATELET # BLD AUTO: 214 K/UL (ref 150–450)
PMV BLD AUTO: 11 FL (ref 9.2–12.9)
RBC # BLD AUTO: 3.77 M/UL (ref 4–5.4)
RPR SER QL: NORMAL
WBC # BLD AUTO: 14.21 K/UL (ref 3.9–12.7)

## 2022-11-26 PROCEDURE — 25000003 PHARM REV CODE 250: Performed by: OBSTETRICS & GYNECOLOGY

## 2022-11-26 PROCEDURE — 85025 COMPLETE CBC W/AUTO DIFF WBC: CPT | Performed by: OBSTETRICS & GYNECOLOGY

## 2022-11-26 PROCEDURE — 11000001 HC ACUTE MED/SURG PRIVATE ROOM

## 2022-11-26 PROCEDURE — 36415 COLL VENOUS BLD VENIPUNCTURE: CPT | Performed by: OBSTETRICS & GYNECOLOGY

## 2022-11-26 PROCEDURE — 99231 PR SUBSEQUENT HOSPITAL CARE,LEVL I: ICD-10-PCS | Mod: ,,, | Performed by: OBSTETRICS & GYNECOLOGY

## 2022-11-26 PROCEDURE — 99231 SBSQ HOSP IP/OBS SF/LOW 25: CPT | Mod: ,,, | Performed by: OBSTETRICS & GYNECOLOGY

## 2022-11-26 RX ADMIN — OXYCODONE AND ACETAMINOPHEN 1 TABLET: 5; 325 TABLET ORAL at 04:11

## 2022-11-26 RX ADMIN — IBUPROFEN 600 MG: 600 TABLET ORAL at 05:11

## 2022-11-26 RX ADMIN — OXYCODONE AND ACETAMINOPHEN 1 TABLET: 5; 325 TABLET ORAL at 05:11

## 2022-11-26 RX ADMIN — IBUPROFEN 600 MG: 600 TABLET ORAL at 12:11

## 2022-11-26 RX ADMIN — OXYCODONE AND ACETAMINOPHEN 1 TABLET: 5; 325 TABLET ORAL at 10:11

## 2022-11-26 RX ADMIN — IBUPROFEN 600 MG: 600 TABLET ORAL at 11:11

## 2022-11-26 RX ADMIN — OXYCODONE AND ACETAMINOPHEN 1 TABLET: 5; 325 TABLET ORAL at 12:11

## 2022-11-26 RX ADMIN — PRENATAL VIT W/ FE FUMARATE-FA TAB 27-0.8 MG 1 TABLET: 27-0.8 TAB at 09:11

## 2022-11-26 NOTE — PROGRESS NOTES
[facility]  Obstetrics  Postpartum Progress Note    Patient Name: Fiona Le  MRN: [id number]  Admission Date: 2022  Hospital Length of Stay: 1 days  Attending Physician: Mingo Santamaria MD  Primary Care Provider: Charles Hein MD    Subjective:     Principal Problem:Status post normal delivery in completely normal case    Hospital Course:  Patient progressed well  Epidural per request  AROM by me at 0632 2022.  Clear with cervix at 8 cm  Complete at 0700    Viable male infant at 0711 22  Weight:  Apgar: 9/9  Placenta: spontaneous and intact with three vessels  EBL: 300 cc  Small lacerations on hymen.  Repaired with 3.0 chromic  No complication  No specimen    Mingo Santamaria MD    22:  pt denies any c/o; ambulating well; pt states her pain is inadequately controlled with motrin; pt is breast feeding      Interval History: PPD #1 s/p     She is doing well this morning. She is tolerating a regular diet without nausea or vomiting. She is voiding spontaneously. She is ambulating. She has passed flatus, and has not a BM. Vaginal bleeding is mild. She denies fever or chills. Abdominal pain is mild and controlled with oral medications. She Is breastfeeding. She desires circumcision for her male baby: yes.    Objective:     Vital Signs (Most Recent):  Temp: 98.7 °F (37.1 °C) (22 1202)  Pulse: 80 (22 1202)  Resp: 18 (22 1219)  BP: 134/61 (22 1202)  SpO2: 97 % (22 1202) Vital Signs (24h Range):  Temp:  [98 °F (36.7 °C)-99.2 °F (37.3 °C)] 98.7 °F (37.1 °C)  Pulse:  [74-84] 80  Resp:  [16-20] 18  SpO2:  [97 %-99 %] 97 %  BP: (107-134)/(59-74) 134/61     Weight: 83 kg (182 lb 15.7 oz)  Body mass index is 33.47 kg/m².      Intake/Output Summary (Last 24 hours) at 2022 1306  Last data filed at 2022 0503  Gross per 24 hour   Intake --   Output 2100 ml   Net -2100 ml         Significant Labs:  Lab Results   Component Value Date    CRISTIN ONEIL  2022    HEPBSAG Negative 2022    STREPBCULT No Group B Streptococcus isolated 2015     Recent Labs   Lab 22  0511   HGB 10.3*   HCT 32.4*       I have personallly reviewed all pertinent lab results from the last 24 hours.    Physical Exam:   Constitutional: She is oriented to person, place, and time. She appears well-developed and well-nourished. No distress.    HENT:   Head: Normocephalic and atraumatic.       Pulmonary/Chest: Effort normal. No respiratory distress.        Abdominal: Soft. She exhibits no distension and no mass. There is no abdominal tenderness. There is no rebound and no guarding.             Musculoskeletal: Normal range of motion and moves all extremeties. No tenderness.       Neurological: She is alert and oriented to person, place, and time. No cranial nerve deficit. Coordination normal.    Skin: She is not diaphoretic.    Psychiatric: She has a normal mood and affect. Her behavior is normal. Judgment and thought content normal.     Review of Systems    Assessment/Plan:     22 y.o. female  for:    * Status post normal delivery in completely normal case  Routine pp care, dismiss tomorrow    History of pregnancy induced hypertension  Monitor blood pressure, so far BP's WNL        Disposition: As patient meets milestones, will plan to discharge tomorrow.    Demetris Ortiz MD  Obstetrics  [facility] - [facility]

## 2022-11-26 NOTE — PLAN OF CARE
VSS, breastfeeding on demand, encouraged 8 times in 24 hours, wearing supportive bra. Fundus and lochia WDL. Voiding, passing flatus, ambulating and tolerating regular diet well. Bonding well with baby. Pain being managed with motrin and percocet as needed. Stated an understanding to POC.

## 2022-11-26 NOTE — ANESTHESIA POSTPROCEDURE EVALUATION
Anesthesia Post Evaluation    Patient: Fiona Le    Procedure(s) Performed:EPIDURAL  Final Anesthesia Type: epidural      Patient location during evaluation: labor & delivery  Patient participation: Yes- Able to Participate  Level of consciousness: awake and alert  Post-procedure vital signs: reviewed and stable  Pain management: adequate  Airway patency: patent  BRIDGETT mitigation strategies: Use of major conduction anesthesia (spinal/epidural) or peripheral nerve block  PONV status at discharge: No PONV  Anesthetic complications: no      Cardiovascular status: blood pressure returned to baseline  Respiratory status: unassisted and spontaneous ventilation  Hydration status: euvolemic  Follow-up not needed.          Vitals Value Taken Time   /69 11/26/22 0836   Temp 37 °C (98.6 °F) 11/26/22 0836   Pulse 76 11/26/22 0836   Resp 17 11/26/22 0836   SpO2 97 % 11/26/22 0836         No case tracking events are documented in the log.      Pain/Uday Score: Pain Rating Prior to Med Admin: 5 (11/26/2022  5:30 AM)  Pain Rating Post Med Admin: 0 (11/26/2022  6:23 AM)

## 2022-11-26 NOTE — SUBJECTIVE & OBJECTIVE
Interval History: PPD #1 s/p     She is doing well this morning. She is tolerating a regular diet without nausea or vomiting. She is voiding spontaneously. She is ambulating. She has passed flatus, and has not a BM. Vaginal bleeding is mild. She denies fever or chills. Abdominal pain is mild and controlled with oral medications. She Is breastfeeding. She desires circumcision for her male baby: yes.    Objective:     Vital Signs (Most Recent):  Temp: 98.7 °F (37.1 °C) (22 1202)  Pulse: 80 (22 1202)  Resp: 18 (22 1219)  BP: 134/61 (22 1202)  SpO2: 97 % (22 1202) Vital Signs (24h Range):  Temp:  [98 °F (36.7 °C)-99.2 °F (37.3 °C)] 98.7 °F (37.1 °C)  Pulse:  [74-84] 80  Resp:  [16-20] 18  SpO2:  [97 %-99 %] 97 %  BP: (107-134)/(59-74) 134/61     Weight: 83 kg (182 lb 15.7 oz)  Body mass index is 33.47 kg/m².      Intake/Output Summary (Last 24 hours) at 2022 1306  Last data filed at 2022 0503  Gross per 24 hour   Intake --   Output 2100 ml   Net -2100 ml         Significant Labs:  Lab Results   Component Value Date    GROUPTRH B POS 2022    HEPBSAG Negative 2022    STREPBCULT No Group B Streptococcus isolated 2015     Recent Labs   Lab 22  0511   HGB 10.3*   HCT 32.4*       I have personallly reviewed all pertinent lab results from the last 24 hours.    Physical Exam:   Constitutional: She is oriented to person, place, and time. She appears well-developed and well-nourished. No distress.    HENT:   Head: Normocephalic and atraumatic.       Pulmonary/Chest: Effort normal. No respiratory distress.        Abdominal: Soft. She exhibits no distension and no mass. There is no abdominal tenderness. There is no rebound and no guarding.             Musculoskeletal: Normal range of motion and moves all extremeties. No tenderness.       Neurological: She is alert and oriented to person, place, and time. No cranial nerve deficit. Coordination normal.    Skin: She  is not diaphoretic.    Psychiatric: She has a normal mood and affect. Her behavior is normal. Judgment and thought content normal.     Review of Systems

## 2022-11-27 VITALS
OXYGEN SATURATION: 98 % | BODY MASS INDEX: 33.68 KG/M2 | HEART RATE: 77 BPM | DIASTOLIC BLOOD PRESSURE: 53 MMHG | RESPIRATION RATE: 18 BRPM | TEMPERATURE: 98 F | WEIGHT: 183 LBS | HEIGHT: 62 IN | SYSTOLIC BLOOD PRESSURE: 100 MMHG

## 2022-11-27 PROCEDURE — 99238 HOSP IP/OBS DSCHRG MGMT 30/<: CPT | Mod: ,,, | Performed by: OBSTETRICS & GYNECOLOGY

## 2022-11-27 PROCEDURE — 99238 PR HOSPITAL DISCHARGE DAY,<30 MIN: ICD-10-PCS | Mod: ,,, | Performed by: OBSTETRICS & GYNECOLOGY

## 2022-11-27 PROCEDURE — 25000003 PHARM REV CODE 250: Performed by: OBSTETRICS & GYNECOLOGY

## 2022-11-27 RX ORDER — OXYCODONE AND ACETAMINOPHEN 5; 325 MG/1; MG/1
1 TABLET ORAL EVERY 8 HOURS PRN
Qty: 10 TABLET | Refills: 0 | Status: SHIPPED | OUTPATIENT
Start: 2022-11-27 | End: 2022-12-27

## 2022-11-27 RX ORDER — IBUPROFEN 800 MG/1
800 TABLET ORAL 3 TIMES DAILY
Qty: 30 TABLET | Refills: 0 | Status: SHIPPED | OUTPATIENT
Start: 2022-11-27 | End: 2022-12-27

## 2022-11-27 RX ADMIN — IBUPROFEN 600 MG: 600 TABLET ORAL at 05:11

## 2022-11-27 RX ADMIN — IBUPROFEN 600 MG: 600 TABLET ORAL at 01:11

## 2022-11-27 RX ADMIN — PRENATAL VIT W/ FE FUMARATE-FA TAB 27-0.8 MG 1 TABLET: 27-0.8 TAB at 09:11

## 2022-11-27 RX ADMIN — OXYCODONE AND ACETAMINOPHEN 1 TABLET: 5; 325 TABLET ORAL at 05:11

## 2022-11-27 NOTE — PROGRESS NOTES
[facility]  Obstetrics  Postpartum Progress Note    Patient Name: Fiona Le  MRN: [id number]  Admission Date: 2022  Hospital Length of Stay: 2 days  Attending Physician: Mingo Santamaria MD  Primary Care Provider: Charles Hein MD    Subjective:     Principal Problem:Status post normal delivery in completely normal case    Hospital Course:  Patient progressed well  Epidural per request  AROM by me at 0632 2022.  Clear with cervix at 8 cm  Complete at 0700    Viable male infant at 0711 22  Weight:  Apgar: 99  Placenta: spontaneous and intact with three vessels  EBL: 300 cc  Small lacerations on hymen.  Repaired with 3.0 chromic  No complication  No specimen    Mingo Santamaria MD    22:  pt denies any c/o; ambulating well; pt states her pain is inadequately controlled with motrin; pt is breast feeding    22:  pt to be discharged today; pt very concerned about what meds she will be discharged      Interval History: POD #2 s/p     She is doing well this morning. She is tolerating a regular diet without nausea or vomiting. She is voiding spontaneously. She is ambulating. She has passed flatus, and has not a BM. Vaginal bleeding is mild. She denies fever or chills. Abdominal pain is mild and controlled with oral medications. She Is breastfeeding. She desires circumcision for her male baby: yes.    Objective:     Vital Signs (Most Recent):  Temp: 98.3 °F (36.8 °C) (22)  Pulse: 77 (22)  Resp: 18 (22)  BP: (!) 100/53 (22)  SpO2: 98 % (22)   Vital Signs (24h Range):  Temp:  [98.3 °F (36.8 °C)-98.7 °F (37.1 °C)] 98.3 °F (36.8 °C)  Pulse:  [75-98] 77  Resp:  [17-20] 18  SpO2:  [97 %-98 %] 98 %  BP: (100-134)/(53-69) 100/53     Weight: 83 kg (182 lb 15.7 oz)  Body mass index is 33.47 kg/m².    No intake or output data in the 24 hours ending 22 0922      Significant Labs:  Lab Results   Component Value Date    [facility]  [facility]    HEPBSAG Negative 2022    STREPBCULT No Group B Streptococcus isolated 2015     Recent Labs   Lab 22  0511   HGB 10.3*   HCT 32.4*       I have personallly reviewed all pertinent lab results from the last 24 hours.    Physical Exam:   Constitutional: She is oriented to person, place, and time. She appears well-developed and well-nourished. No distress.    HENT:   Head: Normocephalic and atraumatic.       Pulmonary/Chest: Effort normal. No respiratory distress.        Abdominal: Soft. She exhibits no distension and no mass. There is no abdominal tenderness. There is no rebound and no guarding.             Musculoskeletal: Normal range of motion and moves all extremeties. No tenderness.       Neurological: She is alert and oriented to person, place, and time. No cranial nerve deficit. Coordination normal.    Skin: She is not diaphoretic.    Psychiatric: She has a normal mood and affect. Her behavior is normal. Judgment and thought content normal.       Assessment/Plan:     22 y.o. female  for:    * Status post normal delivery in completely normal case  Routine pp care, dismiss tomorrow    History of pregnancy induced hypertension  Monitor blood pressure, so far BP's WNL        Disposition: As patient meets milestones, will plan to discharge today.    Demetris Ortiz MD  Obstetrics  [facility] - [facility]

## 2022-11-27 NOTE — PLAN OF CARE
Patient discharged per order. VS stable with no signs of distress. Discharge paperwork printed and reviewed with patient. Reviewed urgent maternal warning signs and instructed to go to ER or call physician if symptoms occur. Reviewed community resources available in the back of the breastfeeding guide. Instructed on follow-up appointment with physician. Patient voiced understanding of all.

## 2022-11-27 NOTE — PLAN OF CARE
Problem: Adult Inpatient Plan of Care  Goal: Plan of Care Review  Outcome: Ongoing, Progressing  Goal: Patient-Specific Goal (Individualized)  Outcome: Ongoing, Progressing  Goal: Absence of Hospital-Acquired Illness or Injury  Outcome: Ongoing, Progressing  Goal: Optimal Comfort and Wellbeing  Outcome: Ongoing, Progressing  Goal: Readiness for Transition of Care  Outcome: Ongoing, Progressing     Problem:  Fall Injury Risk  Goal: Absence of Fall, Infant Drop and Related Injury  Outcome: Ongoing, Progressing     Problem: Infection  Goal: Absence of Infection Signs and Symptoms  Outcome: Ongoing, Progressing     Problem: Breastfeeding  Goal: Effective Breastfeeding  Outcome: Ongoing, Progressing     Problem: Adjustment to Role Transition (Postpartum Vaginal Delivery)  Goal: Successful Maternal Role Transition  Outcome: Ongoing, Progressing     Problem: Bleeding (Postpartum Vaginal Delivery)  Goal: Hemostasis  Outcome: Ongoing, Progressing     Problem: Infection (Postpartum Vaginal Delivery)  Goal: Absence of Infection Signs/Symptoms  Outcome: Ongoing, Progressing     Problem: Pain (Postpartum Vaginal Delivery)  Goal: Acceptable Pain Control  Outcome: Ongoing, Progressing

## 2022-11-27 NOTE — SUBJECTIVE & OBJECTIVE
Interval History: POD #2 s/p     She is doing well this morning. She is tolerating a regular diet without nausea or vomiting. She is voiding spontaneously. She is ambulating. She has passed flatus, and has not a BM. Vaginal bleeding is mild. She denies fever or chills. Abdominal pain is mild and controlled with oral medications. She Is breastfeeding. She desires circumcision for her male baby: yes.    Objective:     Vital Signs (Most Recent):  Temp: 98.3 °F (36.8 °C) (22)  Pulse: 77 (22)  Resp: 18 (22)  BP: (!) 100/53 (22)  SpO2: 98 % (22)   Vital Signs (24h Range):  Temp:  [98.3 °F (36.8 °C)-98.7 °F (37.1 °C)] 98.3 °F (36.8 °C)  Pulse:  [75-98] 77  Resp:  [17-20] 18  SpO2:  [97 %-98 %] 98 %  BP: (100-134)/(53-69) 100/53     Weight: 83 kg (182 lb 15.7 oz)  Body mass index is 33.47 kg/m².    No intake or output data in the 24 hours ending 22 0922      Significant Labs:  Lab Results   Component Value Date    GROUPTRH B POS 2022    HEPBSAG Negative 2022    STREPBCULT No Group B Streptococcus isolated 2015     Recent Labs   Lab 22  0511   HGB 10.3*   HCT 32.4*       I have personallly reviewed all pertinent lab results from the last 24 hours.    Physical Exam:   Constitutional: She is oriented to person, place, and time. She appears well-developed and well-nourished. No distress.    HENT:   Head: Normocephalic and atraumatic.       Pulmonary/Chest: Effort normal. No respiratory distress.        Abdominal: Soft. She exhibits no distension and no mass. There is no abdominal tenderness. There is no rebound and no guarding.             Musculoskeletal: Normal range of motion and moves all extremeties. No tenderness.       Neurological: She is alert and oriented to person, place, and time. No cranial nerve deficit. Coordination normal.    Skin: She is not diaphoretic.    Psychiatric: She has a normal mood and affect. Her behavior is  normal. Judgment and thought content normal.

## 2022-11-28 ENCOUNTER — PATIENT MESSAGE (OUTPATIENT)
Dept: OBSTETRICS AND GYNECOLOGY | Facility: HOSPITAL | Age: 22
End: 2022-11-28
Payer: MEDICAID

## 2022-11-28 NOTE — DISCHARGE SUMMARY
[facility] - [facility]  Obstetrics  Discharge Summary      Patient Name: Fiona Le  MRN: [id number]  Admission Date: 2022  Hospital Length of Stay: 2 days  Discharge Date and Time: 2022  2:35 PM  Attending Physician: No att. providers found   Discharging Provider: Demetris Ortiz MD   Primary Care Provider: Charles Hein MD    HPI: 23 yo  at 35w2d  Previous  labor with cervix at 2-3 cm since 10/15/2022  Previous  delivery  On Dora weekly  Presented earlier this morning with regular contractions and cervix at 5 cm  Admitted for delivery          * No surgery found *     Hospital Course:   Patient progressed well  Epidural per request  AROM by me at 0632 2022.  Clear with cervix at 8 cm  Complete at 0700    Viable male infant at 0711 22  Weight:  Apgar: 9/9  Placenta: spontaneous and intact with three vessels  EBL: 300 cc  Small lacerations on hymen.  Repaired with 3.0 chromic  No complication  No specimen    Stewart Santamaria MD    22:  pt denies any c/o; ambulating well; pt states her pain is inadequately controlled with motrin; pt is breast feeding    22:  pt to be discharged today; pt very concerned about what meds she will be discharged       Consults (From admission, onward)        Status Ordering Provider     Inpatient consult to Lactation  Once        Provider:  (Not yet assigned)    STEWART Obando          Final Active Diagnoses:    Diagnosis Date Noted POA    PRINCIPAL PROBLEM:  Status post normal delivery in completely normal case [O80] 2016 Not Applicable    History of pregnancy induced hypertension [Z87.59] 2016 Not Applicable      Problems Resolved During this Admission:    Diagnosis Date Noted Date Resolved POA    35 weeks gestation of pregnancy [Z3A.35] 2022 Not Applicable     labor in third trimester without delivery [O60.03] 10/12/2022 2022 Yes    Abdominal pain affecting pregnancy  [O26.899, R10.9] 04/29/2021 11/25/2022 Yes        Significant Diagnostic Studies: Labs: CBC No results for input(s): WBC, HGB, HCT, PLT in the last 48 hours.      Feeding Method: breast    Immunizations     Date Immunization Status Dose Route/Site Given by    11/27/22 1341 MMR Deferred 0.5 mL Subcutaneous/ Leonela Light RN    11/27/22 1344 Tdap Deferred 0.5 mL Intramuscular/ Leonela Light RN          Delivery:    Episiotomy: None   Lacerations: None   Repair suture:     Repair # of packets: 1   Blood loss (ml):       Birth information:  YOB: 2023   Time of birth: 7:11 AM   Sex: male   Delivery type: Vaginal, Spontaneous   Gestational Age: 35w2d    Delivery Clinician:      Other providers:       Additional  information:  Forceps:    Vacuum:    Breech:    Observed anomalies      Living?:           APGARS  One minute Five minutes Ten minutes   Skin color:         Heart rate:         Grimace:         Muscle tone:         Breathing:         Totals: 9  9        Placenta: Delivered:       appearance    Pending Diagnostic Studies:     None          Discharged Condition: good    Disposition: Home or Self Care    Follow Up:   Follow-up Information     Mingo Santamaria MD Follow up in 6 week(s).    Specialties: Obstetrics and Gynecology, Obstetrics and Gynecology  Why: postpartum care  Contact information:  [address]  [address] [place] [id number] [phone]                       Patient Instructions:      BREAST PUMP FOR HOME USE     Order Specific Question Answer Comments   Type of pump: Electric    Weight: 83 kg (182 lb 15.7 oz)    Length of need (1-99 months): 99      Diet Adult Regular     Pelvic Rest     Lifting restrictions     Notify your health care provider if you experience any of the following:  temperature >100.4     Notify your health care provider if you experience any of the following:  persistent nausea and vomiting or diarrhea     Notify your health care provider if you  experience any of the following:  severe uncontrolled pain     Notify your health care provider if you experience any of the following:  redness, tenderness, or signs of infection (pain, swelling, redness, odor or green/yellow discharge around incision site)     Notify your health care provider if you experience any of the following:  difficulty breathing or increased cough     Notify your health care provider if you experience any of the following:  severe persistent headache     Notify your health care provider if you experience any of the following:  persistent dizziness, light-headedness, or visual disturbances     Notify your health care provider if you experience any of the following:  increased confusion or weakness     Weight bearing restrictions (specify):     Medications:  Discharge Medication List as of 11/27/2022  1:45 PM      START taking these medications    Details   ibuprofen (ADVIL,MOTRIN) 800 MG tablet Take 1 tablet (800 mg total) by mouth 3 (three) times daily., Starting Sun 11/27/2022, Normal      oxyCODONE-acetaminophen (PERCOCET) 5-325 mg per tablet Take 1 tablet by mouth every 8 (eight) hours as needed for Pain., Starting Sun 11/27/2022, Normal         CONTINUE these medications which have NOT CHANGED    Details    27 mg iron- 1 mg Tab Take 1 tablet by mouth once daily., Starting Wed 6/15/2022, Historical Med         STOP taking these medications       acetaminophen (TYLENOL) 500 MG tablet Comments:   Reason for Stopping:         aspirin (ECOTRIN) 81 MG EC tablet Comments:   Reason for Stopping:         fluticasone propionate (FLONASE) 50 mcg/actuation nasal spray Comments:   Reason for Stopping:         metroNIDAZOLE (FLAGYL) 500 MG tablet Comments:   Reason for Stopping:         promethazine (PHENERGAN) 25 MG tablet Comments:   Reason for Stopping:               Demetris Ortiz MD  Obstetrics  [facility] - [facility]

## 2022-11-30 ENCOUNTER — TELEPHONE (OUTPATIENT)
Dept: OBSTETRICS AND GYNECOLOGY | Facility: HOSPITAL | Age: 22
End: 2022-11-30

## 2022-11-30 ENCOUNTER — TELEPHONE (OUTPATIENT)
Dept: OBSTETRICS AND GYNECOLOGY | Facility: HOSPITAL | Age: 22
End: 2022-11-30
Payer: MEDICAID

## 2022-12-16 ENCOUNTER — PATIENT MESSAGE (OUTPATIENT)
Dept: OBSTETRICS AND GYNECOLOGY | Facility: CLINIC | Age: 22
End: 2022-12-16
Payer: MEDICAID

## 2022-12-27 ENCOUNTER — CLINICAL SUPPORT (OUTPATIENT)
Dept: OBSTETRICS AND GYNECOLOGY | Facility: CLINIC | Age: 22
End: 2022-12-27
Payer: MEDICAID

## 2022-12-27 ENCOUNTER — POSTPARTUM VISIT (OUTPATIENT)
Dept: OBSTETRICS AND GYNECOLOGY | Facility: CLINIC | Age: 22
End: 2022-12-27
Payer: MEDICAID

## 2022-12-27 VITALS
HEIGHT: 62 IN | DIASTOLIC BLOOD PRESSURE: 74 MMHG | SYSTOLIC BLOOD PRESSURE: 122 MMHG | WEIGHT: 174.19 LBS | BODY MASS INDEX: 32.05 KG/M2

## 2022-12-27 DIAGNOSIS — Z30.42 ENCOUNTER FOR MANAGEMENT AND INJECTION OF DEPO-PROVERA: Primary | ICD-10-CM

## 2022-12-27 DIAGNOSIS — Z30.09 FAMILY PLANNING: ICD-10-CM

## 2022-12-27 PROCEDURE — 99999 PR PBB SHADOW E&M-EST. PATIENT-LVL III: ICD-10-PCS | Mod: PBBFAC,,, | Performed by: OBSTETRICS & GYNECOLOGY

## 2022-12-27 PROCEDURE — 96372 THER/PROPH/DIAG INJ SC/IM: CPT | Mod: PBBFAC

## 2022-12-27 PROCEDURE — 99999 PR PBB SHADOW E&M-EST. PATIENT-LVL I: CPT | Mod: PBBFAC,,,

## 2022-12-27 PROCEDURE — 99999 PR PBB SHADOW E&M-EST. PATIENT-LVL I: ICD-10-PCS | Mod: PBBFAC,,,

## 2022-12-27 PROCEDURE — 99999 PR PBB SHADOW E&M-EST. PATIENT-LVL III: CPT | Mod: PBBFAC,,, | Performed by: OBSTETRICS & GYNECOLOGY

## 2022-12-27 PROCEDURE — 99213 OFFICE O/P EST LOW 20 MIN: CPT | Mod: PBBFAC | Performed by: OBSTETRICS & GYNECOLOGY

## 2022-12-27 PROCEDURE — 59430 PR CARE AFTER DELIVERY ONLY: ICD-10-PCS | Mod: ,,, | Performed by: OBSTETRICS & GYNECOLOGY

## 2022-12-27 RX ORDER — MEDROXYPROGESTERONE ACETATE 150 MG/ML
150 INJECTION, SUSPENSION INTRAMUSCULAR
Status: SHIPPED | OUTPATIENT
Start: 2022-12-27

## 2022-12-27 RX ADMIN — MEDROXYPROGESTERONE ACETATE 150 MG: 150 INJECTION, SUSPENSION INTRAMUSCULAR at 04:12

## 2022-12-27 NOTE — PROGRESS NOTES
Subjective:       Patient ID: Fiona Le is a 22 y.o. female.    Chief Complaint:  Postpartum Follow-up (4 wks pp for  on 2022.  Pt is currently breast feeding and would like to get on depo.)      History of Present Illness  HPI  Ms Le is a 22 years old, status post vaginal delivery on 2022.  She comes in today for an exam and followup.  Patient has no current complaints.  No fever or chills.  No nausea or vomiting.  No diarrhea or constipation.  No abdominal or pelvic pain.    She has not resumed normal intercourse.  Patient has begun some walking for exercise. She denies having signs and symptoms of significant depression.  She is breast-feeding well.      GYN & OB History  No LMP recorded.   Date of Last Pap: 2021    OB History    Para Term  AB Living   3 3 1 2   3   SAB IAB Ectopic Multiple Live Births         0 3      # Outcome Date GA Lbr Nahid/2nd Weight Sex Delivery Anes PTL Lv   3  22 35w2d  3.03 kg (6 lb 10.9 oz) M Vag-Spont EPI Y DOUG   2  21 34w1d 01:18 / 00:04 1.82 kg (4 lb 0.2 oz) F Vag-Spont EPI Y DOUG   1 Term 16 38w5d  3.738 kg (8 lb 3.9 oz) M Vag-Vacuum EPI N DOUG     Past Medical History:   Diagnosis Date    Asthma     Hypertension affecting pregnancy in third trimester 2021       History reviewed. No pertinent surgical history.    Family History   Problem Relation Age of Onset    Miscarriages / Stillbirths Mother     Hypertension Mother     Hypertension Maternal Grandmother     Diabetes Maternal Grandfather     Stroke Maternal Grandfather     Seizures Brother        Social History     Socioeconomic History    Marital status: Single   Tobacco Use    Smoking status: Never    Smokeless tobacco: Never   Substance and Sexual Activity    Alcohol use: Not Currently    Drug use: Yes     Types: Marijuana    Sexual activity: Yes     Partners: Male     Birth control/protection: None   Social History Narrative    Together since  5/24/2014    Both graduated from [employer]    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     No current facility-administered medications for this visit.       Review of patient's allergies indicates:   Allergen Reactions    Fish containing products Rash         Review of Systems  Review of Systems   Constitutional:  Positive for fatigue. Negative for activity change, appetite change, chills, fever and unexpected weight change.   HENT:  Negative for mouth sores.    Respiratory:  Negative for cough, shortness of breath and wheezing.    Cardiovascular:  Negative for chest pain and palpitations.   Gastrointestinal:  Negative for abdominal pain, bloating, blood in stool, constipation, nausea and vomiting.   Endocrine: Negative for diabetes and hot flashes.   Genitourinary:  Negative for dysmenorrhea, dyspareunia, dysuria, frequency, hematuria, menorrhagia, menstrual problem, pelvic pain, urgency, vaginal bleeding, vaginal discharge, vaginal pain, urinary incontinence, postcoital bleeding and vaginal odor.   Musculoskeletal:  Negative for back pain and myalgias.   Integumentary:  Negative for rash, breast mass and nipple discharge.   Neurological:  Negative for seizures and headaches.   Psychiatric/Behavioral:  Negative for depression and sleep disturbance. The patient is not nervous/anxious.    Breast: Negative for mass, mastodynia and nipple discharge        Objective:    Physical Exam:   Constitutional: She appears well-developed and well-nourished. No distress.   BMI of 31.85    HENT:   Head: Normocephalic and atraumatic.    Eyes: EOM are normal.      Pulmonary/Chest: Effort normal. No respiratory distress.   Breasts: Non-tender, no engorgement, no masses, no retraction, no discharge. Negative for lymphadenopathy.         Abdominal: Soft. She exhibits no distension. There is no abdominal tenderness. There is no rebound and no guarding.      Genitourinary:    Vagina and uterus normal.   No  no vaginal discharge in the vagina.    Genitourinary Comments: Vulva without any obvious lesions.  Urethral meatus normal size and location without any lesion.  Urethra is non-tender without stricture or discharge.  Bladder is non-tender.  Vaginal vault with good support.  Minimal white discharge noted.  No obvious lesion.  Normal rugation.  Cervix is without any cervical motion tenderness.  No obvious lesion.  Uterus is small, non-tender, normal contour.  Adnexa is without any masses or tenderness.  Perineum without obvious lesion.               Musculoskeletal: Normal range of motion.       Neurological: She is alert.    Skin: Skin is warm and dry.    Psychiatric: She has a normal mood and affect.        Assessment:        1. Postpartum care and examination immediately after delivery    2. Family planning              Plan:      I have discussed with the patient regarding her condition.  She has recovered well from her delivery  She will slowly resume all normal activities    I have also discussed with the patient regarding her contraceptive options.  Risks and benefits of all discussed including oral contraceptives, Depo-Provera, OrthoEvra, NuvaRing, Mirena/ParaGard, Nexplanon, sterilization. After extensive dicussion, the patient wishes to have Depo Provera.  Risks and benefits again discussed.  All of her questions were answered appropriately to her satisfaction.  The injection will be given today  Back in 3 months for follow-up.

## 2022-12-27 NOTE — PROGRESS NOTES
Depo-provera injection administered without difficutly. Pt instructed to sit in waiting room for 15 minutes to monitor for s/s of adverse reaction. Next appointment made, stressed importance of staying within munir period. Pt verb understanding

## 2023-02-02 ENCOUNTER — PATIENT MESSAGE (OUTPATIENT)
Dept: OBSTETRICS AND GYNECOLOGY | Facility: CLINIC | Age: 23
End: 2023-02-02
Payer: MEDICAID

## 2023-03-16 ENCOUNTER — TELEPHONE (OUTPATIENT)
Dept: OBSTETRICS AND GYNECOLOGY | Facility: CLINIC | Age: 23
End: 2023-03-16
Payer: MEDICAID

## 2023-03-16 NOTE — TELEPHONE ENCOUNTER
----- Message from Anaya Browne sent at 3/16/2023  2:40 PM CDT -----  Regarding: patient call back  Type: Patient Call Back    Who called: Self     What is the request in detail: Asked for a nurse to call her about her apt day/time. She wants it back to the 21st     Can the clinic reply by [facility]? No     Would the patient rather a call back or a response via [facility]? Call     Best call back number:  [phone]      
nl ambulation , tolerating po well being picked up by  sister

## 2023-03-22 ENCOUNTER — TELEPHONE (OUTPATIENT)
Dept: OBSTETRICS AND GYNECOLOGY | Facility: CLINIC | Age: 23
End: 2023-03-22
Payer: MEDICAID

## 2023-03-22 ENCOUNTER — PATIENT MESSAGE (OUTPATIENT)
Dept: OBSTETRICS AND GYNECOLOGY | Facility: CLINIC | Age: 23
End: 2023-03-22
Payer: MEDICAID

## 2023-03-22 NOTE — TELEPHONE ENCOUNTER
No Answer/BusyCommunicated - called pt to r/s her appt please add her to the schedule for next week on thurs or friday ( 3/30 or 3/31) thank you

## 2023-03-30 ENCOUNTER — CLINICAL SUPPORT (OUTPATIENT)
Dept: OBSTETRICS AND GYNECOLOGY | Facility: CLINIC | Age: 23
End: 2023-03-30
Payer: MEDICAID

## 2023-03-30 DIAGNOSIS — Z30.42 ENCOUNTER FOR MANAGEMENT AND INJECTION OF DEPO-PROVERA: Primary | ICD-10-CM

## 2023-03-30 PROCEDURE — 96372 THER/PROPH/DIAG INJ SC/IM: CPT | Mod: PBBFAC

## 2023-03-30 RX ADMIN — MEDROXYPROGESTERONE ACETATE 150 MG: 150 INJECTION, SUSPENSION INTRAMUSCULAR at 10:03

## 2023-03-30 NOTE — PROGRESS NOTES
Depo-provera injection administered without difficutly. Pt instructed to sit in waiting room for 15 minutes to monitor for s/s of adverse reaction. Next appointment made, stressed importance of staying within munir period. Pt verb understanding.

## 2023-04-30 ENCOUNTER — HOSPITAL ENCOUNTER (EMERGENCY)
Facility: HOSPITAL | Age: 23
Discharge: HOME | End: 2023-04-30
Attending: EMERGENCY MEDICINE
Payer: MEDICAID

## 2023-04-30 VITALS
BODY MASS INDEX: 32.2 KG/M2 | HEART RATE: 94 BPM | RESPIRATION RATE: 16 BRPM | DIASTOLIC BLOOD PRESSURE: 94 MMHG | OXYGEN SATURATION: 100 % | HEIGHT: 62 IN | TEMPERATURE: 98 F | SYSTOLIC BLOOD PRESSURE: 131 MMHG | WEIGHT: 175 LBS

## 2023-04-30 DIAGNOSIS — J45.21 MILD INTERMITTENT ASTHMA WITH EXACERBATION: Primary | ICD-10-CM

## 2023-04-30 PROCEDURE — 99283 EMERGENCY DEPT VISIT LOW MDM: CPT

## 2023-04-30 RX ORDER — ALBUTEROL SULFATE 90 UG/1
1-2 AEROSOL, METERED RESPIRATORY (INHALATION) EVERY 6 HOURS PRN
Qty: 18 G | Refills: 0 | Status: SHIPPED | OUTPATIENT
Start: 2023-04-30

## 2023-04-30 NOTE — Clinical Note
"Fiona Valdestammi" Rey was seen and treated in our emergency department on 4/30/2023.  She may return to work on 05/01/2023.       If you have any questions or concerns, please don't hesitate to call.      REHANA Phelan LPN"

## 2023-04-30 NOTE — Clinical Note
"Fiona Valdeskarma Le was seen and treated in our emergency department on 4/30/2023.  She may return to work on 05/01/2023.       If you have any questions or concerns, please don't hesitate to call.      Baldo Nguyen DNP"

## 2023-04-30 NOTE — ED PROVIDER NOTES
"Encounter Date: 4/30/2023    SCRIBE #1 NOTE: I, LAVONNE ACHARYA, am scribing for, and in the presence of,  Baldo Nguyen DNP. I have scribed the following portions of the note - Other sections scribed: HPI, ROS, PE, MDM.     History     Chief Complaint   Patient presents with    Shortness of Breath     Sob after being around someone smoking. Neb en route. Hx of asthma     22-year-old female with a PMHx of asthma, presents to the ED via EMS with a chief complaint of SOB onset one hour ago. Additional history obtained from an independent historian, EMS personnel, who states that patient had an asthma attack while she was at home. She used two pumps of her uncle's "rescue" inhaler prior to EMS arrival and was administered Nebulizer treatment en route with patient's symptoms resolving. Patient states that she had SOB and wheezing but "feels better now". She further states that she is currently out of her inhaler medication. Endorses using Albuterol inhaler as needed and states that she mostly has asthma attacks with weather changes. Denies a Hx of intubation for asthma. No other exacerbating or alleviating factors. Denies any other associated symptoms.     The history is provided by the patient and the EMS personnel. No  was used.   Review of patient's allergies indicates:   Allergen Reactions    Fish containing products Rash     Past Medical History:   Diagnosis Date    Asthma     Hypertension affecting pregnancy in third trimester 6/21/2021     No past surgical history on file.  Family History   Problem Relation Age of Onset    Miscarriages / Stillbirths Mother     Hypertension Mother     Hypertension Maternal Grandmother     Diabetes Maternal Grandfather     Stroke Maternal Grandfather     Seizures Brother      Social History     Tobacco Use    Smoking status: Never    Smokeless tobacco: Never   Substance Use Topics    Alcohol use: Not Currently    Drug use: Yes     Types: Marijuana     Review " of Systems   Constitutional:  Negative for chills, fatigue and fever.   HENT:  Negative for congestion, ear discharge, ear pain, postnasal drip, rhinorrhea, sinus pressure, sneezing, sore throat and voice change.    Eyes:  Negative for discharge and itching.   Respiratory:  Negative for cough, shortness of breath (resolved) and wheezing (resolved).    Cardiovascular:  Negative for chest pain, palpitations and leg swelling.   Gastrointestinal:  Negative for abdominal pain, constipation, diarrhea, nausea and vomiting.   Endocrine: Negative for polydipsia, polyphagia and polyuria.   Genitourinary:  Negative for dysuria, frequency, hematuria, urgency, vaginal bleeding, vaginal discharge and vaginal pain.   Musculoskeletal:  Negative for arthralgias and myalgias.   Skin:  Negative for rash and wound.   Neurological:  Negative for dizziness, seizures, syncope, weakness and numbness.   Hematological:  Negative for adenopathy. Does not bruise/bleed easily.   Psychiatric/Behavioral:  Negative for self-injury and suicidal ideas. The patient is not nervous/anxious.      Physical Exam     Initial Vitals [04/30/23 1435]   BP Pulse Resp Temp SpO2   (!) 131/94 94 16 98.2 °F (36.8 °C) 100 %      MAP       --         Physical Exam    Nursing note and vitals reviewed.  Constitutional: She appears well-developed and well-nourished.   Patient laying in mid-Etienne's position. Patient is awake, alert, and oriented.    HENT:   Head: Normocephalic and atraumatic.   Right Ear: External ear normal.   Left Ear: External ear normal.   Nose: Nose normal.   Eyes: Conjunctivae and EOM are normal. Pupils are equal, round, and reactive to light. Right eye exhibits no discharge. Left eye exhibits no discharge.   Neck:   Normal range of motion.  Pulmonary/Chest:   Breath sounds clear to ascultation.    Abdominal: She exhibits no distension.   Musculoskeletal:         General: Normal range of motion.      Cervical back: Normal range of motion.      Neurological: She is oriented to person, place, and time.   Skin: Skin is dry. Capillary refill takes less than 2 seconds. No cyanosis.       ED Course   Procedures  Labs Reviewed - No data to display       Imaging Results    None          Medications - No data to display  Medical Decision Making:   History:   Old Medical Records: I decided to obtain old medical records.  Initial Assessment:   22 y.o. female presents to the ER for SOB and wheezing. Patient has a PMHx of asthma. On exam, patient had no abnormal findings.   Differential Diagnosis:   Differential diagnosis include but are not limited to: Asthma exacerbation.         Scribe Attestation:   Scribe #1: I performed the above scribed service and the documentation accurately describes the services I performed. I attest to the accuracy of the note.      ED Course as of 04/30/23 1514   Sun Apr 30, 2023   1446 BP(!): 131/94 [VC]   1446 Temp: 98.2 °F (36.8 °C) [VC]   1446 Temp Source: Oral [VC]   1446 Pulse: 94 [VC]   1446 Resp: 16 [VC]   1446 SpO2: 100 % [VC]      ED Course User Index  [VC] Baldo Nguyen DNP        Medical Decision Making  On physical examination breath sounds are clear to patient patient mid to low Etienne's position without tripod position able to speak in complete sentences.  No tachypnea is noted.  No nasal flaring is noted.  No cyanosis is noted.  She is awake alert oriented.    Problems Addressed:  Mild intermittent asthma with exacerbation: acute illness or injury     Details: On initial examination the patient's breath sounds are cleared significantly from reported wheezing.  Her pulse ox was 100% respiratory rate was 16 I feel the patient can safely be discharged home in good condition to follow-up as directed.    Risk  Prescription drug management.  Diagnosis or treatment significantly limited by social determinants of health.             Scribe attestation: Scribe attestation: Baldo VICENTE DNP ACNP-BC FNP-C ENP-C,   "personally performed the services described in this documentation. All medical record entries made by the scribe were at my direction and in my presence.  I have reviewed the chart and agree that the record reflects my personal performance and is accurate and complete.     Clinical Impression:   Final diagnoses:  [J45.21] Mild intermittent asthma with exacerbation (Primary)     Vital signs at the time of disposition were:  BP (!) 131/94   Pulse 94   Temp 98.2 °F (36.8 °C) (Oral)   Resp 16   Ht 5' 2" (1.575 m)   Wt 79.4 kg (175 lb)   SpO2 100%   Breastfeeding No   BMI 32.01 kg/m²       See AVS for additional recommendations. Medications listed herein were prescribed after reviewing the patient's allergies, medication list, history, most recent laboratories as available.  Referrals below were provided after reviewing the patient's previous medical providers. She understands she  should return for any worsening or changes in condition.  Prior to discharge the patient was asked if she  had any additional concerns or complaints and she declined. The patient was given an opportunity to ask questions and all were answered to her satisfaction.    ED Disposition Condition    Discharge Stable          ED Prescriptions       Medication Sig Dispense Start Date End Date Auth. Provider    albuterol (PROVENTIL/VENTOLIN HFA) 90 mcg/actuation inhaler Inhale 1-2 puffs into the lungs every 6 (six) hours as needed for Wheezing. Rescue 18 g 4/30/2023 -- Baldo Nguyen DNP          Follow-up Information       Follow up With Specialties Details Why Contact Info    Charles Hein MD Pediatrics Schedule an appointment as soon as possible for a visit   [address] [place] [address]  [id number]  [place] [address]  [phone]               Baldo Nguyen DNP  04/30/23 1514    "

## 2023-06-22 ENCOUNTER — CLINICAL SUPPORT (OUTPATIENT)
Dept: OBSTETRICS AND GYNECOLOGY | Facility: CLINIC | Age: 23
End: 2023-06-22
Payer: MEDICAID

## 2023-06-22 DIAGNOSIS — Z30.42 ENCOUNTER FOR MANAGEMENT AND INJECTION OF DEPO-PROVERA: Primary | ICD-10-CM

## 2023-06-22 PROCEDURE — 96372 THER/PROPH/DIAG INJ SC/IM: CPT | Mod: PBBFAC

## 2023-06-22 RX ADMIN — MEDROXYPROGESTERONE ACETATE 150 MG: 150 INJECTION, SUSPENSION INTRAMUSCULAR at 10:06

## 2023-07-28 ENCOUNTER — OCCUPATIONAL HEALTH (OUTPATIENT)
Dept: URGENT CARE | Facility: CLINIC | Age: 23
End: 2023-07-28

## 2023-07-28 DIAGNOSIS — Z13.9 ENCOUNTER FOR SCREENING: Primary | ICD-10-CM

## 2023-07-28 PROCEDURE — 86580 POCT TB SKIN TEST: ICD-10-PCS | Mod: S$GLB,,, | Performed by: PHYSICIAN ASSISTANT

## 2023-07-28 PROCEDURE — 86580 TB INTRADERMAL TEST: CPT | Mod: S$GLB,,, | Performed by: PHYSICIAN ASSISTANT

## 2023-10-11 ENCOUNTER — HOSPITAL ENCOUNTER (EMERGENCY)
Facility: HOSPITAL | Age: 23
Discharge: LEFT BEFORE DISCHARGE | End: 2023-10-11
Attending: STUDENT IN AN ORGANIZED HEALTH CARE EDUCATION/TRAINING PROGRAM
Payer: MEDICAID

## 2023-10-11 VITALS
DIASTOLIC BLOOD PRESSURE: 76 MMHG | SYSTOLIC BLOOD PRESSURE: 129 MMHG | OXYGEN SATURATION: 100 % | BODY MASS INDEX: 30.05 KG/M2 | TEMPERATURE: 98 F | HEART RATE: 96 BPM | WEIGHT: 187 LBS | RESPIRATION RATE: 14 BRPM | HEIGHT: 66 IN

## 2023-10-11 DIAGNOSIS — J45.909 ASTHMA: ICD-10-CM

## 2023-10-11 DIAGNOSIS — R06.02 SOB (SHORTNESS OF BREATH): ICD-10-CM

## 2023-10-11 LAB
INFLUENZA A, MOLECULAR: NEGATIVE
INFLUENZA B, MOLECULAR: NEGATIVE
SARS-COV-2 RDRP RESP QL NAA+PROBE: NEGATIVE
SPECIMEN SOURCE: NORMAL

## 2023-10-11 PROCEDURE — U0002 COVID-19 LAB TEST NON-CDC: HCPCS | Performed by: STUDENT IN AN ORGANIZED HEALTH CARE EDUCATION/TRAINING PROGRAM

## 2023-10-11 PROCEDURE — 93010 EKG 12-LEAD: ICD-10-PCS | Mod: ,,, | Performed by: INTERNAL MEDICINE

## 2023-10-11 PROCEDURE — 94761 N-INVAS EAR/PLS OXIMETRY MLT: CPT

## 2023-10-11 PROCEDURE — 99283 EMERGENCY DEPT VISIT LOW MDM: CPT | Mod: 25

## 2023-10-11 PROCEDURE — 93010 ELECTROCARDIOGRAM REPORT: CPT | Mod: ,,, | Performed by: INTERNAL MEDICINE

## 2023-10-11 PROCEDURE — 25000242 PHARM REV CODE 250 ALT 637 W/ HCPCS: Performed by: STUDENT IN AN ORGANIZED HEALTH CARE EDUCATION/TRAINING PROGRAM

## 2023-10-11 PROCEDURE — 25000003 PHARM REV CODE 250: Performed by: STUDENT IN AN ORGANIZED HEALTH CARE EDUCATION/TRAINING PROGRAM

## 2023-10-11 PROCEDURE — 94640 AIRWAY INHALATION TREATMENT: CPT

## 2023-10-11 PROCEDURE — 63600175 PHARM REV CODE 636 W HCPCS: Performed by: STUDENT IN AN ORGANIZED HEALTH CARE EDUCATION/TRAINING PROGRAM

## 2023-10-11 PROCEDURE — 87502 INFLUENZA DNA AMP PROBE: CPT | Performed by: STUDENT IN AN ORGANIZED HEALTH CARE EDUCATION/TRAINING PROGRAM

## 2023-10-11 PROCEDURE — 93005 ELECTROCARDIOGRAM TRACING: CPT

## 2023-10-11 RX ORDER — PREDNISONE 20 MG/1
20 TABLET ORAL
Status: COMPLETED | OUTPATIENT
Start: 2023-10-11 | End: 2023-10-11

## 2023-10-11 RX ORDER — ALBUTEROL SULFATE 2.5 MG/.5ML
5 SOLUTION RESPIRATORY (INHALATION)
Status: COMPLETED | OUTPATIENT
Start: 2023-10-11 | End: 2023-10-11

## 2023-10-11 RX ORDER — ACETAMINOPHEN 325 MG/1
650 TABLET ORAL
Status: COMPLETED | OUTPATIENT
Start: 2023-10-11 | End: 2023-10-11

## 2023-10-11 RX ADMIN — ACETAMINOPHEN 650 MG: 325 TABLET ORAL at 11:10

## 2023-10-11 RX ADMIN — PREDNISONE 20 MG: 20 TABLET ORAL at 11:10

## 2023-10-11 RX ADMIN — ALBUTEROL SULFATE 5 MG: 2.5 SOLUTION RESPIRATORY (INHALATION) at 11:10

## 2023-10-11 NOTE — CODE/ RAPID DOCUMENTATION
RAPID RESPONSE NURSE NOTE        Admit Date: 10/11/2023  LOS: 0  Code Status: Prior   Date of Consult: 10/11/2023  : 2000  Age: 23 y.o.  Weight:   Wt Readings from Last 1 Encounters:   10/11/23 84.8 kg (187 lb)     Sex: female  Race: Black or    Bed: INT 03/INT 03:   MRN: [id number]  Time Rapid Response Team page Received: 1048  Time Rapid Response Team at Bedside: 1045  Time Rapid Response Team left Bedside: upon arrival  Was the patient discharged from an ICU this admission? No  Was the patient discharged from a PACU within last 24 hours? No   Did the patient receive conscious sedation/general anesthesia in last 24 hours? No  Was the patient in the ED within the past 24 hours? No  Was the patient on NIPPV within the past 24 hours? No   Did this progress into an ARC or CPA: No  Attending Physician: NA  Primary Service: NA    SITUATION    Notified by overhead page.  Reason for alert: Asthma attack  Called to evaluate the patient for Respiratory    BACKGROUND     Why is the patient in the hospital?: Working    Patient has a past medical history of Asthma and Hypertension affecting pregnancy in third trimester.    ASSESSMENT/INTERVENTIONS    What did you find: Patient in a wheel chair with house supervisor and Rapid Response Rt en route to the ER. Patient checked into ER triage    RECOMMENDATIONS    We recommend: ED transfer and evaluation.        FOLLOW UP    Call the Rapid Response Nurse, Nikhil Daly RN at [phone] for additional questions or concerns.

## 2023-10-11 NOTE — ED TRIAGE NOTES
Brought to er by RR Team and House Sup[; works in cafeteria. Used somebodys inhaler. Sats 97% per RR team

## 2023-10-11 NOTE — ED PROVIDER NOTES
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 Used somebodys inhaler. Sats 97% per RR team)      History Of Present Illness   Fiona Le is a 23 y.o. female with a PMHx including asthma  presenting with shortness of breath.  Patient states that she was at work today and suddenly felt short of breath with wheezing.  States it felt like her previous asthma exacerbations.  States that she had been having little bit of shortness of breath and chest tightness for the past 2 days but today was suddenly worse.  States that she did not have a rescue inhaler with her but used someone else's.  Time she got to the emergency department, she states that her shortness of breath and wheezing has improved but she still had chest tightness.  Otherwise reports no fevers, chills.  Reports no chest pain, abdominal pain, vomiting, diarrhea.  Reports no known sick contacts.  Reports no cough, congestion, rhinorrhea, or sore throat.    Independent Historian: Yes  Other Historian or Collateral: No  Interpretor: No      Review of patient's allergies indicates:   Allergen Reactions    Fish containing products Rash       Current Facility-Administered Medications on File Prior to Encounter   Medication Dose Route Frequency Provider Last Rate Last Admin    medroxyPROGESTERone (DEPO-PROVERA) injection 150 mg  150 mg Intramuscular Q90 Days Mingo Santamaria MD   150 mg at 06/22/23 1033     Current Outpatient Medications on File Prior to Encounter   Medication Sig Dispense Refill    albuterol (PROVENTIL/VENTOLIN HFA) 90 mcg/actuation inhaler Inhale 1-2 puffs into the lungs every 6 (six) hours as needed for Wheezing. Rescue 18 g 0       Past History   As per HPI and below:  Past Medical History:   Diagnosis Date    Asthma     Hypertension affecting pregnancy in third trimester 6/21/2021     History reviewed. No pertinent surgical history.    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   Constitutional:       General: She is not in acute distress.     Appearance: Normal appearance. She is not toxic-appearing.   HENT:      Head: Normocephalic and atraumatic.      Right Ear: External ear normal.      Left Ear: External ear normal.      Nose: No congestion.      Mouth/Throat:      Mouth: Mucous membranes are moist.   Eyes:      General: No scleral icterus.     Extraocular Movements: Extraocular movements intact.      Pupils: Pupils are equal, round, and reactive to light.   Cardiovascular:      Rate and Rhythm: Normal rate and regular rhythm.   Pulmonary:      Effort: No respiratory distress.      Breath sounds: No rhonchi.      Comments: Poor air movement on exam with some very faint end-expiratory wheezing.  Abdominal:      General: There is no distension.      Palpations: Abdomen is soft.      Tenderness: There is no abdominal tenderness. There is no guarding.   Musculoskeletal:         General: No swelling or deformity.      Cervical back: Normal range of motion. No rigidity.   Skin:     " General: Skin is warm and dry.      Capillary Refill: Capillary refill takes less than 2 seconds.   Neurological:      General: No focal deficit present.      Mental Status: She is alert and oriented to person, place, and time.             Results     Labs Reviewed   INFLUENZA A & B BY MOLECULAR   HIV 1 / 2 ANTIBODY   HEPATITIS C ANTIBODY   SARS-COV-2 RNA AMPLIFICATION, QUAL       Imaging Results    None               Initial [facility]   Medical Decision Making  The patient is a 23-year-old male presenting with shortness of breath and wheezing consistent with her prior asthma exacerbations.  Unknown trigger.  We will get COVID and flu testing.  We will get chest x-ray to further evaluate for spontaneous pneumothorax though very low suspicion.  We will give albuterol nebulizer treatment here in the ED and re-evaluate.    Amount and/or Complexity of Data Reviewed  Radiology: ordered.    Risk  OTC drugs.  Prescription drug management.               Medications Given / Interventions     Medications   albuterol sulfate nebulizer solution 5 mg (has no administration in time range)   predniSONE tablet 20 mg (has no administration in time range)   acetaminophen tablet 650 mg (has no administration in time range)       Procedures     ED POCUS Performed: No    Reassessment and ED Course     ED Course as of 10/11/23 1217   Wed Oct 11, 2023   1215 Notified by nurse that patient eloped from the department prior to receiving her chest x-ray.  State the patient states that her ride was here she did not want to wait any longer.  COVID and flu were negative.  Patient was well-appearing and satting 100% on room air. [CH]      ED Course User Index  [CH] Flori Mendoza MD              Final diagnoses:  [J45.909] Asthma  [R06.02] SOB (shortness of breath)           Dispo                        Flori Mendoza MD  10/11/23 1210

## 2024-04-07 ENCOUNTER — HOSPITAL ENCOUNTER (EMERGENCY)
Facility: HOSPITAL | Age: 24
Discharge: HOME | End: 2024-04-07
Attending: EMERGENCY MEDICINE
Payer: MEDICAID

## 2024-04-07 VITALS
TEMPERATURE: 99 F | HEIGHT: 66 IN | HEART RATE: 99 BPM | BODY MASS INDEX: 31.18 KG/M2 | OXYGEN SATURATION: 100 % | WEIGHT: 194 LBS | DIASTOLIC BLOOD PRESSURE: 65 MMHG | RESPIRATION RATE: 20 BRPM | SYSTOLIC BLOOD PRESSURE: 119 MMHG

## 2024-04-07 DIAGNOSIS — O99.891 BACK PAIN IN PREGNANCY: ICD-10-CM

## 2024-04-07 DIAGNOSIS — R51.9 PREGNANCY HEADACHE IN FIRST TRIMESTER: Primary | ICD-10-CM

## 2024-04-07 DIAGNOSIS — O26.891 PREGNANCY HEADACHE IN FIRST TRIMESTER: Primary | ICD-10-CM

## 2024-04-07 DIAGNOSIS — M54.9 BACK PAIN IN PREGNANCY: ICD-10-CM

## 2024-04-07 LAB
B-HCG UR QL: POSITIVE
BILIRUB UR QL STRIP: NEGATIVE
CLARITY UR: CLEAR
COLOR UR: YELLOW
CTP QC/QA: YES
GLUCOSE UR QL STRIP: NEGATIVE
HGB UR QL STRIP: NEGATIVE
KETONES UR QL STRIP: NEGATIVE
LEUKOCYTE ESTERASE UR QL STRIP: NEGATIVE
NITRITE UR QL STRIP: NEGATIVE
PH UR STRIP: 7 [PH] (ref 5–8)
PROT UR QL STRIP: NEGATIVE
SP GR UR STRIP: 1.02 (ref 1–1.03)
URN SPEC COLLECT METH UR: NORMAL
UROBILINOGEN UR STRIP-ACNC: NEGATIVE EU/DL

## 2024-04-07 PROCEDURE — 81025 URINE PREGNANCY TEST: CPT | Performed by: EMERGENCY MEDICINE

## 2024-04-07 PROCEDURE — 25000003 PHARM REV CODE 250

## 2024-04-07 PROCEDURE — 99283 EMERGENCY DEPT VISIT LOW MDM: CPT

## 2024-04-07 PROCEDURE — 81003 URINALYSIS AUTO W/O SCOPE: CPT

## 2024-04-07 RX ORDER — METOCLOPRAMIDE 10 MG/1
10 TABLET ORAL
Status: COMPLETED | OUTPATIENT
Start: 2024-04-07 | End: 2024-04-07

## 2024-04-07 RX ORDER — ONDANSETRON 4 MG/1
4 TABLET, ORALLY DISINTEGRATING ORAL
Status: COMPLETED | OUTPATIENT
Start: 2024-04-07 | End: 2024-04-07

## 2024-04-07 RX ADMIN — ONDANSETRON 4 MG: 4 TABLET, ORALLY DISINTEGRATING ORAL at 10:04

## 2024-04-07 RX ADMIN — METOCLOPRAMIDE 10 MG: 10 TABLET ORAL at 10:04

## 2024-04-07 NOTE — Clinical Note
"Fiona Valdestammi" Rey was seen and treated in our emergency department on 4/7/2024.  She may return to work on 04/08/2024.       If you have any questions or concerns, please don't hesitate to call.      Susana Valdovinos PA-C"

## 2024-04-08 NOTE — DISCHARGE INSTRUCTIONS
Be sure to rest and drink plenty of fluids and stay hydrated.   You may take Tylenol as prescribed on packaging.  Follow up with your OB/GYN if symptoms persist or worsen.

## 2024-04-08 NOTE — ED PROVIDER NOTES
Encounter Date: 4/7/2024       History     Chief Complaint   Patient presents with    Back Pain    Headache     Pt presents to ER stating she is 8 weeks pregnant but is having a bad HA and back pain. Pt states she took tylenol but it isn't helping. Pt denies any other issues at this time.      22 y/o gravid female 8 weeks gestation presents for emergent evaluation of acute onset HA 2 hours prior to arrival.  Patient states that she was eating crawfish when her headache began stating she is unsure if spiciness triggered headache.  She states she then went to lay down in bed and noticed some lower back pain described as tightness.  States it is worse with certain movements.  She denies dysuria, suprapubic tenderness, fevers, vaginal discharge, vaginal bleeding. She took 1 g of Tylenol was unable to get comfortable in bed.  She was concerned that her blood pressure may be too high so she checked it home noting it was 140ish/90ish prompting her to report to the ED. She states that both the headache and low back pain is very similar to her prior pregnancy but states usually occurs when she was about 5 months pregnant.  She denies any nausea, vomiting, diarrhea, chest pain, shortness of breath, photophobia, vision changes, head injury, difficulty ambulating or any other complaints at this time.  She has not been seen by her OB at this time but is scheduled with Dr. Santamaria 4/16/23.     The history is provided by the patient.     Review of patient's allergies indicates:   Allergen Reactions    Fish containing products Rash     Past Medical History:   Diagnosis Date    Asthma     Hypertension affecting pregnancy in third trimester 6/21/2021     History reviewed. No pertinent surgical history.  Family History   Problem Relation Age of Onset    Miscarriages / Stillbirths Mother     Hypertension Mother     Hypertension Maternal Grandmother     Diabetes Maternal Grandfather     Stroke Maternal Grandfather     Seizures Brother       Social History     Tobacco Use    Smoking status: Never    Smokeless tobacco: Never   Substance Use Topics    Alcohol use: Not Currently    Drug use: Yes     Types: Marijuana     Review of Systems   Constitutional:  Negative for chills and fever.   HENT:  Negative for congestion, ear pain, rhinorrhea, sore throat and trouble swallowing.    Eyes:  Negative for photophobia and visual disturbance.   Respiratory:  Negative for cough and shortness of breath.    Cardiovascular:  Negative for chest pain.   Gastrointestinal:  Negative for abdominal pain, constipation, diarrhea, nausea and vomiting.   Genitourinary:  Negative for decreased urine volume, dysuria, frequency, hematuria, urgency, vaginal bleeding, vaginal discharge and vaginal pain.   Musculoskeletal:  Positive for back pain. Negative for neck pain.   Skin:  Negative for rash and wound.   Neurological:  Positive for headaches. Negative for dizziness, weakness and light-headedness.       Physical Exam     Initial Vitals [04/07/24 2056]   BP Pulse Resp Temp SpO2   119/65 99 20 98.9 °F (37.2 °C) 100 %      MAP       --         Physical Exam    Nursing note and vitals reviewed.  Constitutional: She appears well-developed and well-nourished. She is not diaphoretic. No distress.   HENT:   Head: Normocephalic and atraumatic.   Right Ear: External ear normal.   Left Ear: External ear normal.   Mouth/Throat: Oropharynx is clear and moist.   Eyes: Conjunctivae and EOM are normal. Pupils are equal, round, and reactive to light. Right eye exhibits no discharge. Left eye exhibits no discharge. No scleral icterus.   Neck: Neck supple. No tracheal deviation present.   Normal range of motion.  Cardiovascular:  Normal rate, regular rhythm and normal heart sounds.           Pulmonary/Chest: Breath sounds normal. No respiratory distress. She has no wheezes.   Abdominal: Abdomen is soft. She exhibits no distension. There is no abdominal tenderness. There is no rebound.    Musculoskeletal:         General: Normal range of motion.      Cervical back: Normal range of motion and neck supple.      Comments: Ambulating without difficulty     Lymphadenopathy:     She has no cervical adenopathy.   Neurological: She is alert and oriented to person, place, and time. She has normal strength. No cranial nerve deficit. GCS score is 15. GCS eye subscore is 4. GCS verbal subscore is 5. GCS motor subscore is 6.   Skin: Skin is warm and dry. Capillary refill takes less than 2 seconds. No rash noted.   Psychiatric: She has a normal mood and affect. Thought content normal.         ED Course   Procedures  Labs Reviewed   POCT URINE PREGNANCY - Abnormal; Notable for the following components:       Result Value    POC Preg Test, Ur Positive (*)     All other components within normal limits   URINALYSIS, REFLEX TO URINE CULTURE    Narrative:     Specimen Source->Urine          Imaging Results    None          Medications   metoclopramide HCl tablet 10 mg (10 mg Oral Given 4/7/24 2216)   ondansetron disintegrating tablet 4 mg (4 mg Oral Given 4/7/24 2216)     Medical Decision Making  This is an evaluation of a 23 y.o. 8 week gravid female that presents to the Emergency Department for acute onset HA and back pain X 2 hours. The patient is a non-toxic, afebrile, and well appearing female. On physical exam, there is no abdominal tenderness.  No tenderness to midline cervical, thoracic or lumbar region.  No paraspinal lumbar tenderness. She has no focal weakness or neurological deficits. Has full ROM of neck with no nuchal rigidity or meningeal signs. There is no staggering or ataxic gait, vomiting, double vision, visual loss, slurred speech, numbness of the face or body, weakness, clumsiness, or incoordination.  Differential includes migraine, tension headache, sinus headache, SAH, other intracranial bleed, intracranial mass, meningitis/encephalitis, carotid or vertebral artery dissection, pseudotumor  cerebri (idiopathic intracranial hypertension), normal pressure hydrocephalus, cerebral venous sinus thrombosis, other.  Differential includes muscle spasm, muscle strain/tear, ligamentous injury, herniated disk, sciatica, radiculopathy, renal colic, pyelonephritis, also more serious etiology like cauda equina, cord syndrome, epidural bleed, epidural abscess, osteomyelitis of spine, discitis, metastatic disease, vertebral fracture, ectopic miscarriage, other.      Vital Signs are Reassuring.  RESULTS:  UA with no red blood cells or signs of infection.  Given Reglan p.o. reports complete relief of headache and back pain.  Tolerating PO without difficulty.  States she is feeling her normal self and requesting discharge.    Advised follow up with OB.  Schedule appointment for 4/16.  The diagnosis, treatment plan, instructions for follow-up and reevaluation as well as ED return precautions have been discussed with the patient and the patient has verbalized an understanding of the information. All questions or concerns have been addressed.  Ambulates out the ER without difficulty.    Amount and/or Complexity of Data Reviewed  External Data Reviewed: labs and notes.  Labs: ordered.    Risk  Diagnosis or treatment significantly limited by social determinants of health.               ED Course as of 04/08/24 0008   Sun Apr 07, 2024   2233 Patient is sleeping during re-evaluation.  She notes complete relief of headache and back pain.  States she is feeling her normal self and requesting discharge.  Tolerating PO without difficulty. [CC]      ED Course User Index  [CC] Susana Valdovinos PA-C                           Clinical Impression:  Final diagnoses:  [O26.891, R51.9] Pregnancy headache in first trimester (Primary)  [O99.891, M54.9] Back pain in pregnancy          ED Disposition Condition    Discharge Stable          ED Prescriptions    None       Follow-up Information       Follow up With Specialties Details Why Contact  Info    [facility] - Emergency Dept Emergency Medicine Go to  For new or worsening symptoms [address] [facility] [place] [id number] [phone]    Charles Hein MD Pediatrics   [address] [place] [address]  [address]  [place] [address]  [phone]               Susana Valdovinos PA-C  04/08/24 0008

## 2024-04-16 ENCOUNTER — PATIENT MESSAGE (OUTPATIENT)
Dept: MATERNAL FETAL MEDICINE | Facility: CLINIC | Age: 24
End: 2024-04-16
Payer: MEDICAID

## 2024-04-16 ENCOUNTER — OFFICE VISIT (OUTPATIENT)
Dept: OBSTETRICS AND GYNECOLOGY | Facility: CLINIC | Age: 24
End: 2024-04-16
Payer: MEDICAID

## 2024-04-16 VITALS
DIASTOLIC BLOOD PRESSURE: 72 MMHG | HEIGHT: 66 IN | WEIGHT: 187.38 LBS | SYSTOLIC BLOOD PRESSURE: 120 MMHG | BODY MASS INDEX: 30.11 KG/M2

## 2024-04-16 DIAGNOSIS — Z32.00 VISIT FOR CONFIRMATION OF PREGNANCY TEST RESULT WITH PHYSICAL EXAM: Primary | ICD-10-CM

## 2024-04-16 PROCEDURE — 99213 OFFICE O/P EST LOW 20 MIN: CPT | Mod: PBBFAC | Performed by: OBSTETRICS & GYNECOLOGY

## 2024-04-16 PROCEDURE — 99459 PELVIC EXAMINATION: CPT | Mod: S$PBB,,, | Performed by: OBSTETRICS & GYNECOLOGY

## 2024-04-16 PROCEDURE — 87491 CHLMYD TRACH DNA AMP PROBE: CPT | Performed by: OBSTETRICS & GYNECOLOGY

## 2024-04-16 PROCEDURE — 3008F BODY MASS INDEX DOCD: CPT | Mod: CPTII,,, | Performed by: OBSTETRICS & GYNECOLOGY

## 2024-04-16 PROCEDURE — 99213 OFFICE O/P EST LOW 20 MIN: CPT | Mod: S$PBB,TH,, | Performed by: OBSTETRICS & GYNECOLOGY

## 2024-04-16 PROCEDURE — 99999 PR PBB SHADOW E&M-EST. PATIENT-LVL III: CPT | Mod: PBBFAC,,, | Performed by: OBSTETRICS & GYNECOLOGY

## 2024-04-16 PROCEDURE — 1160F RVW MEDS BY RX/DR IN RCRD: CPT | Mod: CPTII,,, | Performed by: OBSTETRICS & GYNECOLOGY

## 2024-04-16 PROCEDURE — 99459 PELVIC EXAMINATION: CPT | Mod: PBBFAC | Performed by: OBSTETRICS & GYNECOLOGY

## 2024-04-16 PROCEDURE — 1159F MED LIST DOCD IN RCRD: CPT | Mod: CPTII,,, | Performed by: OBSTETRICS & GYNECOLOGY

## 2024-04-16 PROCEDURE — 3074F SYST BP LT 130 MM HG: CPT | Mod: CPTII,,, | Performed by: OBSTETRICS & GYNECOLOGY

## 2024-04-16 PROCEDURE — 87086 URINE CULTURE/COLONY COUNT: CPT | Performed by: OBSTETRICS & GYNECOLOGY

## 2024-04-16 PROCEDURE — 3078F DIAST BP <80 MM HG: CPT | Mod: CPTII,,, | Performed by: OBSTETRICS & GYNECOLOGY

## 2024-04-16 NOTE — LETTER
April 16, 2024    Fiona Le  [address] [facility] [place] [address]             [facility] - [facility] GYN  [address]   [facility] [address]  Phone: [phone] Dear Dr. Jean,      Ms. Fiona Le is currently under my care for her pregnancy.  It is ok for her to get her eyes dilated during pregnancy.    If you have any questions or concerns, please don't hesitate to contact our office.     Sincerely,          Mingo Santamaria MD

## 2024-04-16 NOTE — PROGRESS NOTES
Subjective     Patient ID: Fiona Le is a 23 y.o. female.    Chief Complaint:  Confirmation (UPT- Positive LMP: 2024 CARRINGTON: 2024 EGA: 9w 1d.)      History of Present Illness  HPI  Patient has a positive home urine pregnancy test on 3/13/2024. She believes she could be pregnant. Pregnancy is desired. Sexual Activity: single partner, contraception: none. Current symptoms also include: breast tenderness, fatigue, frequent urination, nausea and positive home pregnancy test. Last period was normal.     Patient's last menstrual period was 2024 (exact date).     By date, she should be about 9w1 with EDC on 2024      GYN & OB History  Patient's last menstrual period was 2024 (exact date).   Date of Last Pap: 2021    OB History    Para Term  AB Living   4 3 1 2   3   SAB IAB Ectopic Multiple Live Births         0 3      # Outcome Date GA Lbr Nahid/2nd Weight Sex Type Anes PTL Lv   4 Current            3  22 35w2d  3.03 kg (6 lb 10.9 oz) M Vag-Spont EPI Y DOUG   2  21 34w1d 01:18 / 00:04 1.82 kg (4 lb 0.2 oz) F Vag-Spont EPI Y DOUG   1 Term 16 38w5d  3.738 kg (8 lb 3.9 oz) M Vag-Vacuum EPI N DOUG     Past Medical History:   Diagnosis Date    Asthma     Hypertension affecting pregnancy in third trimester 2021       No past surgical history on file.    Family History   Problem Relation Name Age of Onset    Miscarriages / Stillbirths Mother      Hypertension Mother      Hypertension Maternal Grandmother      Diabetes Maternal Grandfather      Stroke Maternal Grandfather      Seizures Brother         Social History     Socioeconomic History    Marital status: Single   Tobacco Use    Smoking status: Never    Smokeless tobacco: Never   Substance and Sexual Activity    Alcohol use: Not Currently    Drug use: Yes     Types: Marijuana    Sexual activity: Yes     Partners: Male     Birth control/protection: None   Social History Narrative    Together  since 5/24/2014    Both graduated from [employer]    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 Rescue 18 g 0    prenatal 114-iron a-g-folate 1 (PRENATE ELITE, IRON ASP GLYC,) 20 mg iron- 1 mg Tab Take 1 tablet by mouth once daily. 30 tablet 8     No current facility-administered medications for this visit.       Review of patient's allergies indicates:   Allergen Reactions    Fish containing products Rash       Review of Systems  Review of Systems   Constitutional:  Positive for fatigue. Negative for activity change, appetite change, fever and unexpected weight change.   Respiratory:  Negative for cough, shortness of breath and wheezing.    Cardiovascular:  Negative for chest pain and palpitations.   Gastrointestinal:  Positive for abdominal pain and nausea. Negative for vomiting.   Endocrine: Negative for hot flashes.   Genitourinary:  Positive for frequency, pelvic pain and vaginal discharge. Negative for dysmenorrhea, dyspareunia, urgency, vaginal bleeding and postcoital bleeding.   Musculoskeletal:  Positive for back pain. Negative for myalgias.   Integumentary:  Negative for rash, breast mass and nipple discharge.   Neurological:  Positive for headaches. Negative for seizures.   Psychiatric/Behavioral:  Negative for depression and sleep disturbance. The patient is not nervous/anxious.    Breast: Negative for mass, mastodynia and nipple discharge         Objective   Physical Exam:   Constitutional: She appears well-developed and well-nourished. No distress.   BMI of 30.25    HENT:   Head: Normocephalic and atraumatic.    Eyes: EOM are normal.      Pulmonary/Chest: Effort normal. No respiratory distress.   Breasts: Non-tender, no engorgement, no masses, no retraction, no discharge.  Negative for lymphadenopathy.         Abdominal: Soft. She exhibits no distension. There is no abdominal tenderness. There is no rebound and no guarding.     Genitourinary:    Vagina and uterus normal.   No vaginal discharge in the vagina.    Genitourinary Comments: Vulva without any obvious lesions.  Urethral meatus normal size and location without any lesion.  Urethra is non-tender without stricture or discharge.  Bladder is non-tender.  Vaginal vault with good support.  Minimal white discharge noted.  No obvious lesion.  Normal rugation.  Cervix is without any cervical motion tenderness.  No obvious lesion.  Uterus is small, non-tender, normal contour.  Adnexa is without any masses or tenderness.  Perineum without obvious lesion.               Musculoskeletal: Normal range of motion.       Neurological: She is alert.    Skin: Skin is warm and dry.    Psychiatric: She has a normal mood and affect.            . A transabdominal ultrasound performed showing normal active fetus at 9w1d.  FHT at 178.  This confirms her EDC of 11/18/20204      Assessment and Plan     1. Visit for confirmation of pregnancy test result with physical exam             Plan:  I have discussed with the patient her condition  She is doing well so far in her early pregnancy  She is taking over-the-counter prenatal vitamins; Prescription given  Gonorrhea and chlamydia performed  We will contact her insurance for maternity benefits  [facility] ultrasound requested  She will be back in about 2-4 weeks for follow-up             ** A female chaperone, Nellie William, was present for the pelvic exam

## 2024-04-18 LAB
BACTERIA UR CULT: NORMAL
BACTERIA UR CULT: NORMAL

## 2024-04-20 LAB
C TRACH DNA SPEC QL NAA+PROBE: NOT DETECTED
N GONORRHOEA DNA SPEC QL NAA+PROBE: NOT DETECTED

## 2024-04-25 ENCOUNTER — PROCEDURE VISIT (OUTPATIENT)
Dept: MATERNAL FETAL MEDICINE | Facility: CLINIC | Age: 24
End: 2024-04-25
Payer: MEDICAID

## 2024-04-25 DIAGNOSIS — Z32.00 VISIT FOR CONFIRMATION OF PREGNANCY TEST RESULT WITH PHYSICAL EXAM: ICD-10-CM

## 2024-04-25 DIAGNOSIS — Z36.89 ENCOUNTER FOR FETAL ANATOMIC SURVEY: Primary | ICD-10-CM

## 2024-04-25 PROCEDURE — 76801 OB US < 14 WKS SINGLE FETUS: CPT | Mod: PBBFAC | Performed by: OBSTETRICS & GYNECOLOGY

## 2024-04-30 ENCOUNTER — PATIENT MESSAGE (OUTPATIENT)
Dept: MATERNAL FETAL MEDICINE | Facility: CLINIC | Age: 24
End: 2024-04-30
Payer: MEDICAID

## 2024-05-14 ENCOUNTER — INITIAL PRENATAL (OUTPATIENT)
Dept: OBSTETRICS AND GYNECOLOGY | Facility: CLINIC | Age: 24
End: 2024-05-14
Payer: MEDICAID

## 2024-05-14 ENCOUNTER — LAB VISIT (OUTPATIENT)
Dept: LAB | Facility: HOSPITAL | Age: 24
End: 2024-05-14
Attending: OBSTETRICS & GYNECOLOGY
Payer: MEDICAID

## 2024-05-14 VITALS
SYSTOLIC BLOOD PRESSURE: 120 MMHG | BODY MASS INDEX: 30.07 KG/M2 | DIASTOLIC BLOOD PRESSURE: 62 MMHG | WEIGHT: 186.31 LBS

## 2024-05-14 DIAGNOSIS — Z3A.13 13 WEEKS GESTATION OF PREGNANCY: Primary | ICD-10-CM

## 2024-05-14 DIAGNOSIS — Z87.51 HISTORY OF PRETERM LABOR: ICD-10-CM

## 2024-05-14 DIAGNOSIS — Z32.00 VISIT FOR CONFIRMATION OF PREGNANCY TEST RESULT WITH PHYSICAL EXAM: ICD-10-CM

## 2024-05-14 LAB
ABO + RH BLD: NORMAL
BASOPHILS # BLD AUTO: 0.01 K/UL (ref 0–0.2)
BASOPHILS NFR BLD: 0.1 % (ref 0–1.9)
BLD GP AB SCN CELLS X3 SERPL QL: NORMAL
DIFFERENTIAL METHOD BLD: ABNORMAL
EOSINOPHIL # BLD AUTO: 0.1 K/UL (ref 0–0.5)
EOSINOPHIL NFR BLD: 0.7 % (ref 0–8)
ERYTHROCYTE [DISTWIDTH] IN BLOOD BY AUTOMATED COUNT: 13.2 % (ref 11.5–14.5)
ESTIMATED AVG GLUCOSE: 105 MG/DL (ref 68–131)
HBA1C MFR BLD: 5.3 % (ref 4–5.6)
HCT VFR BLD AUTO: 39.5 % (ref 37–48.5)
HGB BLD-MCNC: 12.1 G/DL (ref 12–16)
IMM GRANULOCYTES # BLD AUTO: 0.02 K/UL (ref 0–0.04)
IMM GRANULOCYTES NFR BLD AUTO: 0.2 % (ref 0–0.5)
LYMPHOCYTES # BLD AUTO: 2.6 K/UL (ref 1–4.8)
LYMPHOCYTES NFR BLD: 27 % (ref 18–48)
MCH RBC QN AUTO: 27.1 PG (ref 27–31)
MCHC RBC AUTO-ENTMCNC: 30.6 G/DL (ref 32–36)
MCV RBC AUTO: 89 FL (ref 82–98)
MONOCYTES # BLD AUTO: 0.5 K/UL (ref 0.3–1)
MONOCYTES NFR BLD: 5.7 % (ref 4–15)
NEUTROPHILS # BLD AUTO: 6.3 K/UL (ref 1.8–7.7)
NEUTROPHILS NFR BLD: 66.3 % (ref 38–73)
NRBC BLD-RTO: 0 /100 WBC
PLATELET # BLD AUTO: 275 K/UL (ref 150–450)
PMV BLD AUTO: 10.3 FL (ref 9.2–12.9)
RBC # BLD AUTO: 4.46 M/UL (ref 4–5.4)
SPECIMEN OUTDATE: NORMAL
WBC # BLD AUTO: 9.53 K/UL (ref 3.9–12.7)

## 2024-05-14 PROCEDURE — 86593 SYPHILIS TEST NON-TREP QUANT: CPT | Performed by: OBSTETRICS & GYNECOLOGY

## 2024-05-14 PROCEDURE — 83036 HEMOGLOBIN GLYCOSYLATED A1C: CPT | Performed by: OBSTETRICS & GYNECOLOGY

## 2024-05-14 PROCEDURE — 99204 OFFICE O/P NEW MOD 45 MIN: CPT | Mod: TH,S$PBB,, | Performed by: OBSTETRICS & GYNECOLOGY

## 2024-05-14 PROCEDURE — 99999 PR PBB SHADOW E&M-EST. PATIENT-LVL III: CPT | Mod: PBBFAC,,, | Performed by: OBSTETRICS & GYNECOLOGY

## 2024-05-14 PROCEDURE — 86850 RBC ANTIBODY SCREEN: CPT | Performed by: OBSTETRICS & GYNECOLOGY

## 2024-05-14 PROCEDURE — 86762 RUBELLA ANTIBODY: CPT | Performed by: OBSTETRICS & GYNECOLOGY

## 2024-05-14 PROCEDURE — 86803 HEPATITIS C AB TEST: CPT | Performed by: OBSTETRICS & GYNECOLOGY

## 2024-05-14 PROCEDURE — 85025 COMPLETE CBC W/AUTO DIFF WBC: CPT | Performed by: OBSTETRICS & GYNECOLOGY

## 2024-05-14 PROCEDURE — 87340 HEPATITIS B SURFACE AG IA: CPT | Performed by: OBSTETRICS & GYNECOLOGY

## 2024-05-14 PROCEDURE — 87389 HIV-1 AG W/HIV-1&-2 AB AG IA: CPT | Performed by: OBSTETRICS & GYNECOLOGY

## 2024-05-14 PROCEDURE — 99213 OFFICE O/P EST LOW 20 MIN: CPT | Mod: PBBFAC,25 | Performed by: OBSTETRICS & GYNECOLOGY

## 2024-05-14 PROCEDURE — 36415 COLL VENOUS BLD VENIPUNCTURE: CPT | Performed by: OBSTETRICS & GYNECOLOGY

## 2024-05-14 RX ORDER — ASPIRIN 81 MG/1
81 TABLET ORAL DAILY
COMMUNITY
Start: 2024-05-14 | End: 2024-06-11 | Stop reason: SDUPTHER

## 2024-05-14 RX ORDER — ASPIRIN 81 MG/1
81 TABLET ORAL DAILY
Qty: 150 TABLET | Refills: 1 | Status: SHIPPED | OUTPATIENT
Start: 2024-05-14 | End: 2025-05-14

## 2024-05-14 NOTE — PROGRESS NOTES
13w4d  Here for prenatal care  Feeling better    Past Medical History:   Diagnosis Date    Asthma     Hypertension affecting pregnancy in third trimester 6/21/2021       History reviewed. No pertinent surgical history.    Family History   Problem Relation Name Age of Onset    Miscarriages / Stillbirths Mother      Hypertension Mother      Hypertension Maternal Grandmother      Diabetes Maternal Grandfather      Stroke Maternal Grandfather      Seizures Brother         Social History     Socioeconomic History    Marital status: Single   Tobacco Use    Smoking status: Never    Smokeless tobacco: Never   Substance and Sexual Activity    Alcohol use: Not Currently    Drug use: Yes     Types: Marijuana    Sexual activity: Yes     Partners: Male     Birth control/protection: None   Social History Narrative    Together since 5/24/2014    Both graduated from [employer]    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 Rescue 18 g 0    prenatal 114-iron a-g-folate 1 (PRENATE ELITE, IRON ASP GLYC,) 20 mg iron- 1 mg Tab Take 1 tablet by mouth once daily. 30 tablet 8    aspirin (ECOTRIN) 81 MG EC tablet Take 1 tablet (81 mg total) by mouth once daily. 150 tablet 1    aspirin (ECOTRIN) 81 MG EC tablet Take 1 tablet (81 mg total) by mouth once daily.       No current facility-administered medications for this visit.       Review of patient's allergies indicates:   Allergen Reactions    Fish containing products Rash     Review of Systems   Constitutional: Positive for fatigue. Negative for fever, chills, appetite change and unexpected weight change.   HENT: Positive for congestion. Negative for hearing loss, ear pain, sore throat, rhinorrhea, sneezing, mouth sores, neck pain, neck stiffness, voice change  and postnasal drip.   Eyes: Negative for photophobia, itching and visual disturbance.   Respiratory: Negative for cough, chest tightness, shortness of breath and wheezing.   Cardiovascular: Negative for chest pain, palpitations and leg swelling.   Gastrointestinal: Positive for nausea, vomiting, abdominal pain and constipation. Negative for diarrhea, blood in stool and abdominal distention.   Genitourinary: Positive for vaginal discharge and pelvic pain. Negative for dysuria, urgency, frequency, hematuria, flank pain, decreased urine volume, vaginal bleeding, difficulty urinating, genital sores, vaginal pain, menstrual problem and dyspareunia.   Musculoskeletal: Positive for back pain. Negative for myalgias and joint swelling.   Skin: Negative for rash and wound.   Neurological: Positive for headaches. Negative for dizziness, tremors, syncope, speech difficulty, weakness, light-headedness and numbness.   Hematological: Negative for adenopathy. Does not bruise/bleed easily.   Psychiatric/Behavioral: Negative for suicidal ideas, hallucinations, confusion, sleep disturbance, dysphoric mood, decreased concentration and agitation. The patient is not nervous/anxious and is not hyperactive.     Exam normal    Prenatal profile today  Prenatal vitamins  Start baby aspirin today  Ultrasound reviewed.  Anatomic ultrasound scheduled  Discussed routine care  Discussed history of     Back in 4 weeks    Vitals signs, FHTs, urine dip, and PE findings documented, reviewed and available in OB flow chart.   I spent a total of 20 minutes on the day of the visit. This includes face to face time and non-face to face time preparing to see the patient (eg, review of tests and charts), Obtaining and/or reviewing separately obtained history, Documenting clinical information in the electronic or other health record, Independently interpreting results and communicating results to the patient/family/caregiver, or Care coordination.

## 2024-05-15 LAB
HBV SURFACE AG SERPL QL IA: NORMAL
HCV AB SERPL QL IA: NORMAL
HIV 1+2 AB+HIV1 P24 AG SERPL QL IA: NORMAL
TREPONEMA PALLIDUM IGG+IGM AB [PRESENCE] IN SERUM OR PLASMA BY IMMUNOASSAY: NONREACTIVE

## 2024-05-16 LAB
RUBV IGG SER-ACNC: 23.7 IU/ML
RUBV IGG SER-IMP: REACTIVE

## 2024-05-30 ENCOUNTER — HOSPITAL ENCOUNTER (EMERGENCY)
Facility: HOSPITAL | Age: 24
Discharge: HOME | End: 2024-05-30
Attending: EMERGENCY MEDICINE
Payer: MEDICAID

## 2024-05-30 VITALS
BODY MASS INDEX: 29.88 KG/M2 | DIASTOLIC BLOOD PRESSURE: 55 MMHG | HEART RATE: 85 BPM | SYSTOLIC BLOOD PRESSURE: 113 MMHG | OXYGEN SATURATION: 99 % | TEMPERATURE: 99 F | HEIGHT: 64 IN | RESPIRATION RATE: 20 BRPM | WEIGHT: 175 LBS

## 2024-05-30 DIAGNOSIS — S39.012A LUMBAR STRAIN, INITIAL ENCOUNTER: Primary | ICD-10-CM

## 2024-05-30 LAB
BACTERIA #/AREA URNS HPF: ABNORMAL /HPF
BILIRUB UR QL STRIP: NEGATIVE
CLARITY UR: ABNORMAL
COLOR UR: YELLOW
GLUCOSE UR QL STRIP: NEGATIVE
HGB UR QL STRIP: NEGATIVE
HYALINE CASTS #/AREA URNS LPF: 0 /LPF
KETONES UR QL STRIP: NEGATIVE
LEUKOCYTE ESTERASE UR QL STRIP: ABNORMAL
MICROSCOPIC COMMENT: ABNORMAL
NITRITE UR QL STRIP: NEGATIVE
PH UR STRIP: 7 [PH] (ref 5–8)
PROT UR QL STRIP: ABNORMAL
RBC #/AREA URNS HPF: 3 /HPF (ref 0–4)
SP GR UR STRIP: 1.03 (ref 1–1.03)
SQUAMOUS #/AREA URNS HPF: 19 /HPF
URN SPEC COLLECT METH UR: ABNORMAL
UROBILINOGEN UR STRIP-ACNC: ABNORMAL EU/DL
WBC #/AREA URNS HPF: 6 /HPF (ref 0–5)

## 2024-05-30 PROCEDURE — 81000 URINALYSIS NONAUTO W/SCOPE: CPT | Performed by: EMERGENCY MEDICINE

## 2024-05-30 PROCEDURE — 25000003 PHARM REV CODE 250: Performed by: EMERGENCY MEDICINE

## 2024-05-30 PROCEDURE — 99283 EMERGENCY DEPT VISIT LOW MDM: CPT

## 2024-05-30 RX ORDER — ACETAMINOPHEN 500 MG
1000 TABLET ORAL
Status: COMPLETED | OUTPATIENT
Start: 2024-05-30 | End: 2024-05-30

## 2024-05-30 RX ADMIN — ACETAMINOPHEN 1000 MG: 500 TABLET ORAL at 07:05

## 2024-05-30 NOTE — ED NOTES
Received into care a 24 y/o female BIB /[facility] with c/o low back pain and lower abdominal pain this morning. Patient states a client was falling and she assisted the patient down to floor and felt a pain shoot across her back.  Patient states she is 15 weeks pregnant .  No vaginal bleeding noted. Ice pack applied to back as ordered.

## 2024-05-30 NOTE — DISCHARGE INSTRUCTIONS

## 2024-05-30 NOTE — Clinical Note
"Fiona"Kaycee Le was seen and treated in our emergency department on 5/30/2024.  She may return to work on 05/31/2024.       If you have any questions or concerns, please don't hesitate to call.      Selene Alvarez MD" DISPLAY PLAN FREE TEXT

## 2024-05-30 NOTE — ED NOTES
Arrives via ems from [facility]. 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 She began to have pain across her lower back shortly after. She is approx. 15 weeks pregnant. C/o mild pain also across lower abdomen. Denies thomas discharge or vaginal bleeding. Dr. Alvarez at bedside on arrival and assessing abdomen with portable ultrasound device-noting good fetal movement and heart ones.

## 2024-05-30 NOTE — ED PROVIDER NOTES
Encounter Date: 5/30/2024       History     Chief Complaint   Patient presents with    Back Pain     C/o pain across lower back after assisting a patient that was falling. Injury occurred this morning.      23-year-old 15w6d pregnant female presents with complaint of back pain.  EMS reports that they brought patient the ER because she was helping an individual at a group home who looked like he was about to fall and that caused her to have sudden onset lower back pain.  Patient says she feels like she pulled a muscle.  Says that she never actually hit the ground. She denies any associated vaginal bleeding or discharge.     The history is provided by the patient and the EMS personnel.     Review of patient's allergies indicates:   Allergen Reactions    Fish containing products Rash     Past Medical History:   Diagnosis Date    Asthma     Hypertension affecting pregnancy in third trimester 6/21/2021     No past surgical history on file.  Family History   Problem Relation Name Age of Onset    Miscarriages / Stillbirths Mother      Hypertension Mother      Hypertension Maternal Grandmother      Diabetes Maternal Grandfather      Stroke Maternal Grandfather      Seizures Brother       Social History     Tobacco Use    Smoking status: Never    Smokeless tobacco: Never   Substance Use Topics    Alcohol use: Not Currently    Drug use: Yes     Types: Marijuana     Review of Systems    Physical Exam     Initial Vitals [05/30/24 0659]   BP Pulse Resp Temp SpO2   110/68 84 18 98.3 °F (36.8 °C) 99 %      MAP       --         Physical Exam    Nursing note and vitals reviewed.  Constitutional: She appears well-developed. She is not diaphoretic. No distress.   HENT:   Head: Normocephalic and atraumatic.   Eyes: EOM are normal. Pupils are equal, round, and reactive to light.   Neck: Neck supple.   Normal range of motion.  Cardiovascular:  Normal rate.           Pulmonary/Chest: No respiratory distress.   Abdominal: Abdomen is soft.  She exhibits no distension. There is no abdominal tenderness.   Musculoskeletal:         General: Normal range of motion.      Cervical back: Normal range of motion and neck supple.      Comments: Reproducible tenderness over bilateral lower lumbar muscles. No L-spine tenderness.     Neurological: She is alert and oriented to person, place, and time.   5/5 strength and sensation intact bilateral LE.   Skin: Skin is warm and dry.   Psychiatric: She has a normal mood and affect.         ED Course   ED US Pelvis OB    Date/Time: 5/30/2024 7:13 AM    Performed by: Selene Alvarez MD  Authorized by: Selene Alvarez MD    Indication:  Pregnancy and Back pain  Identified Structures:  Uterus sagittal, Uterus transverse, Right adnexa, Left adnexa and Hepatorenal space/Canales's Pouch  Definitive IUP:  Present  Uterine Contents:  Fetus  Free fluid in cul-de-sac:  Absent  Free fluid in hepatorenal space:  Absent  Right adnexa:  Normal  Left adnexa:  Normal  Impression:  IUP (with + FHT and fetal movement)    Labs Reviewed   URINALYSIS, REFLEX TO URINE CULTURE - Abnormal; Notable for the following components:       Result Value    Appearance, UA Hazy (*)     Protein, UA 1+ (*)     Urobilinogen, UA 4.0-6.0 (*)     Leukocytes, UA 2+ (*)     All other components within normal limits    Narrative:     Specimen Source->Urine   URINALYSIS MICROSCOPIC - Abnormal; Notable for the following components:    WBC, UA 6 (*)     All other components within normal limits    Narrative:     Specimen Source->Urine          Imaging Results    None          Medications   acetaminophen tablet 1,000 mg (1,000 mg Oral Given 5/30/24 0715)     Medical Decision Making  23-year-old pregnant female presents with complaint of lower back pain, onset after helping another individual who was about to fall.  Differential includes but isn't limited to muscle strain, fracture, UTI, uterine rupture.  Bedside ultrasound shows normal IUP with positive  fetal heart tones and movement.  UA without evidence of infection.  Patient ambulating with a steady gait.  Given strict return precautions and outpatient follow up.    No acute emergent medical condition has been identified. The patient appears to be low risk for an emergent medical condition is appropriate for discharge with outpatient f/u as detailed in discharge instructions for reevaluation and possible continued outpatient workup and management. I have discussed the workup with the patient, who has verbalized understanding of the plan and need for outpatient follow-up.  This evaluation does not preclude the development of an emergent condition so in addition, I have advised the patient that they can return to the ED at any time with worsening or change of their symptoms, or with any other medical complaint.       Amount and/or Complexity of Data Reviewed  Independent Historian: EMS     Details: As documented above   External Data Reviewed: notes.     Details: Seen in the ED 4/7/24 for headache  Labs: ordered. Decision-making details documented in ED Course.    Risk  OTC drugs.  Prescription drug management.               ED Course as of 05/30/24 0917   Thu May 30, 2024   0658 OB  4/25/24: A lott living IUP is identified.  [AT]   0805 Reassessed patient, reminded her that she can be discharged after UA. Ambulated to the bathroom with a steady gait.  [AT]   0831 Bacteria, UA: None  Normal, will not treat given no bacteria [AT]      ED Course User Index  [AT] Selene Alvarez MD                           Clinical Impression:  Final diagnoses:  [S39.012A] Lumbar strain, initial encounter (Primary)          ED Disposition Condition    Discharge Stable          ED Prescriptions    None       Follow-up Information       Follow up With Specialties Details Why Contact Info    Charles Hein MD Pediatrics   [address] [place] [address]  [address]  [place] [address]  [phone]      Your OB, Dr. Mingo Santamaria         [facility] - Emergency Dept Emergency Medicine  As needed, If symptoms worsen [address] [facility] [address]  [phone]             Selene Alvarez MD  05/30/24 0945

## 2024-05-30 NOTE — Clinical Note
"Fiona"Kaycee Le was seen and treated in our emergency department on 5/30/2024.  She may return to work on 05/31/2024.       If you have any questions or concerns, please don't hesitate to call.      Selene Alvarez MD"

## 2024-05-30 NOTE — ED NOTES
Discharged to home in stable condition.  Follow-up instructions given, patient verbalized understanding of all instructions given.

## 2024-05-31 ENCOUNTER — PATIENT MESSAGE (OUTPATIENT)
Dept: OTHER | Facility: OTHER | Age: 24
End: 2024-05-31
Payer: MEDICAID

## 2024-06-07 ENCOUNTER — PATIENT MESSAGE (OUTPATIENT)
Dept: OTHER | Facility: OTHER | Age: 24
End: 2024-06-07
Payer: MEDICAID

## 2024-06-11 ENCOUNTER — ROUTINE PRENATAL (OUTPATIENT)
Dept: OBSTETRICS AND GYNECOLOGY | Facility: CLINIC | Age: 24
End: 2024-06-11
Payer: MEDICAID

## 2024-06-11 VITALS
BODY MASS INDEX: 31.64 KG/M2 | WEIGHT: 184.31 LBS | DIASTOLIC BLOOD PRESSURE: 68 MMHG | SYSTOLIC BLOOD PRESSURE: 122 MMHG

## 2024-06-11 DIAGNOSIS — Z87.51 HISTORY OF PRETERM LABOR: ICD-10-CM

## 2024-06-11 DIAGNOSIS — Z3A.17 17 WEEKS GESTATION OF PREGNANCY: Primary | ICD-10-CM

## 2024-06-11 DIAGNOSIS — Z87.59 HISTORY OF PREGNANCY INDUCED HYPERTENSION: ICD-10-CM

## 2024-06-11 PROCEDURE — 99213 OFFICE O/P EST LOW 20 MIN: CPT | Mod: PBBFAC | Performed by: OBSTETRICS & GYNECOLOGY

## 2024-06-11 PROCEDURE — 99999 PR PBB SHADOW E&M-EST. PATIENT-LVL III: CPT | Mod: PBBFAC,,, | Performed by: OBSTETRICS & GYNECOLOGY

## 2024-06-11 NOTE — LETTER
June 11, 2024      [facility]  [address]   [place] [address]  Phone: [phone]       Patient: Fiona Le   YOB: 2000  Date of Visit: 06/11/2024    To Whom It May Concern:    Laureano Le  was at [facility] on 06/11/2024. The patient may return to work/school on 6/12/2024 with no restrictions. If you have any questions or concerns, or if I can be of further assistance, please do not hesitate to contact me.    Sincerely,          Mingo Santamaria MD

## 2024-06-11 NOTE — PROGRESS NOTES
17w4d  Patient comes in today for follow-up prenatal care  No new complaint.  No vaginal bleeding, contractions, or rupture of membranes.  Good fetal movements.    Eating well without significant nausea/vomiting  Not gaining weight   Taking prenatal vitamins, iron supplement, and baby aspirin    Has not been doing Connected MOM    Discussed with patient MFM's findings and recommendations  Discussed history of  labor, delivery  Discussed history of pregnancy-induced hypertension  Maternal quad screen    Back in 4 weeks    Vitals signs, FHTs, urine dip, and PE findings documented, reviewed and available in OB flow chart.   I spent a total of 20 minutes on the day of the visit. This includes face to face time and non-face to face time preparing to see the patient (eg, review of tests and charts), Obtaining and/or reviewing separately obtained history, Documenting clinical information in the electronic or other health record, Independently interpreting results and communicating results to the patient/family/caregiver, or Care coordination.

## 2024-06-28 ENCOUNTER — PATIENT MESSAGE (OUTPATIENT)
Dept: OTHER | Facility: OTHER | Age: 24
End: 2024-06-28
Payer: MEDICAID

## 2024-07-03 ENCOUNTER — PROCEDURE VISIT (OUTPATIENT)
Dept: MATERNAL FETAL MEDICINE | Facility: CLINIC | Age: 24
End: 2024-07-03
Payer: MEDICAID

## 2024-07-03 DIAGNOSIS — Z36.89 ENCOUNTER FOR FETAL ANATOMIC SURVEY: ICD-10-CM

## 2024-07-03 DIAGNOSIS — Z32.00 VISIT FOR CONFIRMATION OF PREGNANCY TEST RESULT WITH PHYSICAL EXAM: Primary | ICD-10-CM

## 2024-07-03 PROCEDURE — 76817 TRANSVAGINAL US OBSTETRIC: CPT | Mod: PBBFAC | Performed by: OBSTETRICS & GYNECOLOGY

## 2024-07-09 ENCOUNTER — ROUTINE PRENATAL (OUTPATIENT)
Dept: OBSTETRICS AND GYNECOLOGY | Facility: CLINIC | Age: 24
End: 2024-07-09
Payer: MEDICAID

## 2024-07-09 VITALS — SYSTOLIC BLOOD PRESSURE: 104 MMHG | DIASTOLIC BLOOD PRESSURE: 62 MMHG | BODY MASS INDEX: 32.35 KG/M2 | WEIGHT: 188.5 LBS

## 2024-07-09 DIAGNOSIS — Z34.92 SECOND TRIMESTER PREGNANCY: ICD-10-CM

## 2024-07-09 DIAGNOSIS — Z87.59 HISTORY OF PREGNANCY INDUCED HYPERTENSION: ICD-10-CM

## 2024-07-09 DIAGNOSIS — Z3A.21 21 WEEKS GESTATION OF PREGNANCY: Primary | ICD-10-CM

## 2024-07-09 DIAGNOSIS — Z87.51 HISTORY OF PRETERM LABOR: ICD-10-CM

## 2024-07-09 PROCEDURE — 99999 PR PBB SHADOW E&M-EST. PATIENT-LVL III: CPT | Mod: PBBFAC,,, | Performed by: OBSTETRICS & GYNECOLOGY

## 2024-07-09 PROCEDURE — 99213 OFFICE O/P EST LOW 20 MIN: CPT | Mod: PBBFAC,TH | Performed by: OBSTETRICS & GYNECOLOGY

## 2024-07-09 NOTE — PROGRESS NOTES
21w4d  Patient comes in today for follow-up prenatal care  No new complaint.  No vaginal bleeding, contractions, or rupture of membranes.  Good fetal movements.    Eating well without significant nausea/vomiting  Not gaining weight   Taking prenatal vitamins, iron supplement, and baby aspirin    Has not been doing Connected MOM.  Old equipment broke.  Maternal quad screen not done    Discussed with patient MFM's findings and recommendations  Discussed history of  labor, delivery  Discussed history of pregnancy-induced hypertension  Discussed Connected MOM    Back in 4 weeks    Vitals signs, FHTs, urine dip, and PE findings documented, reviewed and available in OB flow chart.   I spent a total of 20 minutes on the day of the visit. This includes face to face time and non-face to face time preparing to see the patient (eg, review of tests and charts), Obtaining and/or reviewing separately obtained history, Documenting clinical information in the electronic or other health record, Independently interpreting results and communicating results to the patient/family/caregiver, or Care coordination.

## 2024-07-18 ENCOUNTER — PROCEDURE VISIT (OUTPATIENT)
Dept: MATERNAL FETAL MEDICINE | Facility: CLINIC | Age: 24
End: 2024-07-18
Payer: MEDICAID

## 2024-07-18 DIAGNOSIS — Z32.00 VISIT FOR CONFIRMATION OF PREGNANCY TEST RESULT WITH PHYSICAL EXAM: ICD-10-CM

## 2024-07-18 DIAGNOSIS — Z36.89 ENCOUNTER FOR FETAL ANATOMIC SURVEY: ICD-10-CM

## 2024-07-18 PROCEDURE — 76815 OB US LIMITED FETUS(S): CPT | Mod: 26,S$PBB,, | Performed by: STUDENT IN AN ORGANIZED HEALTH CARE EDUCATION/TRAINING PROGRAM

## 2024-07-18 PROCEDURE — 76817 TRANSVAGINAL US OBSTETRIC: CPT | Mod: PBBFAC | Performed by: STUDENT IN AN ORGANIZED HEALTH CARE EDUCATION/TRAINING PROGRAM

## 2024-07-26 ENCOUNTER — PATIENT MESSAGE (OUTPATIENT)
Dept: OTHER | Facility: OTHER | Age: 24
End: 2024-07-26
Payer: MEDICAID

## 2024-07-31 ENCOUNTER — PROCEDURE VISIT (OUTPATIENT)
Dept: MATERNAL FETAL MEDICINE | Facility: CLINIC | Age: 24
End: 2024-07-31
Payer: MEDICAID

## 2024-07-31 DIAGNOSIS — Z36.89 ENCOUNTER FOR FETAL ANATOMIC SURVEY: ICD-10-CM

## 2024-07-31 DIAGNOSIS — Z32.00 VISIT FOR CONFIRMATION OF PREGNANCY TEST RESULT WITH PHYSICAL EXAM: ICD-10-CM

## 2024-07-31 PROCEDURE — 76816 OB US FOLLOW-UP PER FETUS: CPT | Mod: PBBFAC | Performed by: OBSTETRICS & GYNECOLOGY

## 2024-08-02 ENCOUNTER — PATIENT MESSAGE (OUTPATIENT)
Dept: OBSTETRICS AND GYNECOLOGY | Facility: CLINIC | Age: 24
End: 2024-08-02
Payer: MEDICAID

## 2024-08-07 ENCOUNTER — ROUTINE PRENATAL (OUTPATIENT)
Dept: OBSTETRICS AND GYNECOLOGY | Facility: CLINIC | Age: 24
End: 2024-08-07
Payer: MEDICAID

## 2024-08-07 VITALS
SYSTOLIC BLOOD PRESSURE: 122 MMHG | BODY MASS INDEX: 33.26 KG/M2 | WEIGHT: 193.81 LBS | DIASTOLIC BLOOD PRESSURE: 74 MMHG

## 2024-08-07 DIAGNOSIS — Z87.59 HISTORY OF PREGNANCY INDUCED HYPERTENSION: ICD-10-CM

## 2024-08-07 DIAGNOSIS — Z3A.25 25 WEEKS GESTATION OF PREGNANCY: Primary | ICD-10-CM

## 2024-08-07 DIAGNOSIS — Z87.51 HISTORY OF PRETERM LABOR: ICD-10-CM

## 2024-08-07 DIAGNOSIS — Z34.92 SECOND TRIMESTER PREGNANCY: ICD-10-CM

## 2024-08-07 PROCEDURE — 99213 OFFICE O/P EST LOW 20 MIN: CPT | Mod: PBBFAC,TH | Performed by: OBSTETRICS & GYNECOLOGY

## 2024-08-07 PROCEDURE — 99999 PR PBB SHADOW E&M-EST. PATIENT-LVL III: CPT | Mod: PBBFAC,,, | Performed by: OBSTETRICS & GYNECOLOGY

## 2024-08-09 ENCOUNTER — PATIENT MESSAGE (OUTPATIENT)
Dept: OTHER | Facility: OTHER | Age: 24
End: 2024-08-09
Payer: MEDICAID

## 2024-08-21 ENCOUNTER — ROUTINE PRENATAL (OUTPATIENT)
Dept: OBSTETRICS AND GYNECOLOGY | Facility: CLINIC | Age: 24
End: 2024-08-21
Payer: MEDICAID

## 2024-08-21 ENCOUNTER — CLINICAL SUPPORT (OUTPATIENT)
Dept: OBSTETRICS AND GYNECOLOGY | Facility: CLINIC | Age: 24
End: 2024-08-21
Payer: MEDICAID

## 2024-08-21 VITALS — SYSTOLIC BLOOD PRESSURE: 116 MMHG | DIASTOLIC BLOOD PRESSURE: 62 MMHG | BODY MASS INDEX: 33.3 KG/M2 | WEIGHT: 194 LBS

## 2024-08-21 DIAGNOSIS — Z23 NEED FOR TDAP VACCINATION: Primary | ICD-10-CM

## 2024-08-21 DIAGNOSIS — Z87.51 HISTORY OF PRETERM LABOR: ICD-10-CM

## 2024-08-21 DIAGNOSIS — Z87.59 HISTORY OF PREGNANCY INDUCED HYPERTENSION: ICD-10-CM

## 2024-08-21 DIAGNOSIS — Z34.92 SECOND TRIMESTER PREGNANCY: ICD-10-CM

## 2024-08-21 DIAGNOSIS — Z3A.27 27 WEEKS GESTATION OF PREGNANCY: Primary | ICD-10-CM

## 2024-08-21 PROCEDURE — 99213 OFFICE O/P EST LOW 20 MIN: CPT | Mod: PBBFAC,TH | Performed by: OBSTETRICS & GYNECOLOGY

## 2024-08-21 PROCEDURE — 90471 IMMUNIZATION ADMIN: CPT | Mod: PBBFAC

## 2024-08-21 PROCEDURE — 90715 TDAP VACCINE 7 YRS/> IM: CPT | Mod: PBBFAC

## 2024-08-21 PROCEDURE — 99999PBSHW PR PBB SHADOW TECHNICAL ONLY FILED TO HB: Mod: PBBFAC,,,

## 2024-08-21 PROCEDURE — 99999 PR PBB SHADOW E&M-EST. PATIENT-LVL III: CPT | Mod: PBBFAC,,, | Performed by: OBSTETRICS & GYNECOLOGY

## 2024-08-21 RX ADMIN — TETANUS TOXOID, REDUCED DIPHTHERIA TOXOID AND ACELLULAR PERTUSSIS VACCINE, ADSORBED 0.5 ML: 5; 2.5; 8; 8; 2.5 SUSPENSION INTRAMUSCULAR at 04:08

## 2024-08-21 NOTE — PROGRESS NOTES
27w5d  Patient comes in today for follow-up prenatal care  No new complaint.  No vaginal bleeding, contractions, or rupture of membranes.  Good fetal movements.    Eating well without significant nausea/vomiting  Gaining weight   Taking prenatal vitamins, iron supplement, and baby aspirin    Has not been doing Connected MOM.  Old equipment broke.  Maternal quad screen not done    Discussed with patient MFM's findings and recommendations  Discussed history of  labor, delivery  Discussed history of pregnancy-induced hypertension  Discussed Connected MOM  OGTT soon  Tdap today  Back in 2 weeks    Vitals signs, FHTs, urine dip, and PE findings documented, reviewed and available in OB flow chart.   I spent a total of 20 minutes on the day of the visit. This includes face to face time and non-face to face time preparing to see the patient (eg, review of tests and charts), Obtaining and/or reviewing separately obtained history, Documenting clinical information in the electronic or other health record, Independently interpreting results and communicating results to the patient/family/caregiver, or Care coordination.

## 2024-08-21 NOTE — PROGRESS NOTES
TDap Vaccine administered without difficulty. VIS given. Pt instructed to sit in waiting room for 15 minutes to monitor for s/s of adverse reaction.

## 2024-08-23 ENCOUNTER — PATIENT MESSAGE (OUTPATIENT)
Dept: OTHER | Facility: OTHER | Age: 24
End: 2024-08-23
Payer: MEDICAID

## 2024-08-26 ENCOUNTER — HOSPITAL ENCOUNTER (OUTPATIENT)
Facility: HOSPITAL | Age: 24
Discharge: HOME | End: 2024-08-26
Attending: OBSTETRICS & GYNECOLOGY | Admitting: OBSTETRICS & GYNECOLOGY
Payer: MEDICAID

## 2024-08-26 VITALS — HEART RATE: 86 BPM | DIASTOLIC BLOOD PRESSURE: 68 MMHG | SYSTOLIC BLOOD PRESSURE: 119 MMHG

## 2024-08-26 DIAGNOSIS — Z34.90 PREGNANCY: ICD-10-CM

## 2024-08-26 LAB
BILIRUB UR QL STRIP: NEGATIVE
CLARITY UR: CLEAR
COLOR UR: COLORLESS
GLUCOSE UR QL STRIP: NEGATIVE
HGB UR QL STRIP: NEGATIVE
KETONES UR QL STRIP: NEGATIVE
LEUKOCYTE ESTERASE UR QL STRIP: NEGATIVE
NITRITE UR QL STRIP: NEGATIVE
PH UR STRIP: 7 [PH] (ref 5–8)
PROT UR QL STRIP: NEGATIVE
SP GR UR STRIP: 1 (ref 1–1.03)
URN SPEC COLLECT METH UR: ABNORMAL
UROBILINOGEN UR STRIP-ACNC: NEGATIVE EU/DL

## 2024-08-26 PROCEDURE — 99211 OFF/OP EST MAY X REQ PHY/QHP: CPT | Mod: 25

## 2024-08-26 PROCEDURE — 59025 FETAL NON-STRESS TEST: CPT

## 2024-08-26 PROCEDURE — 81003 URINALYSIS AUTO W/O SCOPE: CPT | Performed by: OBSTETRICS & GYNECOLOGY

## 2024-08-26 NOTE — DISCHARGE INSTRUCTIONS
Home Undelivered Discharge Instructions    After Discharge Orders:    Future Appointments   Date Time Provider Department Center   2024 11:15 AM Mingo Santamaria MD [facility] JOHN [facility]   10/1/2024 11:15 AM [facility]   10/15/2024 11:15 AM Mingo Santamaria MD [facility] JOHN [facility]   10/22/2024 11:15 AM Mingo Santamaria MD [facility]   10/29/2024 11:15 AM [facility]   2024 11:15 AM Mingo Santamaria MD [facility] JOHN [facility]   2024 11:15 AM [facility]       Call physician or midwife's office for instructions.    Current Discharge Medication List        CONTINUE these medications which have NOT CHANGED    Details   albuterol (PROVENTIL/VENTOLIN HFA) 90 mcg/actuation inhaler Inhale 1-2 puffs into the lungs every 6 (six) hours as needed for Wheezing. Rescue  Qty: 18 g, Refills: 0      aspirin (ECOTRIN) 81 MG EC tablet Take 1 tablet (81 mg total) by mouth once daily.  Qty: 150 tablet, Refills: 1    Associated Diagnoses: 13 weeks gestation of pregnancy; History of  labor      prenatal 114-iron a-g-folate 1 (PRENATE ELITE, IRON ASP GLYC,) 20 mg iron- 1 mg Tab Take 1 tablet by mouth once daily.  Qty: 30 tablet, Refills: 8    Comments: Please give patient medication covered by her insurance  Associated Diagnoses: Visit for confirmation of pregnancy test result with physical exam                     Diet:  normal diet as tolerated    Rest: normal activity as tolerated    Other instructions: Do kick counts once a day on your baby. Choose the time of day your baby is most active. Get in a comfortable lying or sitting position and time how long it takes to feel 10 kicks, twists, turns, swishes, or rolls. Call your physician or midwife if there have not been 10 kicks in 1 hours    Call physician or midwife, return to Labor and Delivery, call 911, or go to the nearest Emergency Room if: increased leakage or fluid,  contractions more than  10 per  1 hour, decreased fetal movement, persistent low back pain or cramping, bleeding from vaginal area, difficulty urinating, pain with urination, difficulty breathing, new calf pain, persistent headache, or vision change

## 2024-08-26 NOTE — NURSING
Patient History  GA: 28w3d   GP:    Estimated Date of Delivery: 11/15/24   Antepartum complications:   Past Medical History:   Diagnosis Date    Asthma     Hypertension affecting pregnancy in third trimester 2021       OB History    Para Term  AB Living   4 3 1 2   3   SAB IAB Ectopic Multiple Live Births         0 3      # Outcome Date GA Lbr Nahid/2nd Weight Sex Type Anes PTL Lv   4 Current            3  22 35w2d  3.03 kg (6 lb 10.9 oz) M Vag-Spont EPI Y DOUG   2  21 34w1d 01:18 / 00:04 1.82 kg (4 lb 0.2 oz) F Vag-Spont EPI Y DOUG   1 Term 16 38w5d  3.738 kg (8 lb 3.9 oz) M Vag-Vacuum EPI N ODUG      VS: Pulse:  [84] 84  BP: (125)/(72) 125/72   Complaints: pt presented with c/o ctx since yesterday, pt denies lof, or vag. bleeding       Number of past c-sections:   History of past pregnancy complications:   Pain level on admit:   Pain reassessment:   Fetal movement:   Uterine Activity   Contraction intensity:  Contraction Frequency (minutes):   Contractions per 10 minutes:  Contraction Duration (seconds):   Uterine tone:   FHR baseline:   Variability:   Accels:   Decels:   SVE  Dilation:   Effacement:  Station:   Name of second nurse verification of strip:

## 2024-09-06 ENCOUNTER — PATIENT MESSAGE (OUTPATIENT)
Dept: OTHER | Facility: OTHER | Age: 24
End: 2024-09-06
Payer: MEDICAID

## 2024-09-11 ENCOUNTER — HOSPITAL ENCOUNTER (OUTPATIENT)
Facility: HOSPITAL | Age: 24
Discharge: HOME | End: 2024-09-11
Attending: OBSTETRICS & GYNECOLOGY | Admitting: OBSTETRICS & GYNECOLOGY
Payer: MEDICAID

## 2024-09-11 VITALS
TEMPERATURE: 98 F | BODY MASS INDEX: 33.12 KG/M2 | SYSTOLIC BLOOD PRESSURE: 117 MMHG | OXYGEN SATURATION: 98 % | HEIGHT: 64 IN | WEIGHT: 194 LBS | HEART RATE: 81 BPM | DIASTOLIC BLOOD PRESSURE: 74 MMHG | RESPIRATION RATE: 18 BRPM

## 2024-09-11 DIAGNOSIS — O26.899 ABDOMINAL PAIN AFFECTING PREGNANCY: ICD-10-CM

## 2024-09-11 DIAGNOSIS — R10.9 ABDOMINAL PAIN AFFECTING PREGNANCY: ICD-10-CM

## 2024-09-11 LAB
BILIRUB UR QL STRIP: NEGATIVE
CLARITY UR: CLEAR
COLOR UR: COLORLESS
GLUCOSE UR QL STRIP: NEGATIVE
HGB UR QL STRIP: NEGATIVE
KETONES UR QL STRIP: NEGATIVE
LEUKOCYTE ESTERASE UR QL STRIP: ABNORMAL
MICROSCOPIC COMMENT: ABNORMAL
NITRITE UR QL STRIP: NEGATIVE
PH UR STRIP: 7 [PH] (ref 5–8)
PROT UR QL STRIP: NEGATIVE
SP GR UR STRIP: <1.005 (ref 1–1.03)
SQUAMOUS #/AREA URNS HPF: 5 /HPF
URN SPEC COLLECT METH UR: ABNORMAL
UROBILINOGEN UR STRIP-ACNC: NEGATIVE EU/DL
WBC #/AREA URNS HPF: 9 /HPF (ref 0–5)

## 2024-09-11 PROCEDURE — 99211 OFF/OP EST MAY X REQ PHY/QHP: CPT | Mod: 25

## 2024-09-11 PROCEDURE — 81000 URINALYSIS NONAUTO W/SCOPE: CPT | Performed by: OBSTETRICS & GYNECOLOGY

## 2024-09-11 PROCEDURE — 63600175 PHARM REV CODE 636 W HCPCS: Performed by: OBSTETRICS & GYNECOLOGY

## 2024-09-11 PROCEDURE — 59025 FETAL NON-STRESS TEST: CPT

## 2024-09-11 PROCEDURE — 25000003 PHARM REV CODE 250: Performed by: OBSTETRICS & GYNECOLOGY

## 2024-09-11 PROCEDURE — 96361 HYDRATE IV INFUSION ADD-ON: CPT

## 2024-09-11 PROCEDURE — 96360 HYDRATION IV INFUSION INIT: CPT

## 2024-09-11 RX ORDER — ACETAMINOPHEN 500 MG
1000 TABLET ORAL EVERY 8 HOURS PRN
Status: DISCONTINUED | OUTPATIENT
Start: 2024-09-11 | End: 2024-09-11 | Stop reason: HOSPADM

## 2024-09-11 RX ORDER — SODIUM CHLORIDE, SODIUM LACTATE, POTASSIUM CHLORIDE, CALCIUM CHLORIDE 600; 310; 30; 20 MG/100ML; MG/100ML; MG/100ML; MG/100ML
INJECTION, SOLUTION INTRAVENOUS CONTINUOUS
Status: DISCONTINUED | OUTPATIENT
Start: 2024-09-11 | End: 2024-09-11 | Stop reason: HOSPADM

## 2024-09-11 RX ADMIN — ACETAMINOPHEN 1000 MG: 500 TABLET ORAL at 02:09

## 2024-09-11 RX ADMIN — SODIUM CHLORIDE, POTASSIUM CHLORIDE, SODIUM LACTATE AND CALCIUM CHLORIDE: 600; 310; 30; 20 INJECTION, SOLUTION INTRAVENOUS at 02:09

## 2024-09-11 RX ADMIN — SODIUM CHLORIDE, POTASSIUM CHLORIDE, SODIUM LACTATE AND CALCIUM CHLORIDE: 600; 310; 30; 20 INJECTION, SOLUTION INTRAVENOUS at 01:09

## 2024-09-11 NOTE — DISCHARGE INSTRUCTIONS
Home Undelivered Discharge Instructions    After Discharge Orders:    Future Appointments   Date Time Provider Department Center   2024 11:15 AM Mingo Santamaria MD [facility] JOHN [facility]   10/1/2024 11:15 AM [facility]   10/15/2024 11:15 AM Mingo Santamaria MD [facility] JOHN [facility]   10/22/2024 11:15 AM [facility]   10/29/2024 11:15 AM [facility]   2024 11:15 AM Mingo Santamaria MD [facility] JOHN [facility]   2024 11:15 AM [facility]       Current Discharge Medication List        CONTINUE these medications which have NOT CHANGED    Details   albuterol (PROVENTIL/VENTOLIN HFA) 90 mcg/actuation inhaler Inhale 1-2 puffs into the lungs every 6 (six) hours as needed for Wheezing. Rescue  Qty: 18 g, Refills: 0      aspirin (ECOTRIN) 81 MG EC tablet Take 1 tablet (81 mg total) by mouth once daily.  Qty: 150 tablet, Refills: 1    Associated Diagnoses: 13 weeks gestation of pregnancy; History of  labor      prenatal 114-iron a-g-folate 1 (PRENATE ELITE, IRON ASP GLYC,) 20 mg iron- 1 mg Tab Take 1 tablet by mouth once daily.  Qty: 30 tablet, Refills: 8    Comments: Please give patient medication covered by her insurance  Associated Diagnoses: Visit for confirmation of pregnancy test result with physical exam                     Diet:  normal diet as tolerated    Rest: normal activity as tolerated    Other instructions: Do kick counts once a day on your baby. Choose the time of day your baby is most active. Get in a comfortable lying or sitting position and time how long it takes to feel 10 kicks, twists, turns, swishes, or rolls. Call your physician or midwife if there have not been 10 kicks in 1 hours    Call physician or midwife, return to Labor and Delivery, call 911, or go to the nearest Emergency Room if: increased leakage or fluid, decreased fetal movement, persistent low back pain or  cramping, bleeding from vaginal area, difficulty urinating, pain with urination, difficulty breathing, new calf pain, persistent headache, or vision change, abdominal pain, or contractions.

## 2024-09-11 NOTE — NURSING
[id number]: This RN at bedside. Pt sitting up in bed, aaox3. Pt c/o lower abd pain that started around 10pm. Pt describes abd pain as contraction pain. Pt denies LOF from vagina and/or vaginla bleeding. Pt reports active FM.   Pt states she feels like she has to urinate frequently. Pt denies pain or discomfort w/ urination.   H&P, meds, and allergies reviewed w/ pt. Pt reports h/o asthma, states last exacerbation was about 1 yr ago.   Cont efm placed w/ pulse ox. Mild ctx palpated before placing efm. Abd soft and nontender at resting tone. BP cycling q15min.   SVE 0.5/TH/H.   Call bell within reach and pt instructed on use, PO Hydration provided and encouraged.

## 2024-09-11 NOTE — NURSING
Dr. Vanegas called. Report given. MD notified irregular ctxs noted per toco, FHTs reactive and reassuring, received 1.25 L of LR, SVE unchanged after 2 hrs. MD gave order to d/c pt home. Order received.

## 2024-09-11 NOTE — NURSING
0330 This RN at bedside. Verbal d/c instructions given w/ printed handout. Strict labor precautions discussed. Pt instructed on how to time ctxs from start of 1 ctx to the start of next, inclement weather discussed and pt instructed to return to hospital if ctxs become regular and more frequent. Pt stated she lives near hospital and verbalized understanding.     0337 pt ambulated off unit w/ printed d/c instructions.

## 2024-09-17 ENCOUNTER — ROUTINE PRENATAL (OUTPATIENT)
Dept: OBSTETRICS AND GYNECOLOGY | Facility: CLINIC | Age: 24
End: 2024-09-17
Payer: MEDICAID

## 2024-09-17 VITALS — WEIGHT: 196 LBS | SYSTOLIC BLOOD PRESSURE: 120 MMHG | BODY MASS INDEX: 33.64 KG/M2 | DIASTOLIC BLOOD PRESSURE: 68 MMHG

## 2024-09-17 DIAGNOSIS — Z87.59 HISTORY OF PREGNANCY INDUCED HYPERTENSION: ICD-10-CM

## 2024-09-17 DIAGNOSIS — Z3A.31 31 WEEKS GESTATION OF PREGNANCY: Primary | ICD-10-CM

## 2024-09-17 DIAGNOSIS — Z34.93 THIRD TRIMESTER PREGNANCY: ICD-10-CM

## 2024-09-17 DIAGNOSIS — Z87.51 HISTORY OF PRETERM LABOR: ICD-10-CM

## 2024-09-17 PROCEDURE — 99213 OFFICE O/P EST LOW 20 MIN: CPT | Mod: TH,S$PBB,, | Performed by: OBSTETRICS & GYNECOLOGY

## 2024-09-17 PROCEDURE — 99213 OFFICE O/P EST LOW 20 MIN: CPT | Mod: PBBFAC,TH | Performed by: OBSTETRICS & GYNECOLOGY

## 2024-09-17 PROCEDURE — 99999 PR PBB SHADOW E&M-EST. PATIENT-LVL III: CPT | Mod: PBBFAC,,, | Performed by: OBSTETRICS & GYNECOLOGY

## 2024-09-17 NOTE — PROGRESS NOTES
OB here for routine exam. Pt with pelvic pain and pressure.  She has been going in and of the ER for pelvic pain and pressure. laurita

## 2024-09-17 NOTE — PROGRESS NOTES
31w4d  Patient comes in today for follow-up prenatal care  Started to have some contractions.  No vaginal bleeding, contractions, or rupture of membranes.  Good fetal movements.  Went to our facility twice since her last visit.  Discharged twice.    Eating well without significant nausea/vomiting  Gaining weight   Taking prenatal vitamins, iron supplement, and baby aspirin  Tdap 2024    Has not been doing Connected MOM.  Old equipment broke.  Maternal quad screen not done    Cervix is closed and soft    Discussed with patient MFM's findings and recommendations  Discussed history of  labor, delivery  Discussed history of pregnancy-induced hypertension  Discussed Connected MOM  OGTT soon  Discussed  labor and medications  Labor precautions  Fetal kick count  Back in 2 weeks    Vitals signs, FHTs, urine dip, and PE findings documented, reviewed and available in OB flow chart.   I spent a total of 20 minutes on the day of the visit. This includes face to face time and non-face to face time preparing to see the patient (eg, review of tests and charts), Obtaining and/or reviewing separately obtained history, Documenting clinical information in the electronic or other health record, Independently interpreting results and communicating results to the patient/family/caregiver, or Care coordination.

## 2024-09-20 ENCOUNTER — PATIENT MESSAGE (OUTPATIENT)
Dept: OTHER | Facility: OTHER | Age: 24
End: 2024-09-20
Payer: MEDICAID

## 2024-10-01 ENCOUNTER — ROUTINE PRENATAL (OUTPATIENT)
Dept: OBSTETRICS AND GYNECOLOGY | Facility: CLINIC | Age: 24
End: 2024-10-01
Payer: MEDICAID

## 2024-10-01 VITALS
DIASTOLIC BLOOD PRESSURE: 74 MMHG | SYSTOLIC BLOOD PRESSURE: 120 MMHG | BODY MASS INDEX: 34.06 KG/M2 | WEIGHT: 198.44 LBS

## 2024-10-01 DIAGNOSIS — Z87.51 HISTORY OF PRETERM LABOR: ICD-10-CM

## 2024-10-01 DIAGNOSIS — Z3A.33 33 WEEKS GESTATION OF PREGNANCY: Primary | ICD-10-CM

## 2024-10-01 DIAGNOSIS — Z34.93 THIRD TRIMESTER PREGNANCY: ICD-10-CM

## 2024-10-01 DIAGNOSIS — Z87.59 HISTORY OF PREGNANCY INDUCED HYPERTENSION: ICD-10-CM

## 2024-10-01 PROCEDURE — 99213 OFFICE O/P EST LOW 20 MIN: CPT | Mod: TH,S$PBB,, | Performed by: OBSTETRICS & GYNECOLOGY

## 2024-10-01 PROCEDURE — 99213 OFFICE O/P EST LOW 20 MIN: CPT | Mod: PBBFAC,TH | Performed by: OBSTETRICS & GYNECOLOGY

## 2024-10-01 PROCEDURE — 99999 PR PBB SHADOW E&M-EST. PATIENT-LVL III: CPT | Mod: PBBFAC,,, | Performed by: OBSTETRICS & GYNECOLOGY

## 2024-10-01 NOTE — PROGRESS NOTES
33w4d  Patient comes in today for follow-up prenatal care  Has had some contractions.  No vaginal bleeding, contractions, or rupture of membranes.  Good fetal movements.    Eating well without significant nausea/vomiting  Gaining weight   Taking prenatal vitamins, iron supplement, and baby aspirin  Tdap 2024    Has not been doing Connected MOM.  Old equipment broke.  Maternal quad screen not done    Cervix is closed and soft.  [place]     Discussed with patient MFM's findings and recommendations  Discussed history of  labor, delivery  Discussed history of pregnancy-induced hypertension  Discussed Connected MOM  OGTT soon  Discussed  labor and medications  Labor precautions  Fetal kick count  Back in 2 weeks    Vitals signs, FHTs, urine dip, and PE findings documented, reviewed and available in OB flow chart.   I spent a total of 20 minutes on the day of the visit. This includes face to face time and non-face to face time preparing to see the patient (eg, review of tests and charts), Obtaining and/or reviewing separately obtained history, Documenting clinical information in the electronic or other health record, Independently interpreting results and communicating results to the patient/family/caregiver, or Care coordination.

## 2024-10-11 ENCOUNTER — PATIENT MESSAGE (OUTPATIENT)
Dept: OTHER | Facility: OTHER | Age: 24
End: 2024-10-11
Payer: MEDICAID

## 2024-10-12 ENCOUNTER — HOSPITAL ENCOUNTER (OUTPATIENT)
Facility: HOSPITAL | Age: 24
Discharge: HOME | End: 2024-10-12
Attending: EMERGENCY MEDICINE | Admitting: OBSTETRICS & GYNECOLOGY
Payer: MEDICAID

## 2024-10-12 VITALS
SYSTOLIC BLOOD PRESSURE: 115 MMHG | TEMPERATURE: 99 F | OXYGEN SATURATION: 100 % | HEART RATE: 78 BPM | DIASTOLIC BLOOD PRESSURE: 68 MMHG | RESPIRATION RATE: 18 BRPM

## 2024-10-12 DIAGNOSIS — Z34.90 PREGNANCY: ICD-10-CM

## 2024-10-12 LAB
BACTERIA GENITAL QL WET PREP: NORMAL
BILIRUB UR QL STRIP: NEGATIVE
CLARITY UR: CLEAR
CLUE CELLS VAG QL WET PREP: NORMAL
COLOR UR: YELLOW
FILAMENT FUNGI VAG WET PREP-#/AREA: NORMAL
GLUCOSE UR QL STRIP: NEGATIVE
HGB UR QL STRIP: NEGATIVE
KETONES UR QL STRIP: ABNORMAL
LEUKOCYTE ESTERASE UR QL STRIP: NEGATIVE
NITRITE UR QL STRIP: NEGATIVE
PH UR STRIP: 6 [PH] (ref 5–8)
PROT UR QL STRIP: ABNORMAL
SP GR UR STRIP: 1.03 (ref 1–1.03)
SPECIMEN SOURCE: NORMAL
T VAGINALIS GENITAL QL WET PREP: NORMAL
URN SPEC COLLECT METH UR: ABNORMAL
UROBILINOGEN UR STRIP-ACNC: >=8 EU/DL
WBC #/AREA VAG WET PREP: NORMAL
YEAST GENITAL QL WET PREP: NORMAL

## 2024-10-12 PROCEDURE — 81003 URINALYSIS AUTO W/O SCOPE: CPT | Performed by: OBSTETRICS & GYNECOLOGY

## 2024-10-12 PROCEDURE — 59025 FETAL NON-STRESS TEST: CPT

## 2024-10-12 PROCEDURE — 87210 SMEAR WET MOUNT SALINE/INK: CPT | Performed by: OBSTETRICS & GYNECOLOGY

## 2024-10-12 PROCEDURE — 99211 OFF/OP EST MAY X REQ PHY/QHP: CPT | Mod: 25

## 2024-10-12 RX ORDER — SODIUM CHLORIDE, SODIUM LACTATE, POTASSIUM CHLORIDE, CALCIUM CHLORIDE 600; 310; 30; 20 MG/100ML; MG/100ML; MG/100ML; MG/100ML
INJECTION, SOLUTION INTRAVENOUS CONTINUOUS
Status: DISCONTINUED | OUTPATIENT
Start: 2024-10-12 | End: 2024-10-12 | Stop reason: HOSPADM

## 2024-10-12 NOTE — NURSING
Pt presents with c/o vaginal pressure x 2 hours after exercise. Denies vb, lof and reports +FM. Ccua obtained. Assessment done.

## 2024-10-13 NOTE — NURSING
Pt stated feeling less vaginal pressure.Lab results reviewed with pt. Instructed to hydrate more. Discharge AVS given. NAD noted

## 2024-10-13 NOTE — NURSING
At bedside. Wet prep explained and collected. Specimen sent to pathology. Pt encouraged to po hydrate. Verbalized understanding.

## 2024-10-13 NOTE — NURSING
Dr Vanegas notified of pts arrival and c/o vaginal pressure. Wet prep order.If negative wet prep, discharge pt home.

## 2024-10-14 ENCOUNTER — ROUTINE PRENATAL (OUTPATIENT)
Dept: OBSTETRICS AND GYNECOLOGY | Facility: CLINIC | Age: 24
End: 2024-10-14
Payer: MEDICAID

## 2024-10-14 ENCOUNTER — LAB VISIT (OUTPATIENT)
Dept: LAB | Facility: HOSPITAL | Age: 24
End: 2024-10-14
Attending: OBSTETRICS & GYNECOLOGY
Payer: MEDICAID

## 2024-10-14 VITALS — BODY MASS INDEX: 33.64 KG/M2 | WEIGHT: 196 LBS | DIASTOLIC BLOOD PRESSURE: 82 MMHG | SYSTOLIC BLOOD PRESSURE: 120 MMHG

## 2024-10-14 DIAGNOSIS — Z87.59 HISTORY OF PREGNANCY INDUCED HYPERTENSION: ICD-10-CM

## 2024-10-14 DIAGNOSIS — Z87.51 HISTORY OF PRETERM LABOR: ICD-10-CM

## 2024-10-14 DIAGNOSIS — Z34.93 THIRD TRIMESTER PREGNANCY: ICD-10-CM

## 2024-10-14 DIAGNOSIS — Z3A.35 35 WEEKS GESTATION OF PREGNANCY: Primary | ICD-10-CM

## 2024-10-14 DIAGNOSIS — Z3A.35 35 WEEKS GESTATION OF PREGNANCY: ICD-10-CM

## 2024-10-14 LAB
BASOPHILS # BLD AUTO: 0.02 K/UL (ref 0–0.2)
BASOPHILS NFR BLD: 0.2 % (ref 0–1.9)
DIFFERENTIAL METHOD BLD: ABNORMAL
EOSINOPHIL # BLD AUTO: 0.1 K/UL (ref 0–0.5)
EOSINOPHIL NFR BLD: 0.6 % (ref 0–8)
ERYTHROCYTE [DISTWIDTH] IN BLOOD BY AUTOMATED COUNT: 13.2 % (ref 11.5–14.5)
ESTIMATED AVG GLUCOSE: 105 MG/DL (ref 68–131)
HBA1C MFR BLD: 5.3 % (ref 4–5.6)
HCT VFR BLD AUTO: 33.6 % (ref 37–48.5)
HGB BLD-MCNC: 10.1 G/DL (ref 12–16)
HIV 1+2 AB+HIV1 P24 AG SERPL QL IA: NORMAL
IMM GRANULOCYTES # BLD AUTO: 0.03 K/UL (ref 0–0.04)
IMM GRANULOCYTES NFR BLD AUTO: 0.3 % (ref 0–0.5)
LYMPHOCYTES # BLD AUTO: 2.5 K/UL (ref 1–4.8)
LYMPHOCYTES NFR BLD: 27.4 % (ref 18–48)
MCH RBC QN AUTO: 26 PG (ref 27–31)
MCHC RBC AUTO-ENTMCNC: 30.1 G/DL (ref 32–36)
MCV RBC AUTO: 87 FL (ref 82–98)
MONOCYTES # BLD AUTO: 0.7 K/UL (ref 0.3–1)
MONOCYTES NFR BLD: 7.6 % (ref 4–15)
NEUTROPHILS # BLD AUTO: 5.8 K/UL (ref 1.8–7.7)
NEUTROPHILS NFR BLD: 63.9 % (ref 38–73)
NRBC BLD-RTO: 0 /100 WBC
PLATELET # BLD AUTO: 224 K/UL (ref 150–450)
PMV BLD AUTO: 10.6 FL (ref 9.2–12.9)
RBC # BLD AUTO: 3.88 M/UL (ref 4–5.4)
TREPONEMA PALLIDUM IGG+IGM AB [PRESENCE] IN SERUM OR PLASMA BY IMMUNOASSAY: NONREACTIVE
WBC # BLD AUTO: 9.09 K/UL (ref 3.9–12.7)

## 2024-10-14 PROCEDURE — 36415 COLL VENOUS BLD VENIPUNCTURE: CPT | Performed by: OBSTETRICS & GYNECOLOGY

## 2024-10-14 PROCEDURE — 85025 COMPLETE CBC W/AUTO DIFF WBC: CPT | Performed by: OBSTETRICS & GYNECOLOGY

## 2024-10-14 PROCEDURE — 99213 OFFICE O/P EST LOW 20 MIN: CPT | Mod: PBBFAC,TH | Performed by: OBSTETRICS & GYNECOLOGY

## 2024-10-14 PROCEDURE — 83036 HEMOGLOBIN GLYCOSYLATED A1C: CPT | Performed by: OBSTETRICS & GYNECOLOGY

## 2024-10-14 PROCEDURE — 87653 STREP B DNA AMP PROBE: CPT | Performed by: OBSTETRICS & GYNECOLOGY

## 2024-10-14 PROCEDURE — 86593 SYPHILIS TEST NON-TREP QUANT: CPT | Performed by: OBSTETRICS & GYNECOLOGY

## 2024-10-14 PROCEDURE — 99999 PR PBB SHADOW E&M-EST. PATIENT-LVL III: CPT | Mod: PBBFAC,,, | Performed by: OBSTETRICS & GYNECOLOGY

## 2024-10-14 PROCEDURE — 99213 OFFICE O/P EST LOW 20 MIN: CPT | Mod: TH,S$PBB,, | Performed by: OBSTETRICS & GYNECOLOGY

## 2024-10-14 PROCEDURE — 87389 HIV-1 AG W/HIV-1&-2 AB AG IA: CPT | Performed by: OBSTETRICS & GYNECOLOGY

## 2024-10-14 NOTE — PROGRESS NOTES
35w3d  Patient comes in today for follow-up prenatal care  Has had some contractions.  No vaginal bleeding, contractions, or rupture of membranes.  Good fetal movements.    Went to our Labor and Delivery unit on 10/12/24 with contractions.  Discharged home.    Eating well without significant nausea/vomiting  Not gaining weight   Taking prenatal vitamins, iron supplement, and baby aspirin  Tdap 2024    Has not been doing Connected MOM.  Old equipment broke.  Maternal quad screen not done    Cervix is 1, 60%, -3, vertex     Discussed with patient MFM's findings and recommendations  Discussed history of  labor, delivery  Discussed history of pregnancy-induced hypertension  Discussed Connected MOM  OGTT soon  Discussed  labor and medications  Labor precautions  Fetal kick count  GBS, HIV/FTA-Abs and consents  Back next week    Vitals signs, FHTs, urine dip, and PE findings documented, reviewed and available in OB flow chart.   I spent a total of 20 minutes on the day of the visit. This includes face to face time and non-face to face time preparing to see the patient (eg, review of tests and charts), Obtaining and/or reviewing separately obtained history, Documenting clinical information in the electronic or other health record, Independently interpreting results and communicating results to the patient/family/caregiver, or Care coordination.

## 2024-10-16 ENCOUNTER — HOSPITAL ENCOUNTER (OUTPATIENT)
Facility: HOSPITAL | Age: 24
Discharge: HOME | End: 2024-10-16
Attending: OBSTETRICS & GYNECOLOGY | Admitting: OBSTETRICS & GYNECOLOGY
Payer: MEDICAID

## 2024-10-16 VITALS — SYSTOLIC BLOOD PRESSURE: 128 MMHG | DIASTOLIC BLOOD PRESSURE: 79 MMHG | HEART RATE: 83 BPM | OXYGEN SATURATION: 100 %

## 2024-10-16 DIAGNOSIS — Z34.90 PREGNANT: ICD-10-CM

## 2024-10-16 LAB
BACTERIA #/AREA URNS HPF: NORMAL /HPF
BILIRUB UR QL STRIP: NEGATIVE
CLARITY UR: CLEAR
COLOR UR: YELLOW
GLUCOSE UR QL STRIP: NEGATIVE
GROUP B STREPTOCOCCUS, PCR: NEGATIVE
HGB UR QL STRIP: NEGATIVE
KETONES UR QL STRIP: NEGATIVE
LEUKOCYTE ESTERASE UR QL STRIP: ABNORMAL
MICROSCOPIC COMMENT: NORMAL
NITRITE UR QL STRIP: NEGATIVE
PH UR STRIP: 7 [PH] (ref 5–8)
PROT UR QL STRIP: NEGATIVE
RBC #/AREA URNS HPF: 0 /HPF (ref 0–4)
SP GR UR STRIP: 1.01 (ref 1–1.03)
SQUAMOUS #/AREA URNS HPF: 5 /HPF
URN SPEC COLLECT METH UR: ABNORMAL
UROBILINOGEN UR STRIP-ACNC: ABNORMAL EU/DL
WBC #/AREA URNS HPF: 4 /HPF (ref 0–5)

## 2024-10-16 PROCEDURE — 81000 URINALYSIS NONAUTO W/SCOPE: CPT | Performed by: OBSTETRICS & GYNECOLOGY

## 2024-10-16 PROCEDURE — 59025 FETAL NON-STRESS TEST: CPT

## 2024-10-16 PROCEDURE — 99211 OFF/OP EST MAY X REQ PHY/QHP: CPT | Mod: 25

## 2024-10-16 NOTE — NURSING
Patient History  GA: 35w5d   GP:    Estimated Date of Delivery: 11/15/24   Antepartum complications:   Past Medical History:   Diagnosis Date    Asthma     Hypertension affecting pregnancy in third trimester 2021       OB History    Para Term  AB Living   4 3 1 2   3   SAB IAB Ectopic Multiple Live Births         0 3      # Outcome Date GA Lbr Nahid/2nd Weight Sex Type Anes PTL Lv   4 Current            3  22 35w2d  3.03 kg (6 lb 10.9 oz) M Vag-Spont EPI Y DOUG   2  21 34w1d 01:18 / 00:04 1.82 kg (4 lb 0.2 oz) F Vag-Spont EPI Y DOUG   1 Term 16 38w5d  3.738 kg (8 lb 3.9 oz) M Vag-Vacuum EPI N DOUG      VS: Pulse:  [] 98  SpO2:  [100 %] 100 %  BP: (130)/(80) 130/80   Complaints: pt presented with c/o waking up went to [facility] and started hurting, pt denies lof, vag. Bleeding or recent sex.         Number of past c-sections:   History of past pregnancy complications:   Pain level on admit:   Pain reassessment:   Fetal movement:   Uterine Activity   Contraction intensity:  Contraction Frequency (minutes):   Contractions per 10 minutes:  Contraction Duration (seconds):   Uterine tone: soft  FHR baseline: 150  Variability: moderate  Accels: present  Decels: none  SVE  Dilation: 1  Effacement:30  Station: -3  Name of second nurse verification of strip:

## 2024-10-16 NOTE — DISCHARGE INSTRUCTIONS
Home Undelivered Discharge Instructions    After Discharge Orders:    Future Appointments   Date Time Provider Department Center   10/22/2024 11:15 AM Mingo Santamaria MD [facility]   10/29/2024 11:15 AM Mingo Santamaria MD [facility]   2024 11:15 AM Mingo Santamaria MD [facility]   2024 11:15 AM [facility]       Call physician or midwife's office for instructions.    Current Discharge Medication List        CONTINUE these medications which have NOT CHANGED    Details   albuterol (PROVENTIL/VENTOLIN HFA) 90 mcg/actuation inhaler Inhale 1-2 puffs into the lungs every 6 (six) hours as needed for Wheezing. Rescue  Qty: 18 g, Refills: 0      aspirin (ECOTRIN) 81 MG EC tablet Take 1 tablet (81 mg total) by mouth once daily.  Qty: 150 tablet, Refills: 1    Associated Diagnoses: 13 weeks gestation of pregnancy; History of  labor      prenatal 114-iron a-g-folate 1 (PRENATE ELITE, IRON ASP GLYC,) 20 mg iron- 1 mg Tab Take 1 tablet by mouth once daily.  Qty: 30 tablet, Refills: 8    Comments: Please give patient medication covered by her insurance  Associated Diagnoses: Visit for confirmation of pregnancy test result with physical exam                     Diet:  normal diet as tolerated    Rest: normal activity as tolerated    Other instructions: Do kick counts once a day on your baby. Choose the time of day your baby is most active. Get in a comfortable lying or sitting position and time how long it takes to feel 10 kicks, twists, turns, swishes, or rolls. Call your physician or midwife if there have not been 10 kicks in 1 hours    Call physician or midwife, return to Labor and Delivery, call 911, or go to the nearest Emergency Room if: increased leakage or fluid, contractions more than  10 per  1 hour, decreased fetal movement, persistent low back pain or cramping, bleeding from vaginal area, difficulty urinating, pain with urination, difficulty  breathing, new calf pain, persistent headache, or vision change

## 2024-10-22 ENCOUNTER — ROUTINE PRENATAL (OUTPATIENT)
Dept: OBSTETRICS AND GYNECOLOGY | Facility: CLINIC | Age: 24
End: 2024-10-22
Payer: MEDICAID

## 2024-10-22 VITALS
DIASTOLIC BLOOD PRESSURE: 80 MMHG | SYSTOLIC BLOOD PRESSURE: 120 MMHG | BODY MASS INDEX: 33.94 KG/M2 | WEIGHT: 197.75 LBS

## 2024-10-22 DIAGNOSIS — Z34.93 THIRD TRIMESTER PREGNANCY: ICD-10-CM

## 2024-10-22 DIAGNOSIS — Z87.59 HISTORY OF PREGNANCY INDUCED HYPERTENSION: ICD-10-CM

## 2024-10-22 DIAGNOSIS — Z3A.36 36 WEEKS GESTATION OF PREGNANCY: Primary | ICD-10-CM

## 2024-10-22 DIAGNOSIS — Z87.51 HISTORY OF PRETERM LABOR: ICD-10-CM

## 2024-10-22 PROCEDURE — 99213 OFFICE O/P EST LOW 20 MIN: CPT | Mod: TH,S$PBB,, | Performed by: OBSTETRICS & GYNECOLOGY

## 2024-10-22 PROCEDURE — 99999 PR PBB SHADOW E&M-EST. PATIENT-LVL II: CPT | Mod: PBBFAC,,, | Performed by: OBSTETRICS & GYNECOLOGY

## 2024-10-22 PROCEDURE — 99212 OFFICE O/P EST SF 10 MIN: CPT | Mod: PBBFAC,TH | Performed by: OBSTETRICS & GYNECOLOGY

## 2024-10-22 NOTE — PROGRESS NOTES
36w4d  Patient comes in today for follow-up prenatal care  Has had some contractions.  No vaginal bleeding, contractions, or rupture of membranes.  Good fetal movements.    Went to our Labor and Delivery unit on 10/12/24 with contractions.  Discharged home.    Eating well without significant nausea/vomiting  Not gaining weight   Taking prenatal vitamins, iron supplement, and baby aspirin  Tdap 2024    Has not been doing Connected MOM.  Old equipment broke.  Maternal quad screen not done    Cervix is 2, 80%, -2, vertex     Discussed with patient MFM's findings and recommendations  Discussed history of  labor, delivery  Discussed history of pregnancy-induced hypertension  Discussed Connected MOM  Discussed  labor and medications  Labor precautions  Fetal kick count  Back next week    Vitals signs, FHTs, urine dip, and PE findings documented, reviewed and available in OB flow chart.   I spent a total of 20 minutes on the day of the visit. This includes face to face time and non-face to face time preparing to see the patient (eg, review of tests and charts), Obtaining and/or reviewing separately obtained history, Documenting clinical information in the electronic or other health record, Independently interpreting results and communicating results to the patient/family/caregiver, or Care coordination.

## 2024-10-28 ENCOUNTER — ROUTINE PRENATAL (OUTPATIENT)
Dept: OBSTETRICS AND GYNECOLOGY | Facility: CLINIC | Age: 24
End: 2024-10-28
Payer: MEDICAID

## 2024-10-28 VITALS
WEIGHT: 195.31 LBS | DIASTOLIC BLOOD PRESSURE: 70 MMHG | SYSTOLIC BLOOD PRESSURE: 120 MMHG | BODY MASS INDEX: 33.53 KG/M2

## 2024-10-28 DIAGNOSIS — Z3A.37 37 WEEKS GESTATION OF PREGNANCY: Primary | ICD-10-CM

## 2024-10-28 DIAGNOSIS — Z87.59 HISTORY OF PREGNANCY INDUCED HYPERTENSION: ICD-10-CM

## 2024-10-28 DIAGNOSIS — Z87.51 HISTORY OF PRETERM LABOR: ICD-10-CM

## 2024-10-28 DIAGNOSIS — Z34.93 THIRD TRIMESTER PREGNANCY: ICD-10-CM

## 2024-10-28 PROCEDURE — 99999 PR PBB SHADOW E&M-EST. PATIENT-LVL III: CPT | Mod: PBBFAC,,, | Performed by: OBSTETRICS & GYNECOLOGY

## 2024-10-28 PROCEDURE — 99213 OFFICE O/P EST LOW 20 MIN: CPT | Mod: PBBFAC,TH | Performed by: OBSTETRICS & GYNECOLOGY

## 2024-10-28 PROCEDURE — 99213 OFFICE O/P EST LOW 20 MIN: CPT | Mod: TH,S$PBB,, | Performed by: OBSTETRICS & GYNECOLOGY

## 2024-11-06 ENCOUNTER — HOSPITAL ENCOUNTER (OUTPATIENT)
Facility: HOSPITAL | Age: 24
Discharge: HOME | End: 2024-11-06
Attending: EMERGENCY MEDICINE | Admitting: OBSTETRICS & GYNECOLOGY

## 2024-11-06 VITALS
RESPIRATION RATE: 17 BRPM | TEMPERATURE: 98 F | OXYGEN SATURATION: 100 % | HEART RATE: 72 BPM | DIASTOLIC BLOOD PRESSURE: 76 MMHG | SYSTOLIC BLOOD PRESSURE: 120 MMHG

## 2024-11-06 DIAGNOSIS — O26.899 ABDOMINAL PAIN AFFECTING PREGNANCY: ICD-10-CM

## 2024-11-06 DIAGNOSIS — R10.9 ABDOMINAL PAIN AFFECTING PREGNANCY: ICD-10-CM

## 2024-11-06 LAB — RUPTURE OF MEMBRANE: NEGATIVE

## 2024-11-06 PROCEDURE — 84112 EVAL AMNIOTIC FLUID PROTEIN: CPT | Performed by: OBSTETRICS & GYNECOLOGY

## 2024-11-06 PROCEDURE — 25000003 PHARM REV CODE 250: Performed by: OBSTETRICS & GYNECOLOGY

## 2024-11-06 RX ORDER — ACETAMINOPHEN 325 MG/1
650 TABLET ORAL ONCE
Status: COMPLETED | OUTPATIENT
Start: 2024-11-06 | End: 2024-11-06

## 2024-11-06 RX ADMIN — ACETAMINOPHEN 650 MG: 325 TABLET ORAL at 10:11

## 2024-11-07 NOTE — DISCHARGE INSTRUCTIONS
Home Undelivered Discharge Instructions    After Discharge Orders:    Future Appointments   Date Time Provider Department Center   2024 11:15 AM Mingo Santamaria MD [facility]           Current Discharge Medication List        CONTINUE these medications which have NOT CHANGED    Details   albuterol (PROVENTIL/VENTOLIN HFA) 90 mcg/actuation inhaler Inhale 1-2 puffs into the lungs every 6 (six) hours as needed for Wheezing. Rescue  Qty: 18 g, Refills: 0      aspirin (ECOTRIN) 81 MG EC tablet Take 1 tablet (81 mg total) by mouth once daily.  Qty: 150 tablet, Refills: 1    Associated Diagnoses: 13 weeks gestation of pregnancy; History of  labor      prenatal 114-iron a-g-folate 1 (PRENATE ELITE, IRON ASP GLYC,) 20 mg iron- 1 mg Tab Take 1 tablet by mouth once daily.  Qty: 30 tablet, Refills: 8    Comments: Please give patient medication covered by her insurance  Associated Diagnoses: Visit for confirmation of pregnancy test result with physical exam                     Diet:  normal diet as tolerated    Rest: normal activity as tolerated    Other instructions: Do kick counts once a day on your baby. Choose the time of day your baby is most active. Get in a comfortable lying or sitting position and time how long it takes to feel 10 kicks, twists, turns, swishes, or rolls. Call your physician or midwife if there have not been 10 kicks in 1 hours    Call physician or midwife, return to Labor and Delivery, call 911, or go to the nearest Emergency Room if: increased leakage or fluid, decreased fetal movement, persistent low back pain or cramping, bleeding from vaginal area, difficulty urinating, pain with urination, difficulty breathing, new calf pain, persistent headache, or vision change.    Take Tylenol as needed for pain. Follow instructions on bottle.   Drink 8-10 glasses of water a day.

## 2024-11-07 NOTE — NURSING
"Pt complaining of 9/10 painful contractions starting "about an hour ago." Pt also reports noticing her underwear was wet right before coming to the hospital. Pt reports having intercourse today. Allergies, medications, and history reviewed. ROM + collected. SVE 2.5/80/-3. Plan of care reviewed with pt, verbalizes understanding. PO hydration given.  "

## 2024-11-07 NOTE — NURSING
Phoned Dr. Wallace. Notified of complaints, SVE, ctx pattern, FHTs, and negative ROM +. Orders to recheck pt in 1 hour. If still unchanged, can be discharged home.

## 2024-11-13 ENCOUNTER — ANESTHESIA (OUTPATIENT)
Dept: OBSTETRICS AND GYNECOLOGY | Facility: HOSPITAL | Age: 24
End: 2024-11-13
Payer: MEDICAID

## 2024-11-13 ENCOUNTER — ANESTHESIA EVENT (OUTPATIENT)
Dept: OBSTETRICS AND GYNECOLOGY | Facility: HOSPITAL | Age: 24
End: 2024-11-13
Payer: MEDICAID

## 2024-11-13 ENCOUNTER — HOSPITAL ENCOUNTER (INPATIENT)
Facility: HOSPITAL | Age: 24
LOS: 2 days | Discharge: HOME | End: 2024-11-15
Attending: OBSTETRICS & GYNECOLOGY | Admitting: OBSTETRICS & GYNECOLOGY
Payer: MEDICAID

## 2024-11-13 DIAGNOSIS — Z34.90 PREGNANCY: ICD-10-CM

## 2024-11-13 DIAGNOSIS — Z3A.39 39 WEEKS GESTATION OF PREGNANCY: ICD-10-CM

## 2024-11-13 LAB
ABO + RH BLD: NORMAL
ALBUMIN SERPL BCP-MCNC: 3 G/DL (ref 3.5–5.2)
ALP SERPL-CCNC: 128 U/L (ref 40–150)
ALT SERPL W/O P-5'-P-CCNC: 10 U/L (ref 10–44)
ANION GAP SERPL CALC-SCNC: 8 MMOL/L (ref 8–16)
AST SERPL-CCNC: 14 U/L (ref 10–40)
BASOPHILS # BLD AUTO: 0.02 K/UL (ref 0–0.2)
BASOPHILS NFR BLD: 0.1 % (ref 0–1.9)
BILIRUB SERPL-MCNC: 0.4 MG/DL (ref 0.1–1)
BLD GP AB SCN CELLS X3 SERPL QL: NORMAL
BUN SERPL-MCNC: 5 MG/DL (ref 6–20)
CALCIUM SERPL-MCNC: 9.3 MG/DL (ref 8.7–10.5)
CHLORIDE SERPL-SCNC: 106 MMOL/L (ref 95–110)
CO2 SERPL-SCNC: 23 MMOL/L (ref 23–29)
CREAT SERPL-MCNC: 0.6 MG/DL (ref 0.5–1.4)
DIFFERENTIAL METHOD BLD: ABNORMAL
EOSINOPHIL # BLD AUTO: 0 K/UL (ref 0–0.5)
EOSINOPHIL NFR BLD: 0.3 % (ref 0–8)
ERYTHROCYTE [DISTWIDTH] IN BLOOD BY AUTOMATED COUNT: 14 % (ref 11.5–14.5)
EST. GFR  (NO RACE VARIABLE): >60 ML/MIN/1.73 M^2
GLUCOSE SERPL-MCNC: 72 MG/DL (ref 70–110)
HCT VFR BLD AUTO: 37 % (ref 37–48.5)
HGB BLD-MCNC: 11.1 G/DL (ref 12–16)
IMM GRANULOCYTES # BLD AUTO: 0.06 K/UL (ref 0–0.04)
IMM GRANULOCYTES NFR BLD AUTO: 0.4 % (ref 0–0.5)
LYMPHOCYTES # BLD AUTO: 3.1 K/UL (ref 1–4.8)
LYMPHOCYTES NFR BLD: 23 % (ref 18–48)
MCH RBC QN AUTO: 25.2 PG (ref 27–31)
MCHC RBC AUTO-ENTMCNC: 30 G/DL (ref 32–36)
MCV RBC AUTO: 84 FL (ref 82–98)
MONOCYTES # BLD AUTO: 0.9 K/UL (ref 0.3–1)
MONOCYTES NFR BLD: 6.5 % (ref 4–15)
NEUTROPHILS # BLD AUTO: 9.3 K/UL (ref 1.8–7.7)
NEUTROPHILS NFR BLD: 69.7 % (ref 38–73)
NRBC BLD-RTO: 0 /100 WBC
PLATELET # BLD AUTO: 253 K/UL (ref 150–450)
PMV BLD AUTO: 11.9 FL (ref 9.2–12.9)
POTASSIUM SERPL-SCNC: 3.8 MMOL/L (ref 3.5–5.1)
PROT SERPL-MCNC: 7.9 G/DL (ref 6–8.4)
RBC # BLD AUTO: 4.41 M/UL (ref 4–5.4)
SODIUM SERPL-SCNC: 137 MMOL/L (ref 136–145)
TREPONEMA PALLIDUM IGG+IGM AB [PRESENCE] IN SERUM OR PLASMA BY IMMUNOASSAY: NONREACTIVE
WBC # BLD AUTO: 13.34 K/UL (ref 3.9–12.7)

## 2024-11-13 PROCEDURE — 72100002 HC LABOR CARE, 1ST 8 HOURS

## 2024-11-13 PROCEDURE — 0HQ9XZZ REPAIR PERINEUM SKIN, EXTERNAL APPROACH: ICD-10-PCS | Performed by: OBSTETRICS & GYNECOLOGY

## 2024-11-13 PROCEDURE — 99211 OFF/OP EST MAY X REQ PHY/QHP: CPT | Mod: 25

## 2024-11-13 PROCEDURE — 86850 RBC ANTIBODY SCREEN: CPT | Performed by: OBSTETRICS & GYNECOLOGY

## 2024-11-13 PROCEDURE — 25000003 PHARM REV CODE 250: Performed by: OBSTETRICS & GYNECOLOGY

## 2024-11-13 PROCEDURE — 80053 COMPREHEN METABOLIC PANEL: CPT | Performed by: OBSTETRICS & GYNECOLOGY

## 2024-11-13 PROCEDURE — 0UQMXZZ REPAIR VULVA, EXTERNAL APPROACH: ICD-10-PCS | Performed by: OBSTETRICS & GYNECOLOGY

## 2024-11-13 PROCEDURE — 51702 INSERT TEMP BLADDER CATH: CPT

## 2024-11-13 PROCEDURE — 10907ZC DRAINAGE OF AMNIOTIC FLUID, THERAPEUTIC FROM PRODUCTS OF CONCEPTION, VIA NATURAL OR ARTIFICIAL OPENING: ICD-10-PCS | Performed by: OBSTETRICS & GYNECOLOGY

## 2024-11-13 PROCEDURE — 59025 FETAL NON-STRESS TEST: CPT

## 2024-11-13 PROCEDURE — 86593 SYPHILIS TEST NON-TREP QUANT: CPT | Performed by: OBSTETRICS & GYNECOLOGY

## 2024-11-13 PROCEDURE — 11000001 HC ACUTE MED/SURG PRIVATE ROOM

## 2024-11-13 PROCEDURE — 63600175 PHARM REV CODE 636 W HCPCS: Performed by: OBSTETRICS & GYNECOLOGY

## 2024-11-13 PROCEDURE — 59409 OBSTETRICAL CARE: CPT | Mod: GB,,, | Performed by: OBSTETRICS & GYNECOLOGY

## 2024-11-13 PROCEDURE — 62326 NJX INTERLAMINAR LMBR/SAC: CPT | Performed by: ANESTHESIOLOGY

## 2024-11-13 PROCEDURE — 25000003 PHARM REV CODE 250: Performed by: ANESTHESIOLOGY

## 2024-11-13 PROCEDURE — 85025 COMPLETE CBC W/AUTO DIFF WBC: CPT | Performed by: OBSTETRICS & GYNECOLOGY

## 2024-11-13 PROCEDURE — 63600175 PHARM REV CODE 636 W HCPCS: Performed by: ANESTHESIOLOGY

## 2024-11-13 PROCEDURE — 27200710 HC EPIDURAL INFUSION PUMP SET: Performed by: ANESTHESIOLOGY

## 2024-11-13 PROCEDURE — C1751 CATH, INF, PER/CENT/MIDLINE: HCPCS | Performed by: ANESTHESIOLOGY

## 2024-11-13 PROCEDURE — 72200005 HC VAGINAL DELIVERY LEVEL II

## 2024-11-13 RX ORDER — HYDROCORTISONE 25 MG/G
CREAM TOPICAL 3 TIMES DAILY PRN
Status: DISCONTINUED | OUTPATIENT
Start: 2024-11-13 | End: 2024-11-15 | Stop reason: HOSPADM

## 2024-11-13 RX ORDER — MUPIROCIN 20 MG/G
OINTMENT TOPICAL
Status: CANCELLED | OUTPATIENT
Start: 2024-11-13

## 2024-11-13 RX ORDER — ONDANSETRON 8 MG/1
8 TABLET, ORALLY DISINTEGRATING ORAL EVERY 8 HOURS PRN
Status: CANCELLED | OUTPATIENT
Start: 2024-11-13

## 2024-11-13 RX ORDER — ACETAMINOPHEN 325 MG/1
650 TABLET ORAL EVERY 6 HOURS SCHEDULED
Status: DISCONTINUED | OUTPATIENT
Start: 2024-11-13 | End: 2024-11-15 | Stop reason: HOSPADM

## 2024-11-13 RX ORDER — SODIUM CHLORIDE, SODIUM LACTATE, POTASSIUM CHLORIDE, CALCIUM CHLORIDE 600; 310; 30; 20 MG/100ML; MG/100ML; MG/100ML; MG/100ML
INJECTION, SOLUTION INTRAVENOUS CONTINUOUS
Status: CANCELLED | OUTPATIENT
Start: 2024-11-13

## 2024-11-13 RX ORDER — METHYLERGONOVINE MALEATE 0.2 MG/ML
200 INJECTION INTRAVENOUS ONCE AS NEEDED
Status: DISCONTINUED | OUTPATIENT
Start: 2024-11-13 | End: 2024-11-15 | Stop reason: HOSPADM

## 2024-11-13 RX ORDER — IBUPROFEN 600 MG/1
600 TABLET ORAL EVERY 6 HOURS
Status: DISCONTINUED | OUTPATIENT
Start: 2024-11-13 | End: 2024-11-15 | Stop reason: HOSPADM

## 2024-11-13 RX ORDER — MISOPROSTOL 200 UG/1
800 TABLET ORAL ONCE AS NEEDED
Status: CANCELLED | OUTPATIENT
Start: 2024-11-13

## 2024-11-13 RX ORDER — TRANEXAMIC ACID 10 MG/ML
1000 INJECTION, SOLUTION INTRAVENOUS EVERY 30 MIN PRN
Status: DISCONTINUED | OUTPATIENT
Start: 2024-11-13 | End: 2024-11-15 | Stop reason: HOSPADM

## 2024-11-13 RX ORDER — OXYTOCIN 10 [USP'U]/ML
10 INJECTION, SOLUTION INTRAMUSCULAR; INTRAVENOUS ONCE AS NEEDED
Status: CANCELLED | OUTPATIENT
Start: 2024-11-13 | End: 2036-04-11

## 2024-11-13 RX ORDER — CARBOPROST TROMETHAMINE 250 UG/ML
250 INJECTION, SOLUTION INTRAMUSCULAR
Status: DISCONTINUED | OUTPATIENT
Start: 2024-11-13 | End: 2024-11-15 | Stop reason: HOSPADM

## 2024-11-13 RX ORDER — SIMETHICONE 80 MG
1 TABLET,CHEWABLE ORAL 4 TIMES DAILY PRN
Status: CANCELLED | OUTPATIENT
Start: 2024-11-13

## 2024-11-13 RX ORDER — CARBOPROST TROMETHAMINE 250 UG/ML
250 INJECTION, SOLUTION INTRAMUSCULAR
Status: CANCELLED | OUTPATIENT
Start: 2024-11-13

## 2024-11-13 RX ORDER — DIPHENOXYLATE HYDROCHLORIDE AND ATROPINE SULFATE 2.5; .025 MG/1; MG/1
2 TABLET ORAL EVERY 6 HOURS PRN
Status: CANCELLED | OUTPATIENT
Start: 2024-11-13

## 2024-11-13 RX ORDER — SODIUM CHLORIDE 9 MG/ML
INJECTION, SOLUTION INTRAVENOUS
Status: CANCELLED | OUTPATIENT
Start: 2024-11-13

## 2024-11-13 RX ORDER — AMOXICILLIN 250 MG
1 CAPSULE ORAL NIGHTLY
Status: DISCONTINUED | OUTPATIENT
Start: 2024-11-13 | End: 2024-11-15 | Stop reason: HOSPADM

## 2024-11-13 RX ORDER — OXYTOCIN-SODIUM CHLORIDE 0.9% IV SOLN 30 UNIT/500ML 30-0.9/5 UT/ML-%
95 SOLUTION INTRAVENOUS CONTINUOUS PRN
Status: CANCELLED | OUTPATIENT
Start: 2024-11-13

## 2024-11-13 RX ORDER — OXYTOCIN-SODIUM CHLORIDE 0.9% IV SOLN 30 UNIT/500ML 30-0.9/5 UT/ML-%
95 SOLUTION INTRAVENOUS CONTINUOUS PRN
Status: DISCONTINUED | OUTPATIENT
Start: 2024-11-13 | End: 2024-11-15

## 2024-11-13 RX ORDER — OXYTOCIN-SODIUM CHLORIDE 0.9% IV SOLN 30 UNIT/500ML 30-0.9/5 UT/ML-%
95 SOLUTION INTRAVENOUS ONCE AS NEEDED
Status: DISCONTINUED | OUTPATIENT
Start: 2024-11-13 | End: 2024-11-15

## 2024-11-13 RX ORDER — CALCIUM CARBONATE 200(500)MG
500 TABLET,CHEWABLE ORAL 3 TIMES DAILY PRN
Status: CANCELLED | OUTPATIENT
Start: 2024-11-13

## 2024-11-13 RX ORDER — SIMETHICONE 80 MG
1 TABLET,CHEWABLE ORAL EVERY 6 HOURS PRN
Status: DISCONTINUED | OUTPATIENT
Start: 2024-11-13 | End: 2024-11-15 | Stop reason: HOSPADM

## 2024-11-13 RX ORDER — ONDANSETRON 8 MG/1
8 TABLET, ORALLY DISINTEGRATING ORAL EVERY 8 HOURS PRN
Status: DISCONTINUED | OUTPATIENT
Start: 2024-11-13 | End: 2024-11-15 | Stop reason: HOSPADM

## 2024-11-13 RX ORDER — PROMETHAZINE HYDROCHLORIDE 25 MG/1
25 TABLET ORAL EVERY 6 HOURS PRN
Status: DISCONTINUED | OUTPATIENT
Start: 2024-11-13 | End: 2024-11-15 | Stop reason: HOSPADM

## 2024-11-13 RX ORDER — METHYLERGONOVINE MALEATE 0.2 MG/ML
200 INJECTION INTRAVENOUS ONCE AS NEEDED
Status: CANCELLED | OUTPATIENT
Start: 2024-11-13 | End: 2036-04-11

## 2024-11-13 RX ORDER — SODIUM CHLORIDE, SODIUM LACTATE, POTASSIUM CHLORIDE, CALCIUM CHLORIDE 600; 310; 30; 20 MG/100ML; MG/100ML; MG/100ML; MG/100ML
INJECTION, SOLUTION INTRAVENOUS CONTINUOUS
Status: DISCONTINUED | OUTPATIENT
Start: 2024-11-13 | End: 2024-11-13

## 2024-11-13 RX ORDER — LIDOCAINE HYDROCHLORIDE AND EPINEPHRINE 15; 5 MG/ML; UG/ML
INJECTION, SOLUTION EPIDURAL
Status: DISCONTINUED | OUTPATIENT
Start: 2024-11-13 | End: 2024-11-13

## 2024-11-13 RX ORDER — ACETAMINOPHEN 325 MG/1
650 TABLET ORAL EVERY 6 HOURS SCHEDULED
Status: DISCONTINUED | OUTPATIENT
Start: 2024-11-13 | End: 2024-11-13

## 2024-11-13 RX ORDER — OXYTOCIN-SODIUM CHLORIDE 0.9% IV SOLN 30 UNIT/500ML 30-0.9/5 UT/ML-%
10 SOLUTION INTRAVENOUS ONCE AS NEEDED
Status: CANCELLED | OUTPATIENT
Start: 2024-11-13 | End: 2036-04-11

## 2024-11-13 RX ORDER — OXYCODONE AND ACETAMINOPHEN 5; 325 MG/1; MG/1
1 TABLET ORAL EVERY 4 HOURS PRN
Status: DISCONTINUED | OUTPATIENT
Start: 2024-11-13 | End: 2024-11-15 | Stop reason: HOSPADM

## 2024-11-13 RX ORDER — MISOPROSTOL 200 UG/1
800 TABLET ORAL ONCE AS NEEDED
Status: DISCONTINUED | OUTPATIENT
Start: 2024-11-13 | End: 2024-11-15 | Stop reason: HOSPADM

## 2024-11-13 RX ORDER — LIDOCAINE HYDROCHLORIDE 10 MG/ML
10 INJECTION, SOLUTION INFILTRATION; PERINEURAL ONCE AS NEEDED
Status: CANCELLED | OUTPATIENT
Start: 2024-11-13 | End: 2036-04-11

## 2024-11-13 RX ORDER — DIPHENHYDRAMINE HCL 25 MG
25 CAPSULE ORAL EVERY 4 HOURS PRN
Status: DISCONTINUED | OUTPATIENT
Start: 2024-11-13 | End: 2024-11-15 | Stop reason: HOSPADM

## 2024-11-13 RX ORDER — TRANEXAMIC ACID 10 MG/ML
1000 INJECTION, SOLUTION INTRAVENOUS EVERY 30 MIN PRN
Status: CANCELLED | OUTPATIENT
Start: 2024-11-13

## 2024-11-13 RX ORDER — FENTANYL/BUPIVACAINE/NS/PF 2MCG/ML-.1
PLASTIC BAG, INJECTION (ML) INJECTION CONTINUOUS PRN
Status: DISCONTINUED | OUTPATIENT
Start: 2024-11-13 | End: 2024-11-13

## 2024-11-13 RX ORDER — DIPHENOXYLATE HYDROCHLORIDE AND ATROPINE SULFATE 2.5; .025 MG/1; MG/1
2 TABLET ORAL EVERY 6 HOURS PRN
Status: DISCONTINUED | OUTPATIENT
Start: 2024-11-13 | End: 2024-11-15 | Stop reason: HOSPADM

## 2024-11-13 RX ORDER — BUPIVACAINE HYDROCHLORIDE 2.5 MG/ML
INJECTION, SOLUTION EPIDURAL; INFILTRATION; INTRACAUDAL
Status: DISCONTINUED | OUTPATIENT
Start: 2024-11-13 | End: 2024-11-13

## 2024-11-13 RX ORDER — MISOPROSTOL 200 UG/1
800 TABLET ORAL ONCE AS NEEDED
Status: CANCELLED | OUTPATIENT
Start: 2024-11-13 | End: 2036-04-11

## 2024-11-13 RX ORDER — OXYTOCIN 10 [USP'U]/ML
10 INJECTION, SOLUTION INTRAMUSCULAR; INTRAVENOUS ONCE AS NEEDED
Status: DISCONTINUED | OUTPATIENT
Start: 2024-11-13 | End: 2024-11-15 | Stop reason: HOSPADM

## 2024-11-13 RX ORDER — IBUPROFEN 600 MG/1
600 TABLET ORAL EVERY 6 HOURS
Status: DISCONTINUED | OUTPATIENT
Start: 2024-11-13 | End: 2024-11-13

## 2024-11-13 RX ORDER — OXYTOCIN-SODIUM CHLORIDE 0.9% IV SOLN 30 UNIT/500ML 30-0.9/5 UT/ML-%
0-32 SOLUTION INTRAVENOUS CONTINUOUS
Status: DISCONTINUED | OUTPATIENT
Start: 2024-11-13 | End: 2024-11-13

## 2024-11-13 RX ADMIN — SODIUM CHLORIDE, POTASSIUM CHLORIDE, SODIUM LACTATE AND CALCIUM CHLORIDE: 600; 310; 30; 20 INJECTION, SOLUTION INTRAVENOUS at 12:11

## 2024-11-13 RX ADMIN — SENNOSIDES AND DOCUSATE SODIUM 1 TABLET: 50; 8.6 TABLET ORAL at 08:11

## 2024-11-13 RX ADMIN — BUPIVACAINE HYDROCHLORIDE 3 ML: 2.5 INJECTION, SOLUTION EPIDURAL; INFILTRATION; INTRACAUDAL; PERINEURAL at 11:11

## 2024-11-13 RX ADMIN — IBUPROFEN 600 MG: 600 TABLET, FILM COATED ORAL at 03:11

## 2024-11-13 RX ADMIN — Medication 11 ML/HR: at 11:11

## 2024-11-13 RX ADMIN — LIDOCAINE HYDROCHLORIDE,EPINEPHRINE BITARTRATE 3 ML: 15; .005 INJECTION, SOLUTION EPIDURAL; INFILTRATION; INTRACAUDAL; PERINEURAL at 11:11

## 2024-11-13 RX ADMIN — ACETAMINOPHEN 650 MG: 325 TABLET ORAL at 03:11

## 2024-11-13 RX ADMIN — Medication 2 MILLI-UNITS/MIN: at 12:11

## 2024-11-13 RX ADMIN — OXYCODONE HYDROCHLORIDE AND ACETAMINOPHEN 1 TABLET: 5; 325 TABLET ORAL at 05:11

## 2024-11-13 RX ADMIN — OXYCODONE HYDROCHLORIDE AND ACETAMINOPHEN 1 TABLET: 5; 325 TABLET ORAL at 10:11

## 2024-11-13 NOTE — H&P
[facility] - Labor & Delivery  Obstetrics  History & Physical    Patient Name: Fiona Le  MRN: [id number]  Admission Date: 2024  Primary Care Provider: Charles Hein MD    Subjective:     Principal Problem:39 weeks gestation of pregnancy    History of Present Illness:  23 yo  at 39w5  Admitted in active labor with regular contractions and cervical change    Obstetric HPI:  Patient reports regular and strong contractions, active fetal movement, No vaginal bleeding , No loss of fluid     This pregnancy has been complicated by   History of PIH and  deliveries    OB History    Para Term  AB Living   4 3 1 2 0 3   SAB IAB Ectopic Multiple Live Births   0 0 0 0 3      # Outcome Date GA Lbr Nahid/2nd Weight Sex Type Anes PTL Lv   4 Current            3  22 35w2d  3.03 kg (6 lb 10.9 oz) M Vag-Spont EPI Y DOUG      Name: EDWIGE LE      Apgar1: 9  Apgar5: 9   2  21 34w1d 01:18 / 00:04 1.82 kg (4 lb 0.2 oz) F Vag-Spont EPI Y DOUG      Name: HUAN LE      Apgar1: 9  Apgar5: 9   1 Term 16 38w5d  3.738 kg (8 lb 3.9 oz) M Vag-Vacuum EPI N DOUG      Apgar1: 7  Apgar5: 9     Past Medical History:   Diagnosis Date    Asthma     Hypertension affecting pregnancy in third trimester 2021     No past surgical history on file.    PTA Medications   Medication Sig    albuterol (PROVENTIL/VENTOLIN HFA) 90 mcg/actuation inhaler Inhale 1-2 puffs into the lungs every 6 (six) hours as needed for Wheezing. Rescue    aspirin (ECOTRIN) 81 MG EC tablet Take 1 tablet (81 mg total) by mouth once daily.    prenatal 114-iron a-g-folate 1 (PRENATE ELITE, IRON ASP GLYC,) 20 mg iron- 1 mg Tab Take 1 tablet by mouth once daily.       Review of patient's allergies indicates:   Allergen Reactions    Fish containing products Rash        Family History       Problem Relation (Age of Onset)    Diabetes Maternal Grandfather    Hypertension Mother, Maternal Grandmother     Miscarriages / Stillbirths Mother    Seizures Brother    Stroke Maternal Grandfather          Tobacco Use    Smoking status: Never    Smokeless tobacco: Never   Substance and Sexual Activity    Alcohol use: Not Currently    Drug use: Yes     Types: Marijuana    Sexual activity: Yes     Partners: Male     Birth control/protection: None     Review of Systems   Objective:     Vital Signs (Most Recent):  Pulse: 79 (24 0946)  BP: 121/71 (24 0918)  SpO2: 100 % (24 0943) Vital Signs (24h Range):  Pulse:  [79-96] 79  SpO2:  [98 %-100 %] 100 %  BP: (121)/(71) 121/71     Weight: 88.6 kg (195 lb 5.2 oz)  Body mass index is 33.53 kg/m².    FHT: 140 Cat 1 (reassuring )  TOCO:  Q 3-5 minutes     Physical Exam     Cervix:  Dilation:  6  Effacement:  75%  Station: -1  Presentation: Vertex     Significant Labs:  Lab Results   Component Value Date    GROUPTRH B POS 2024    HEPBSAG Non-reactive 2024    STREPBCULT No Group B Streptococcus isolated 2015       CBC:   Recent Labs   Lab 24  1101   WBC 13.34*   RBC 4.41   HGB 11.1*   HCT 37.0      MCV 84   MCH 25.2*   MCHC 30.0*     I have personallly reviewed all pertinent lab results from the last 24 hours.  Assessment/Plan:     24 y.o. female  at 39w5d for:    * 39 weeks gestation of pregnancy  Admit for delivery  Epidural per request  AROM once safe  Expect  soon    History of pregnancy induced hypertension  Normotensive now  Will monitor        Mingo Santamaria MD  Obstetrics  [facility] - Labor & Delivery

## 2024-11-13 NOTE — DISCHARGE INSTRUCTIONS
After a Vaginal Birth    General Discharge Instructions    May follow a regular diet, unless otherwise discussed with physician.  Take showers, not baths unless otherwise discussed with physician.  Activity as tolerated.  No lifting or heavy exercise for 6 weeks, no driving for 1 week, no sexual intercourse, douching or tampons for 6 weeks  May return to work/school as discussed with physician  Take medications as directed  Discuss birth control with physician  Breast care support bra worn at all times  Lactation consultant referral number ( [phone] or [phone])    Call Your Healthcare Provider Right Away If You Have:  A temperature of 100.4°F or higher.  If your blood pressure is over 140/90.  You have difficulty catching your breath or trouble breathing.  Heavy vaginal bleeding, clots, or vaginal discharge with foul odor. (heavier than menses)  Persistent nausea or vomiting.  You gain more than 3 pounds in 3 days.  Severe headaches not relieved by Tylenol (acetaminophen) or Motrin (ibuprofen)  Blurry or double vision, see spots or flashing lights.  Dizziness or fainting.  New onset swelling or worsening of existing swelling.  Burning or pain when you urinate.  No bowel movement for 5 days.  Redness, warmth, swelling, or pain in the lower leg.  Redness, discharge, or pain worse than you had in the hospital.  Burning, pain, red streaks, or lumpy areas in your breasts.  Cracks, blisters, or blood on your nipples.  Feelings of extreme sadness or anxiety, or a feeling that you dont want to be with your baby.  If you have any new or unusual symptoms or have questions or concerns    Some of these symptoms can occur up to 4 to 6 weeks after delivery. This can be a sign of pre-eclampsia, which is a serious disease that can cause stroke, seizures, organ damage, or death. Do not wait to call your doctor or seek medical attention.    If You Had Stitches  You may have received stitches in the skin near your vagina.  The stitches might have closed an episiotomy (an incision that enlarges the opening of the vagina). Or you may have needed stitches to repair torn skin. Either way, your stitches should dissolve within weeks. Until then, you can help reduce discomfort, aid healing, and reduce your risk of infection by keeping the stitches clean. These tips can help:    Gently wipe from front to back after you urinate or have a bowel movement.  After wiping, spray warm water on the area. Or you can have a sitz bath. This means sitting in a tub with a few inches of water in it. Then pat the area dry or use a hairdryer on a cool setting.  You can take a shower instead of a bath.  Change sanitary pads at least every 2-4 hours.  Place cold or heat packs on the area as directed by your doctors or nurses. Keep a thin towel between the pack and your skin.  Sit on firm seats so the stitches pull less.     Follow-Up  Schedule a  follow-up exam with your healthcare provider for about 6 weeks after delivery. During this exam, your uterus and vaginal area will be checked. Contact your healthcare provider if you think you or your baby are having any problems.                          Breastfeeding discharge instructions given with First Alert form and reviewed. Please complete First Alert within 3-5 days after the baby's birth. ( Please call the Breastfeeding Warmline ( [phone]) or the baby's pediatrician if you have any concerns.     Also discussed:  AAP recommendation of exclusive breastfeeding for the first 6 months of life and continued breastfeeding with the introduction of supplemental foods beyond the first year of life. The AAP recommends breastfeeding for for at least a year or longer. Instructed on the recommendation to delay all bottle and pacifier use until after 4 weeks of age and breastfeeding is well established.  Discussed the benefits of exclusive breastfeeding for both mother and baby.  Discussed the risks  of supplementation/pacifier use on the exclusivity of breastfeeding in the first 6 months. Feed the baby at the earliest sign of hunger or comfort  Hands to mouth, sucking motions  Rooting or searching for something to suck on  Dont wait for crying - it is a not a late sign of hunger; it is a sign of distress    The feedings may be 8-12 times per 24hrs and will not follow a schedule  Alternate the breast you start the feeding with, or start with the breast that feels the fullest  Switch breasts when the baby takes himself off the breast or falls asleep  Keep offering breasts until the baby looks full, no longer gives hunger signs, and stays asleep when placed on his back in the crib  If the baby is sleepy and wont wake for a feeding, put the baby skin-to-skin dressed in a diaper against the mothers bare chest  Sleep near your baby  The baby should be positioned and latched on to the breast correctly  Chest-to-chest, chin in the breast  Babys lips are flipped outward  Babys mouth is stretched open wide like a shout  Babys sucking should feel like tugging to the mother  The baby should be drinking at the breast:  You should hear swallowing or gulping throughout the feeding  You should see milk on the babys lips when he comes off the breast  Your breasts should be softer when the baby is finished feeding  The baby should look relaxed at the end of feedings  After the 4th day and your milk is in:  The babys poop should turn bright yellow and be loose, watery, and seedy  The baby should have at least 3-4 poops the size of the palm of your hand per day  The baby should have at least 6-8 wet diapers per day  The urine should be light yellow in color  You should drink when you are thirsty and eat a healthy diet when you are    hungry.     Take naps to get the rest you need.   Take medications and/or drink alcohol only with permission of your obstetrician    or the babys pediatrician.  You can also call the  [facility],   ([phone]), Monday-Friday, 8am-5pm Central time, to get the most   up-to-date evidence-based information on the use of medications during   pregnancy and breastfeeding.      The baby should be examined by a pediatrician at 3-5 days of age and again at 2 weeks of age unless ordered sooner by the pediatrician.  Once your milk production increases the baby should gain at least 1/2-1oz each day and should be back to birth weight no later than 10-14 days of age.If this is not the case, please call the Breastfeeding Warmline ( [phone]) for assistance and support.    Instructed on primary engorgement and precautions.  Discussed:    Typical timing of the onset of engorgement  Signs and symptoms of engorgement  If the milk is flowing, use wet or dry heat applied to the breasts for approximately 10min prior to each feeding as a comfort measure to facilitate the milk ejection reflex    Follow heat treatment with breast massage to soften hard/lumpy areas of the breast    Use unrestricted, frequent, effective feedings    Wake baby to feed if necessary    Avoid pacifier and bottle feedings    Hand express or pump breasts to the point of comfort as needed    Use cold treatments in the form of ice packs/gel packs/ frozen vegetables wrapped in a soft thin cloth and applied to the breasts for approximately 20min after each feeding until engorgement is resolved    Wear comfortable, supportive bra    Take pain medicine as needed    Use anti-inflammatory medications if prescribed by physician    Preparation and Hygiene:  Shower daily.  Wear a clean bra each day and wash daily in warm soapy water.  Change wet or moist breast pads frequently.  Moist pads can promote growth of germs.  Actively wash your hands, paying close attention to the area around and under your fingernails, thoroughly with soap and water for 15 seconds before pumping or handling your milk.  Re-wash your hands if you touch anything  (scratching your nose, answering the phone, etc) while pumping or handling your milk.   Before pumping your breasts, assemble the pump collection kit and have ready the sterile container and labels.  Place these items on a clean surface next to the breast pump.  Each time after you have finished pumping, take apart all of the parts of the breast pump collection kit and place them in a separate cleaning container (do not place them in the sink).  Be sure to remove the yellow valve from the breast shield and separate the white membrane from the yellow valve.  Rinse all of these parts with cool water.  Then use a new sponge and/or bottle brush and dishwashing detergent to clean the parts.  Rinse off the soapy water with cool water and air dry on a clean towel covered with a clean cloth.  All parts may also be washed after each use in the top rack of a .  Once each day, sanitize all of the parts of the breast pump collection kit.  This can be done by boiling the kit parts for 10 minutes or by using a Quick Clean Micro-Steam Bag made by [employer]  If condensation appears in the tubing, continue to run the pump with the tubing attached for 1-2 minutes or until the tubing is dry.   Notify your babys nurse or doctor if you become ill or need to take any medication, even over-the-counter medicines.  Collection and Storage of Expressed Breast milk:         1. Pump your breasts at least 8 or more times every 24 hours.  Double pump (both breasts at the same time) for at least 15-20 minutes using the most suction that is comfortable.    2. Write the date and time of pumping and the name of any medications you are taking on the babys pre-printed hospital identification label.   3.    Do not touch the inside of the storage containers or lids.  4.        Tightly screw the lid onto the container and place immediately into the refrigerator for daily use.  5.    Expressed breast milk should be refrigerated or frozen  within 4 hours of pumping.  6.        Do not store expressed breast milk on the door of your refrigerator or freezer             where the temperature is warmer.   7.        Refrigerated milk may be stored for up to 7 days.  At this point it can be moved to the freezer for 6 -12 months.  8.        Thaw frozen breast milk in overnight in the refrigerator.  Once milk is thawed it must be used within 24 hours.  9.        Refrigerated breast milk needs to be warmed to room temperature.  Warm by leaving unrefrigerated until it reached room temperature, or place sealed bottle into a cup of warm (not boiling) water or use a bottle warmer.               Never warm breast milk in a microwave or boiling water.    For any questions or concerns call the Lactation Department at [phone]

## 2024-11-13 NOTE — ANESTHESIA PREPROCEDURE EVALUATION
11/13/2024    Pre-operative evaluation for * No surgery found *    Fiona Le is a 24 y.o. female     Patient Active Problem List   Diagnosis    History of pregnancy induced hypertension       Review of patient's allergies indicates:   Allergen Reactions    Fish containing products Rash       No current facility-administered medications on file prior to encounter.     Current Outpatient Medications on File Prior to Encounter   Medication Sig Dispense Refill    albuterol (PROVENTIL/VENTOLIN HFA) 90 mcg/actuation inhaler Inhale 1-2 puffs into the lungs every 6 (six) hours as needed for Wheezing. Rescue 18 g 0    aspirin (ECOTRIN) 81 MG EC tablet Take 1 tablet (81 mg total) by mouth once daily. 150 tablet 1    prenatal 114-iron a-g-folate 1 (PRENATE ELITE, IRON ASP GLYC,) 20 mg iron- 1 mg Tab Take 1 tablet by mouth once daily. 30 tablet 8       No past surgical history on file.    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     I have reviewed the Nursing Notes. I have reviewed the NPO Status.   I have reviewed the Medications.     Review of Systems  Anesthesia Hx:  No problems with previous Anesthesia             Denies Family Hx of Anesthesia complications.    Denies Personal Hx  of Anesthesia complications.                    Social:  Non-Smoker, Recreational Drugs marijuana      Hematology/Oncology:  Hematology Normal   Oncology Normal                                   EENT/Dental:  EENT/Dental Normal           Cardiovascular:     Hypertension                                          Pulmonary:    Asthma                    Renal/:  Renal/ Normal                 Hepatic/GI:  Hepatic/GI Normal                    Musculoskeletal:  Musculoskeletal Normal                Neurological:  Neurology Normal                                      Endocrine:        Obesity / BMI > 30  Dermatological:  Skin Normal    Psych:  Psychiatric Normal                       Anesthesia Plan  Type of Anesthesia, risks & benefits discussed:    Anesthesia Type: Epidural  Intra-op Monitoring Plan: Standard ASA Monitors  Post Op Pain Control Plan: epidural analgesia  Informed Consent: Informed consent signed with the Patient and all parties understand the risks and agree with anesthesia plan.  All questions answered.   ASA Score: 2  Day of Surgery Review of History & Physical: H&P Update referred to the surgeon/provider.    Ready For Surgery From Anesthesia Perspective.     .

## 2024-11-13 NOTE — NURSING
Patient History  GA: 39w5d   GP:    Estimated Date of Delivery: 11/15/24   Antepartum complications:   Past Medical History:   Diagnosis Date    Asthma     Hypertension affecting pregnancy in third trimester 2021       OB History    Para Term  AB Living   4 3 1 2   3   SAB IAB Ectopic Multiple Live Births         0 3      # Outcome Date GA Lbr Nahid/2nd Weight Sex Type Anes PTL Lv   4 Current            3  22 35w2d  3.03 kg (6 lb 10.9 oz) M Vag-Spont EPI Y DOUG   2  21 34w1d 01:18 / 00:04 1.82 kg (4 lb 0.2 oz) F Vag-Spont EPI Y DOUG   1 Term 16 38w5d  3.738 kg (8 lb 3.9 oz) M Vag-Vacuum EPI N DOUG      VS: Pulse:  [91-96] 96  SpO2:  [99 %-100 %] 100 %  BP: (121)/(71) 121/71   Complaints:  pt presented with c/o contractions every 5 mins. That started this morning at 5am. Pt denies lof, vag. Bleeding or recent sex.      Number of past c-sections: 0  History of past pregnancy complications:   Pain level on admit:   Pain reassessment:   Fetal movement:   Uterine Activity   Contraction intensity:  Contraction Frequency (minutes):   Contractions per 10 minutes:  Contraction Duration (seconds):   Uterine tone: soft  FHR baseline: 130  Variability: moderate  Accels: present  Decels:   SVE  Dilation: 3  Effacement:50  Station: -3  Name of second nurse verification of strip:

## 2024-11-13 NOTE — PLAN OF CARE
VSS, breastfeeding on demand, encouraged 8 times in 24 hours, wearing supportive bra. Fundus and lochia WDL. Voiding, passing flatus, ambulating and tolerating regular diet well. Bonding well with baby. Pain being managed with scheduled motrin and tylenol, and with percocet scheduled as needed. Stated an understanding to POC.

## 2024-11-13 NOTE — HOSPITAL COURSE
On Labor and Delivery, vitals stable  FHT reactive  Cervix at 4 cm  Quickly progressed to complete  Terminal fetal bradycardia    Viable female infant at 1327 24  Weight: 7#3 3260 gm  Apgar: 8/9  Placenta: spontaneous and intact with three vessels  EBL: 150 cc  Small periurethral/labial laceration.  Repaired with 3.0 chromic  No complication  No specimen    2024  Doing well.  Breast-feeding    11/15/2024  Postpartum course was benign.  She is breast-feeding well.  Exam was benign with patient afebrile, vitals stable, and minimal bleeding.  Normal activities.  Patient discharged home on postpartum day #2, 11/15/24   Discharge medications include Motrin, prenatal vitamins, and iron supplement.  Follow-up with Ms. Janis Preston PA-C in one week for blood pressure check.  Patient with history of PIH in previous pregnancy.  Not this past pregnancy.    Mingo Santamaria MD.

## 2024-11-13 NOTE — L&D DELIVERY NOTE
[facility] - Labor & Delivery  Vaginal Delivery   Operative Note    SUMMARY     Normal spontaneous vaginal delivery of live infant, was placed on mothers abdomen for skin to skin and bulb suctioning performed.  Infant delivered position OA over intact perineum.  Nuchal cord: No.    Spontaneous delivery of placenta and IV pitocin given noting good uterine tone.  Periurethral laceration.  Repaired with 3.0 chromic .  Patient tolerated delivery well. Sponge needle and lap counted correctly x2.    Indications: Normal vaginal delivery  Pregnancy complicated by:   Patient Active Problem List   Diagnosis    History of pregnancy induced hypertension    Normal vaginal delivery     Admitting GA: 39w5d    Delivery Information for Khadra Le    Birth information:  YOB: 2025   Time of birth: 1:27 PM   Sex: female   Head Delivery Date/Time: 2024  1:27 PM   Delivery type: Vaginal, Spontaneous   Gestational Age: 39w5d       Delivery Providers    Delivering clinician: Mingo Santamaria MD   Provider Role    Akilah Nuñez RN Tammi, Namita Duarte RN               Measurements    Weight: 3260 g  Weight (lbs): 7 lb 3 oz  Length:          Apgars    Living status: Living  Apgar Component Scores:  1 min.:  5 min.:  10 min.:  15 min.:  20 min.:    Skin color:  0  1       Heart rate:  2  2       Reflex irritability:  2  2       Muscle tone:  2  2       Respiratory effort:  2  2       Total:  8  9                Operative Delivery    Forceps attempted?: No  Vacuum extractor attempted?: No         Shoulder Dystocia    Shoulder dystocia present?: No           Presentation    Presentation: Vertex           Interventions/Resuscitation    Method: Blow By Oxygen, Tactile Stimulation, Deep Suctioning       Cord    Vessels: 3 vessels  Complications: None  Delayed Cord Clamping?: Yes  Cord Clamped Date/Time: 2024  1:28 PM  Cord Blood Disposition: Sent with Baby  Gases Sent?: No  Stem Cell  Collection (by MD): No       Placenta    Placenta delivery date/time: 2024 1330  Placenta removal: Spontaneous  Placenta appearance: Intact  Placenta disposition: Discarded           Labor Events:       labor: No     Labor Onset Date/Time:         Dilation Complete Date/Time:         Start Pushing Date/Time:         Start Pushing Date/Time:       Rupture Date/Time: 24 1159        Rupture type: ARM (Artificial Rupture)        Fluid Amount:       Fluid Color: Clear              steroids: None     Antibiotics given for GBS: No     Induction:       Indications for induction:        Augmentation: amniotomy;oxytocin     Indications for augmentation:       Labor complications: None     Additional complications:          Cervical ripening:                     Delivery:      Episiotomy: None     Indication for Episiotomy:       Perineal Lacerations:   Repaired:      Periurethral Laceration:   Repaired:     Labial Laceration: right Repaired: Yes   Sulcus Laceration:   Repaired:     Vaginal Laceration:   Repaired:     Cervical Laceration:   Repaired:     Repair suture:       Repair # of packets: 1     Last Value - EBL - Nursing (mL):       Sum - EBL - Nursing (mL): 0     Last Value - EBL - Anesthesia (mL):      Calculated QBL (mL): 100     Running total QBL (mL): 100     Vaginal Sweep Performed: Yes     Surgicount Correct: Yes     Vaginal Packing: No Quantity:       Other providers:       Anesthesia    Method: Epidural          Details (if applicable):  Trial of Labor      Categorization:      Priority:     Indications for :     Incision Type:       Additional  information:  Forceps:    Vacuum:    Breech:    Observed anomalies    Other (Comments):

## 2024-11-13 NOTE — SUBJECTIVE & OBJECTIVE
Obstetric HPI:  Patient reports regular and strong contractions, active fetal movement, No vaginal bleeding , No loss of fluid     This pregnancy has been complicated by   History of PIH and  deliveries    OB History    Para Term  AB Living   4 3 1 2 0 3   SAB IAB Ectopic Multiple Live Births   0 0 0 0 3      # Outcome Date GA Lbr Nahid/2nd Weight Sex Type Anes PTL Lv   4 Current            3  22 35w2d  3.03 kg (6 lb 10.9 oz) M Vag-Spont EPI Y DOUG      Name: EDWIGE SON      Apgar1: 9  Apgar5: 9   2  21 34w1d 01:18 / 00:04 1.82 kg (4 lb 0.2 oz) F Vag-Spont EPI Y DOUG      Name: HUAN SON      Apgar1: 9  Apgar5: 9   1 Term 16 38w5d  3.738 kg (8 lb 3.9 oz) M Vag-Vacuum EPI N DOUG      Apgar1: 7  Apgar5: 9     Past Medical History:   Diagnosis Date    Asthma     Hypertension affecting pregnancy in third trimester 2021     No past surgical history on file.    PTA Medications   Medication Sig    albuterol (PROVENTIL/VENTOLIN HFA) 90 mcg/actuation inhaler Inhale 1-2 puffs into the lungs every 6 (six) hours as needed for Wheezing. Rescue    aspirin (ECOTRIN) 81 MG EC tablet Take 1 tablet (81 mg total) by mouth once daily.    prenatal 114-iron a-g-folate 1 (PRENATE ELITE, IRON ASP GLYC,) 20 mg iron- 1 mg Tab Take 1 tablet by mouth once daily.       Review of patient's allergies indicates:   Allergen Reactions    Fish containing products Rash        Family History       Problem Relation (Age of Onset)    Diabetes Maternal Grandfather    Hypertension Mother, Maternal Grandmother    Miscarriages / Stillbirths Mother    Seizures Brother    Stroke Maternal Grandfather          Tobacco Use    Smoking status: Never    Smokeless tobacco: Never   Substance and Sexual Activity    Alcohol use: Not Currently    Drug use: Yes     Types: Marijuana    Sexual activity: Yes     Partners: Male     Birth control/protection: None     Review of Systems   Objective:     Vital  Signs (Most Recent):  Pulse: 79 (11/13/24 0946)  BP: 121/71 (11/13/24 0918)  SpO2: 100 % (11/13/24 0943) Vital Signs (24h Range):  Pulse:  [79-96] 79  SpO2:  [98 %-100 %] 100 %  BP: (121)/(71) 121/71     Weight: 88.6 kg (195 lb 5.2 oz)  Body mass index is 33.53 kg/m².    FHT: 140 Cat 1 (reassuring )  TOCO:  Q 3-5 minutes     Physical Exam     Cervix:  Dilation:  6  Effacement:  75%  Station: -1  Presentation: Vertex     Significant Labs:  Lab Results   Component Value Date    GROUPTRH B POS 11/13/2024    HEPBSAG Non-reactive 05/14/2024    STREPBCULT No Group B Streptococcus isolated 12/17/2015       CBC:   Recent Labs   Lab 11/13/24  1101   WBC 13.34*   RBC 4.41   HGB 11.1*   HCT 37.0      MCV 84   MCH 25.2*   MCHC 30.0*     I have personallly reviewed all pertinent lab results from the last 24 hours.

## 2024-11-13 NOTE — ANESTHESIA PROCEDURE NOTES
Epidural    Patient location during procedure: OB   Reason for block: primary anesthetic   Reason for block: labor analgesia requested by patient and obstetrician  Diagnosis: IUP   Start time: 11/13/2024 11:41 AM  Timeout: 11/13/2024 11:40 AM  End time: 11/13/2024 11:45 AM  Surgery related to: Planned vaginal delivery    Staffing  Performing Provider: Yuri Mooney II, MD  Authorizing Provider: Yuri Mooney II, MD    Staffing  Performed by: Yuri Mooney II, MD  Authorized by: Yuri Mooney II, MD        Preanesthetic Checklist  Completed: patient identified, IV checked, site marked, risks and benefits discussed, surgical consent, monitors and equipment checked, pre-op evaluation, timeout performed, anesthesia consent given, hand hygiene performed and patient being monitored  Preparation  Patient position: sitting  Prep: Betadine  Patient monitoring: Pulse Ox and Blood Pressure  Reason for block: primary anesthetic   Epidural  Skin Anesthetic: lidocaine 1%  Skin Wheal: 3 mL  Administration type: continuous  Approach: midline  Interspace: L4-5    Injection technique: SEGUNDO air  Needle and Epidural Catheter  Needle type: Tuohy   Needle gauge: 17  Needle length: 3.5 inches  Needle insertion depth: 6 cm  Catheter type: springwound and multi-orifice  Catheter size: 18 G  Catheter at skin depth: 11 cm  Insertion Attempts: 1  Test dose: 3 mL of lidocaine 1.5% with Epi 1-to-200,000  Additional Documentation: incremental injection, negative aspiration for heme and CSF, no paresthesia on injection, no signs/symptoms of IV or SA injection, no significant pain on injection and no significant complaints from patient  Needle localization: anatomical landmarks  Medications:  Volume per aspiration: 0 mL  Time between aspirations: 2 minutes   Assessment  Upper dermatomal levels - Left: T10  Right: T10   Dermatomal levels determined by pinch or prick  Ease of block: easy  Patient's tolerance of the procedure:  comfortable throughout block and no complaints  Additional Notes  CSE performed.  1ml of .25% bupivacaine injected intrathecally  No inadvertent dural puncture with Tuohy.  Dural puncture not performed with spinal needle

## 2024-11-14 LAB
BASOPHILS # BLD AUTO: 0.02 K/UL (ref 0–0.2)
BASOPHILS NFR BLD: 0.1 % (ref 0–1.9)
DIFFERENTIAL METHOD BLD: ABNORMAL
EOSINOPHIL # BLD AUTO: 0 K/UL (ref 0–0.5)
EOSINOPHIL NFR BLD: 0.2 % (ref 0–8)
ERYTHROCYTE [DISTWIDTH] IN BLOOD BY AUTOMATED COUNT: 13.9 % (ref 11.5–14.5)
HCT VFR BLD AUTO: 33.7 % (ref 37–48.5)
HGB BLD-MCNC: 10.7 G/DL (ref 12–16)
IMM GRANULOCYTES # BLD AUTO: 0.09 K/UL (ref 0–0.04)
IMM GRANULOCYTES NFR BLD AUTO: 0.6 % (ref 0–0.5)
LYMPHOCYTES # BLD AUTO: 2.9 K/UL (ref 1–4.8)
LYMPHOCYTES NFR BLD: 17.8 % (ref 18–48)
MCH RBC QN AUTO: 26.3 PG (ref 27–31)
MCHC RBC AUTO-ENTMCNC: 31.8 G/DL (ref 32–36)
MCV RBC AUTO: 83 FL (ref 82–98)
MONOCYTES # BLD AUTO: 1 K/UL (ref 0.3–1)
MONOCYTES NFR BLD: 6.4 % (ref 4–15)
NEUTROPHILS # BLD AUTO: 12.1 K/UL (ref 1.8–7.7)
NEUTROPHILS NFR BLD: 74.9 % (ref 38–73)
NRBC BLD-RTO: 0 /100 WBC
PLATELET # BLD AUTO: 252 K/UL (ref 150–450)
PMV BLD AUTO: 11.3 FL (ref 9.2–12.9)
RBC # BLD AUTO: 4.07 M/UL (ref 4–5.4)
WBC # BLD AUTO: 16.15 K/UL (ref 3.9–12.7)

## 2024-11-14 PROCEDURE — 11000001 HC ACUTE MED/SURG PRIVATE ROOM

## 2024-11-14 PROCEDURE — 85025 COMPLETE CBC W/AUTO DIFF WBC: CPT | Performed by: OBSTETRICS & GYNECOLOGY

## 2024-11-14 PROCEDURE — 99232 SBSQ HOSP IP/OBS MODERATE 35: CPT | Mod: ,,, | Performed by: OBSTETRICS & GYNECOLOGY

## 2024-11-14 PROCEDURE — 36415 COLL VENOUS BLD VENIPUNCTURE: CPT | Performed by: OBSTETRICS & GYNECOLOGY

## 2024-11-14 PROCEDURE — 25000003 PHARM REV CODE 250: Performed by: OBSTETRICS & GYNECOLOGY

## 2024-11-14 RX ADMIN — IBUPROFEN 600 MG: 600 TABLET, FILM COATED ORAL at 05:11

## 2024-11-14 RX ADMIN — ACETAMINOPHEN 650 MG: 325 TABLET ORAL at 11:11

## 2024-11-14 RX ADMIN — ACETAMINOPHEN 650 MG: 325 TABLET ORAL at 05:11

## 2024-11-14 RX ADMIN — IBUPROFEN 600 MG: 600 TABLET, FILM COATED ORAL at 11:11

## 2024-11-14 RX ADMIN — OXYCODONE HYDROCHLORIDE AND ACETAMINOPHEN 1 TABLET: 5; 325 TABLET ORAL at 02:11

## 2024-11-14 RX ADMIN — OXYCODONE HYDROCHLORIDE AND ACETAMINOPHEN 1 TABLET: 5; 325 TABLET ORAL at 07:11

## 2024-11-14 RX ADMIN — SENNOSIDES AND DOCUSATE SODIUM 1 TABLET: 50; 8.6 TABLET ORAL at 08:11

## 2024-11-14 RX ADMIN — IBUPROFEN 600 MG: 600 TABLET, FILM COATED ORAL at 12:11

## 2024-11-14 RX ADMIN — ACETAMINOPHEN 650 MG: 325 TABLET ORAL at 12:11

## 2024-11-14 NOTE — PROGRESS NOTES
[facility]  Obstetrics  Postpartum Progress Note    Patient Name: Fiona Le  MRN: [id number]  Admission Date: 2024  Hospital Length of Stay: 1 days  Attending Physician: Mingo Santamaria MD  Primary Care Provider: Charles Hein MD    Subjective:     Principal Problem:Normal vaginal delivery    Hospital Course:  On Labor and Delivery, vitals stable  FHT reactive  Cervix at 4 cm  Quickly progressed to complete  Terminal fetal bradycardia    Viable female infant at 1327 24  Weight: 7#3 3260 gm  Apgar: 8/9  Placenta: spontaneous and intact with three vessels  EBL: 150 cc  Small periurethral/labial laceration.  Repaired with 3.0 chromic  No complication  No specimen    2024  Doing well.  Breast-feeding    Interval History: Status post vaginal delivery 24    She is doing well this morning. She is tolerating a regular diet without nausea or vomiting. She is voiding spontaneously. She is ambulating. She has passed flatus, and has not a BM. Vaginal bleeding is mild. She denies fever or chills. Abdominal pain is mild and controlled with oral medications. She Is breastfeeding. She desires circumcision for her male baby: not applicable.    Objective:     Vital Signs (Most Recent):  Temp: 98.3 °F (36.8 °C) (24)  Pulse: 73 (24)  Resp: 20 (24)  BP: 117/65 (24)  SpO2: 98 % (24) Vital Signs (24h Range):  Temp:  [98.3 °F (36.8 °C)-98.7 °F (37.1 °C)] 98.3 °F (36.8 °C)  Pulse:  [] 73  Resp:  [18-20] 20  SpO2:  [92 %-100 %] 98 %  BP: (110-139)/(57-90) 117/65     Weight: 88.6 kg (195 lb 5.2 oz)  Body mass index is 33.53 kg/m².      Intake/Output Summary (Last 24 hours) at 2024 0703  Last data filed at 2024 0549  Gross per 24 hour   Intake 2228.08 ml   Output 3200 ml   Net -971.92 ml         Significant Labs:  Lab Results   Component Value Date    [facility]    HEPBSAG Non-reactive 2024     STREPBCULT No Group B Streptococcus isolated 2015     Recent Labs   Lab 24  0512   HGB 10.7*   HCT 33.7*       CBC:   Recent Labs   Lab 24  0512   WBC 16.15*   RBC 4.07   HGB 10.7*   HCT 33.7*      MCV 83   MCH 26.3*   MCHC 31.8*     I have personallly reviewed all pertinent lab results from the last 24 hours.    Physical Exam:   Constitutional: She appears well-developed and well-nourished. No distress.    HENT:   Head: Normocephalic and atraumatic.    Eyes: EOM are normal.     Cardiovascular:  Normal rate.             Pulmonary/Chest: Effort normal. No respiratory distress.        Abdominal: Soft. She exhibits no distension. There is no abdominal tenderness. There is no rebound and no guarding.             Musculoskeletal: Normal range of motion.       Neurological: She is alert.    Skin: Skin is warm and dry.    Psychiatric: She has a normal mood and affect.       Review of Systems  Assessment/Plan:     24 y.o. female  for:    * Normal vaginal delivery  Routine postpartum care  Watch vaginal bleeding  Pain control  Lactation assistance  Discharge home tomorrow, 11/15/24 once milestones are met      History of pregnancy induced hypertension  Normotensive now  Will monitor        Disposition: As patient meets milestones, will plan to discharge home.    Mingo Santamaria MD  Obstetrics  [facility]

## 2024-11-14 NOTE — PLAN OF CARE
VSS, breastfeeding on demand, encouraged 8 times in 24 hours, wearing supportive bra. Fundus and lochia WDL. Voiding, passing flatus, ambulating and tolerating regular diet well. Bonding well with baby. Pain being managed with scheduled tylenol and motrin, and percocet as needed. Stated an understanding to POC.

## 2024-11-14 NOTE — SUBJECTIVE & OBJECTIVE
Interval History: Status post vaginal delivery 11/13/24    She is doing well this morning. She is tolerating a regular diet without nausea or vomiting. She is voiding spontaneously. She is ambulating. She has passed flatus, and has not a BM. Vaginal bleeding is mild. She denies fever or chills. Abdominal pain is mild and controlled with oral medications. She Is breastfeeding. She desires circumcision for her male baby: not applicable.    Objective:     Vital Signs (Most Recent):  Temp: 98.3 °F (36.8 °C) (11/14/24 0448)  Pulse: 73 (11/14/24 0448)  Resp: 20 (11/14/24 0448)  BP: 117/65 (11/14/24 0448)  SpO2: 98 % (11/14/24 0448) Vital Signs (24h Range):  Temp:  [98.3 °F (36.8 °C)-98.7 °F (37.1 °C)] 98.3 °F (36.8 °C)  Pulse:  [] 73  Resp:  [18-20] 20  SpO2:  [92 %-100 %] 98 %  BP: (110-139)/(57-90) 117/65     Weight: 88.6 kg (195 lb 5.2 oz)  Body mass index is 33.53 kg/m².      Intake/Output Summary (Last 24 hours) at 11/14/2024 0723  Last data filed at 11/14/2024 0549  Gross per 24 hour   Intake 2228.08 ml   Output 3200 ml   Net -971.92 ml         Significant Labs:  Lab Results   Component Value Date    GROUPTRH B POS 11/13/2024    HEPBSAG Non-reactive 05/14/2024    STREPBCULT No Group B Streptococcus isolated 12/17/2015     Recent Labs   Lab 11/14/24  0512   HGB 10.7*   HCT 33.7*       CBC:   Recent Labs   Lab 11/14/24  0512   WBC 16.15*   RBC 4.07   HGB 10.7*   HCT 33.7*      MCV 83   MCH 26.3*   MCHC 31.8*     I have personallly reviewed all pertinent lab results from the last 24 hours.    Physical Exam:   Constitutional: She appears well-developed and well-nourished. No distress.    HENT:   Head: Normocephalic and atraumatic.    Eyes: EOM are normal.     Cardiovascular:  Normal rate.             Pulmonary/Chest: Effort normal. No respiratory distress.        Abdominal: Soft. She exhibits no distension. There is no abdominal tenderness. There is no rebound and no guarding.             Musculoskeletal:  Normal range of motion.       Neurological: She is alert.    Skin: Skin is warm and dry.    Psychiatric: She has a normal mood and affect.       Review of Systems

## 2024-11-14 NOTE — ASSESSMENT & PLAN NOTE
Routine postpartum care  Watch vaginal bleeding  Pain control  Lactation assistance  Discharge home tomorrow, 11/15/24 once milestones are met

## 2024-11-14 NOTE — PROGRESS NOTES
Infant fall prevention reviewed with mother. Mother verbalized understanding with no questions. Reviewed safety prevention facts and interventions which include the following:    *While you and your baby are in the hospital, please remember these important safety tips to keep your baby safe.     -If you are feeling weak, faint, or unsteady on your feet, DO NOT life your baby. Press the nurse call button and ask for help.  -Keep your bed in the LOWEST position (closest to the floor) at all times.  -Do NOT sleep with your baby in your bed, sofa, or chair as this may place your baby at risk of serious injury.  -When you want to sleep, first place the baby in the bassinet.  -If we find you asleep with the baby in your arms, we will move your baby to the bassinet.    *Keep your baby safe.    -Breastfeeding every 2-3 hours  -Bottle feeding every 3-4 hours  -Do NOT feed the baby in the crib with the bottle propped up. Feed the baby in your arms.  -NO heavy blankets in the crib.  -NO stuffed animals in the infants crib.  -NO balloons attached to the infant's crib.  -Keep the infant WRAPPED

## 2024-11-14 NOTE — LACTATION NOTE
11/14/24 1100   Maternal Assessment   Breast Density Bilateral:;soft   Areola Bilateral:;elastic   Nipples Right:;graspable   Maternal Infant Feeding   Maternal Emotional State independent   Pain with Feeding no   Latch Assistance no     Infant in family member's arms-alert, hands to mouth. Mother states breastfeeding has been going well. Takes infant from family member and independently latches her on right breast in football hold.  Infant able to obtain and sustain effective latch. Suck and swallow verified. Patient denies pain with breastfeeding. Basic breastfeeding information reviewed. Patient verbalized understanding. All questions answered. Told to call for further lactation support needs.

## 2024-11-15 VITALS
HEART RATE: 76 BPM | BODY MASS INDEX: 33.34 KG/M2 | TEMPERATURE: 98 F | OXYGEN SATURATION: 99 % | SYSTOLIC BLOOD PRESSURE: 137 MMHG | DIASTOLIC BLOOD PRESSURE: 76 MMHG | WEIGHT: 195.31 LBS | RESPIRATION RATE: 20 BRPM | HEIGHT: 64 IN

## 2024-11-15 PROCEDURE — 25000003 PHARM REV CODE 250: Performed by: OBSTETRICS & GYNECOLOGY

## 2024-11-15 PROCEDURE — 99238 HOSP IP/OBS DSCHRG MGMT 30/<: CPT | Mod: ,,, | Performed by: OBSTETRICS & GYNECOLOGY

## 2024-11-15 RX ORDER — IBUPROFEN 600 MG/1
600 TABLET ORAL EVERY 6 HOURS
Qty: 40 TABLET | Refills: 1 | Status: SHIPPED | OUTPATIENT
Start: 2024-11-15

## 2024-11-15 RX ORDER — OXYTOCIN/0.9 % SODIUM CHLORIDE 15/250 ML
95 PLASTIC BAG, INJECTION (ML) INTRAVENOUS ONCE AS NEEDED
Status: DISCONTINUED | OUTPATIENT
Start: 2024-11-15 | End: 2024-11-15 | Stop reason: HOSPADM

## 2024-11-15 RX ORDER — OXYTOCIN/0.9 % SODIUM CHLORIDE 15/250 ML
95 PLASTIC BAG, INJECTION (ML) INTRAVENOUS CONTINUOUS PRN
Status: DISCONTINUED | OUTPATIENT
Start: 2024-11-15 | End: 2024-11-15 | Stop reason: HOSPADM

## 2024-11-15 RX ADMIN — IBUPROFEN 600 MG: 600 TABLET, FILM COATED ORAL at 05:11

## 2024-11-15 RX ADMIN — IBUPROFEN 600 MG: 600 TABLET, FILM COATED ORAL at 12:11

## 2024-11-15 RX ADMIN — ACETAMINOPHEN 650 MG: 325 TABLET ORAL at 12:11

## 2024-11-15 RX ADMIN — OXYCODONE HYDROCHLORIDE AND ACETAMINOPHEN 1 TABLET: 5; 325 TABLET ORAL at 04:11

## 2024-11-15 NOTE — PROGRESS NOTES
Discharge teaching given to pt. All questions answered. Enc. Pt. To call MD office once discharged for any questions or concerns. RX and f/u appt. reviewed with pt., no questions at this time.   Family at bedside.

## 2024-11-15 NOTE — LACTATION NOTE
11/15/24 1000   Maternal Assessment   Breast Density Bilateral:;soft   Areola Bilateral:;elastic   Nipples Bilateral:;everted;graspable   Maternal Infant Feeding   Maternal Emotional State independent;relaxed   Infant Positioning clutch/football   Signs of Milk Transfer audible swallow;infant jaw motion present   Pain with Feeding no   Latch Assistance no     Patient breast fed her last child.  She is independent and relaxed.  Has not needed assistance with latch.  States no pain with latch and breastfeeding.  Discussed infants voiding and stool pattern and what stool should look like if exclusively breastfeeding or giving formula.  Instructed patient to continue taking prenatal vitamins and that some medications may cross into breast milk and to check with her physician or pharmacist before taking any medication.  Discussed signs and symptoms of engorgement and mastitis and when to call the physician.  Instructed patient that the baby should only receive breast milk or formula for the first 6 months.  No water or juice.  Instructed the patient to feed the baby or pump 8 or more times in 24 hours.  Discussed pumping and how to store EBM.  Discussed how to thaw/warm EBM. Verbalized understanding.  All questions answered.  Lactation discharge instructions completed.

## 2024-11-15 NOTE — PLAN OF CARE
Problem: Breastfeeding  Goal: Effective Breastfeeding  Outcome: Met  Intervention: Promote Effective Breastfeeding  Flowsheets (Taken 11/15/2024 1000)  Breastfeeding Assistance:   infant latch-on verified   feeding on demand promoted   feeding cue recognition promoted   infant suck/swallow verified   support offered   infant stimulated to wakeful state  Parent-Child Attachment Promotion:   caring behavior modeled   cue recognition promoted   face-to-face positioning promoted   interaction encouraged   parent/caregiver presence encouraged   skin-to-skin contact encouraged   positive reinforcement provided   participation in care promoted  Intervention: Support Exclusive Breastfeeding Success  Flowsheets (Taken 11/15/2024 1000)  Supportive Measures:   active listening utilized   counseling provided   decision-making supported   goal-setting facilitated   self-care encouraged   problem-solving facilitated   positive reinforcement provided   self-responsibility promoted   verbalization of feelings encouraged  Breastfeeding Support:   diary/feeding log utilized   encouragement provided   infant-mother separation minimized   lactation counseling provided   maternal hydration promoted   maternal rest encouraged   maternal nutrition promoted

## 2024-11-15 NOTE — DISCHARGE SUMMARY
[facility]  Obstetrics  Discharge Summary      Patient Name: Fiona Le  MRN: [id number]  Admission Date: 2024  Hospital Length of Stay: 2 days  Discharge Date and Time:  11/15/2024 7:07 AM  Attending Physician: Stewart Santamaria MD   Discharging Provider: Stewart Santamaria MD   Primary Care Provider: Charles Hein MD    HPI: 23 yo  at 39w5  Admitted in active labor with regular contractions and cervical change        * No surgery found *     Hospital Course:   On Labor and Delivery, vitals stable  FHT reactive  Cervix at 4 cm  Quickly progressed to complete  Terminal fetal bradycardia    Viable female infant at 1327 24  Weight: 7#3 3260 gm  Apgar: 8/9  Placenta: spontaneous and intact with three vessels  EBL: 150 cc  Small periurethral/labial laceration.  Repaired with 3.0 chromic  No complication  No specimen    2024  Doing well.  Breast-feeding    11/15/2024  Postpartum course was benign.  She is breast-feeding well.  Exam was benign with patient afebrile, vitals stable, and minimal bleeding.  Normal activities.  Patient discharged home on postpartum day #2, 11/15/24   Discharge medications include Motrin, prenatal vitamins, and iron supplement.  Follow-up with Ms. Janis Preston PA-C in one week for blood pressure check.  Patient with history of PIH in previous pregnancy.  Not this past pregnancy.    Stewart Santamaria MD.       Consults (From admission, onward)          Status Ordering Provider     Inpatient consult to Lactation  Once        Provider:  (Not yet assigned)    Acknowledged STEWART SANTAMARIA            Final Active Diagnoses:    Diagnosis Date Noted POA    PRINCIPAL PROBLEM:  Status post normal delivery in completely normal case [O80] 2016 Not Applicable    History of pregnancy induced hypertension [Z87.59] 2016 Not Applicable      Problems Resolved During this Admission:    Diagnosis Date Noted Date Resolved POA    39 weeks gestation of pregnancy [Z3A.39]  2024 Not Applicable    Pregnancy [Z34.90] 2015 Not Applicable        Significant Diagnostic Studies: Labs: All labs within the past 24 hours have been reviewed      Feeding Method: breast    Immunizations       Date Immunization Status Dose Route/Site Given by    24 155 MMR Incomplete 0.5 mL Subcutaneous/     24 1552 Tdap Incomplete 0.5 mL Intramuscular/             Delivery:    Episiotomy: None   Lacerations:     Repair suture:     Repair # of packets: 1   Blood loss (ml):       Birth information:  YOB: 2025   Time of birth: 1:27 PM   Sex: female   Delivery type: Vaginal, Spontaneous   Gestational Age: 39w5d     Measurements    Weight: 3260 g  Weight (lbs): 7 lb 3 oz  Length:          Delivery Clinician: Delivery Providers    Delivering clinician: Mingo Santamaria MD   Provider Role    Akilah Nuñez RN Tammi, Springer Koelling, Candace L, RN              Additional  information:  Forceps:    Vacuum:    Breech:    Observed anomalies      Living?:     Apgars    Living status: Living  Apgar Component Scores:  1 min.:  5 min.:  10 min.:  15 min.:  20 min.:    Skin color:  0  1       Heart rate:  2  2       Reflex irritability:  2  2       Muscle tone:  2  2       Respiratory effort:  2  2       Total:  8  9                Placenta: Delivered:       appearance  Pending Diagnostic Studies:       None            Discharged Condition: good    Disposition: Home or Self Care    Follow Up:   Follow-up Information       Janis Preston PA-C Follow up in 1 week(s).    Specialty: Obstetrics and Gynecology  Why: blood pressure follow-up  Contact information:  [address] [place] [address] [id number] [phone]                           Patient Instructions:      BREAST PUMP FOR HOME USE     Order Specific Question Answer Comments   Type of pump: Electric    Weight: 88.6 kg (195 lb 5.2 oz)    Length of need (1-99 months): 99      No dressing  needed     Medications:  Current Discharge Medication List        START taking these medications    Details   ibuprofen (ADVIL,MOTRIN) 600 MG tablet Take 1 tablet (600 mg total) by mouth every 6 (six) hours.  Qty: 40 tablet, Refills: 1           CONTINUE these medications which have NOT CHANGED    Details   albuterol (PROVENTIL/VENTOLIN HFA) 90 mcg/actuation inhaler Inhale 1-2 puffs into the lungs every 6 (six) hours as needed for Wheezing. Rescue  Qty: 18 g, Refills: 0      prenatal 114-iron a-g-folate 1 (PRENATE ELITE, IRON ASP GLYC,) 20 mg iron- 1 mg Tab Take 1 tablet by mouth once daily.  Qty: 30 tablet, Refills: 8    Comments: Please give patient medication covered by her insurance  Associated Diagnoses: Visit for confirmation of pregnancy test result with physical exam           STOP taking these medications       aspirin (ECOTRIN) 81 MG EC tablet Comments:   Reason for Stopping:               Mingo Santamaria MD  Obstetrics  [facility]

## 2024-11-15 NOTE — ANESTHESIA POSTPROCEDURE EVALUATION
Anesthesia Post Evaluation    Patient: Fiona Le    Procedure(s) Performed: * No procedures listed *    Final Anesthesia Type: epidural      Patient location during evaluation: PACU  Patient participation: Yes- Able to Participate  Level of consciousness: awake and alert  Post-procedure vital signs: reviewed and stable  Pain management: adequate  Airway patency: patent    PONV status at discharge: No PONV  Anesthetic complications: no      Respiratory status: spontaneous ventilation and room air  Hydration status: euvolemic  Follow-up not needed.              Vitals Value Taken Time   /55 11/15/24 0424   Temp 36.9 °C (98.5 °F) 11/15/24 0424   Pulse 80 11/15/24 0424   Resp 20 11/15/24 0447   SpO2 98 % 11/15/24 0424         No case tracking events are documented in the log.      Pain/Uday Score: Pain Rating Prior to Med Admin: 7 (11/15/2024  5:19 AM)  Pain Rating Post Med Admin: 5 (11/15/2024  6:00 AM)

## 2024-11-15 NOTE — PLAN OF CARE
Problem: Adult Inpatient Plan of Care  Goal: Plan of Care Review  Outcome: Progressing  Goal: Patient-Specific Goal (Individualized)  Outcome: Progressing  Goal: Absence of Hospital-Acquired Illness or Injury  Outcome: Progressing  Goal: Optimal Comfort and Wellbeing  Outcome: Progressing  Goal: Readiness for Transition of Care  Outcome: Progressing     Problem:  Fall Injury Risk  Goal: Absence of Fall, Infant Drop and Related Injury  Outcome: Progressing     Problem: Infection  Goal: Absence of Infection Signs and Symptoms  Outcome: Progressing     Problem: Breastfeeding  Goal: Effective Breastfeeding  Outcome: Progressing     Problem: Postpartum (Vaginal Delivery)  Goal: Successful Parent Role Transition  Outcome: Progressing

## 2024-11-16 ENCOUNTER — PATIENT MESSAGE (OUTPATIENT)
Dept: LACTATION | Facility: CLINIC | Age: 24
End: 2024-11-16
Payer: MEDICAID

## 2024-11-19 ENCOUNTER — HOSPITAL ENCOUNTER (EMERGENCY)
Facility: HOSPITAL | Age: 24
Discharge: HOME | End: 2024-11-19
Attending: EMERGENCY MEDICINE
Payer: MEDICAID

## 2024-11-19 VITALS
DIASTOLIC BLOOD PRESSURE: 67 MMHG | SYSTOLIC BLOOD PRESSURE: 121 MMHG | WEIGHT: 196 LBS | TEMPERATURE: 98 F | HEIGHT: 64 IN | BODY MASS INDEX: 33.46 KG/M2 | HEART RATE: 82 BPM | OXYGEN SATURATION: 97 % | RESPIRATION RATE: 18 BRPM

## 2024-11-19 DIAGNOSIS — K62.89 RECTAL PAIN: ICD-10-CM

## 2024-11-19 DIAGNOSIS — K64.4 EXTERNAL HEMORRHOIDS: Primary | ICD-10-CM

## 2024-11-19 PROCEDURE — 99284 EMERGENCY DEPT VISIT MOD MDM: CPT | Mod: 25

## 2024-11-19 PROCEDURE — 96372 THER/PROPH/DIAG INJ SC/IM: CPT

## 2024-11-19 PROCEDURE — 63600175 PHARM REV CODE 636 W HCPCS

## 2024-11-19 PROCEDURE — 25000003 PHARM REV CODE 250

## 2024-11-19 RX ORDER — DOCUSATE SODIUM 100 MG/1
100 CAPSULE, LIQUID FILLED ORAL 2 TIMES DAILY
Qty: 60 CAPSULE | Refills: 0 | Status: SHIPPED | OUTPATIENT
Start: 2024-11-19 | End: 2024-12-19

## 2024-11-19 RX ORDER — LIDOCAINE HYDROCHLORIDE 20 MG/ML
JELLY TOPICAL
Status: COMPLETED | OUTPATIENT
Start: 2024-11-19 | End: 2024-11-19

## 2024-11-19 RX ORDER — ACETAMINOPHEN 500 MG
500 TABLET ORAL EVERY 4 HOURS PRN
Qty: 20 TABLET | Refills: 0 | Status: SHIPPED | OUTPATIENT
Start: 2024-11-19 | End: 2024-11-24

## 2024-11-19 RX ORDER — MORPHINE SULFATE 4 MG/ML
6 INJECTION, SOLUTION INTRAMUSCULAR; INTRAVENOUS
Status: COMPLETED | OUTPATIENT
Start: 2024-11-19 | End: 2024-11-19

## 2024-11-19 RX ORDER — HYDROCORTISONE 25 MG/G
CREAM TOPICAL
Status: COMPLETED | OUTPATIENT
Start: 2024-11-19 | End: 2024-11-19

## 2024-11-19 RX ORDER — HYDROCORTISONE 25 MG/G
CREAM TOPICAL 2 TIMES DAILY
Qty: 28 G | Refills: 0 | Status: SHIPPED | OUTPATIENT
Start: 2024-11-19 | End: 2024-12-03

## 2024-11-19 RX ADMIN — MORPHINE SULFATE 6 MG: 4 INJECTION INTRAVENOUS at 07:11

## 2024-11-19 RX ADMIN — LIDOCAINE HYDROCHLORIDE 10 ML: 20 JELLY TOPICAL at 06:11

## 2024-11-19 RX ADMIN — HYDROCORTISONE: 25 CREAM TOPICAL at 09:11

## 2024-11-19 NOTE — Clinical Note
"Fiona Valdeskarma Le was seen and treated in our emergency department on 11/19/2024.  She may return to work on 11/23/2024.       If you have any questions or concerns, please don't hesitate to call.      Mar Andersen PA-C"

## 2024-11-20 ENCOUNTER — PATIENT MESSAGE (OUTPATIENT)
Dept: SURGERY | Facility: CLINIC | Age: 24
End: 2024-11-20
Payer: MEDICAID

## 2024-11-20 ENCOUNTER — E-CONSULT (OUTPATIENT)
Dept: SURGERY | Facility: CLINIC | Age: 24
End: 2024-11-20
Payer: MEDICAID

## 2024-11-20 DIAGNOSIS — K64.5 PERIANAL VENOUS THROMBOSIS: Primary | ICD-10-CM

## 2024-11-20 NOTE — DISCHARGE INSTRUCTIONS

## 2024-11-20 NOTE — CONSULTS
[facility]  Response for E-Consult     Patient Name: Fiona Le  MRN: [id number]  Primary Care Provider: Charles Hein MD   Requesting Provider: Magno Toledo MD  Consults    After evaluation of your eConsult clinical questions, I believe the patient should be scheduled for an office visit in our specialty due to circumferential hemorrhoids.    Total time of Consultation: 5 minute    *This eConsult is based on the clinical data available to me and is furnished without benefit of a physician examination.  The eConsult will need to be interpreted in light of any clinical issues of changes in patient status not available to me at the time rime of filing this eConsults.  Significant changes in patient condition of level of acuity should result in a referral for in person consultation and reevaluation.  Please alert me if you have any furth questions.     Thank you for this eConsult referral.     MD [facility]

## 2024-11-20 NOTE — ED PROVIDER NOTES
Encounter Date: 11/19/2024       History     Chief Complaint   Patient presents with    Hemorrhoids     Pt to ED with c/o painful hemorrhoid that she noticed on today. Reports vaginal delivery on 11/13/24.      Patient is a 24-year-old female past medical history of asthma is presenting for evaluation of hemorrhoids.  Patient recently had a vaginal delivery on 11/13/2024.  States yesterday while taking a bowel movement she felt rectal discomfort and noticed a small amount swelling.  Reports today the swelling became worse and has been having worsening pain.  States she is unable to down.  Denies chest pain, shortness breath, abdominal pain.  Patient has not had bowel movement today.  Reports she took ibuprofen which provided little relief.  Last bowel movement was yesterday.  States she is still having vaginal bleeding from birth, unsure about rectal bleeding.        Review of patient's allergies indicates:   Allergen Reactions    Fish containing products Rash     Past Medical History:   Diagnosis Date    Asthma     Hypertension affecting pregnancy in third trimester 6/21/2021     History reviewed. No pertinent surgical history.  Family History   Problem Relation Name Age of Onset    Miscarriages / Stillbirths Mother      Hypertension Mother      Hypertension Maternal Grandmother      Diabetes Maternal Grandfather      Stroke Maternal Grandfather      Seizures Brother       Social History     Tobacco Use    Smoking status: Never    Smokeless tobacco: Never   Substance Use Topics    Alcohol use: Not Currently    Drug use: Yes     Types: Marijuana     Review of Systems   Constitutional:  Negative for chills and fever.   Respiratory:  Negative for cough and shortness of breath.    Cardiovascular:  Negative for chest pain.   Gastrointestinal:  Positive for rectal pain. Negative for abdominal pain, diarrhea, nausea and vomiting.   Genitourinary:  Negative for difficulty urinating and dysuria.   Neurological:  Negative  for headaches.       Physical Exam     Initial Vitals [11/19/24 1720]   BP Pulse Resp Temp SpO2   119/74 95 16 98 °F (36.7 °C) 96 %      MAP       --         Physical Exam    Constitutional: She appears well-developed and well-nourished. She is not diaphoretic. No distress.   HENT:   Head: Normocephalic and atraumatic.   Nose: Nose normal.   Eyes: Conjunctivae and EOM are normal. Pupils are equal, round, and reactive to light.   Neck:   Normal range of motion.  Cardiovascular:  Normal rate and regular rhythm.           Pulmonary/Chest: Breath sounds normal. No respiratory distress.   Genitourinary: Rectum:      External hemorrhoid (Large external hemorrhoid/prolapse noted to the anal area.) present.     Musculoskeletal:         General: Normal range of motion.      Cervical back: Normal range of motion.     Neurological: She is alert and oriented to person, place, and time.   Skin: Skin is warm and dry. Capillary refill takes less than 2 seconds.   Psychiatric: She has a normal mood and affect.         ED Course   Procedures  Labs Reviewed - No data to display       Imaging Results    None          Medications   hydrocortisone 2.5 % cream (has no administration in time range)   LIDOcaine HCl 2% urojet (10 mLs Mucous Membrane Given 11/19/24 1841)   morphine injection 6 mg (6 mg Intramuscular Given 11/19/24 1901)     Medical Decision Making  Patient is a 24-year-old female past medical history of asthma is presenting for evaluation of hemorrhoids.     Differential includes but not limited to hemorrhoids, anal fissure, rectal prolapse, skin tag, abscess.    Patient is alert and afebrile.  Vitals within normal limits.  Patient nontoxic appearing, not in distress.  On physical exam with chaperone present, Magno Toledo MD, large rectal prolapse with swelling noted.  Tenderness to palpation, unable to do digital rectal exam due to pain.  Patient given morphine for pain.  Uro jet and sugar applied for pain and  swelling.  Upon re-evaluation moderate amount of swelling still noted.  Discussed with my attending about further evaluation.  Surgery consulted.  Surgery recommended applying hydrocortisone and lidocaine cream to hemorrhoid, Sitz bath, stool softeners and following up in clinic tomorrow.  Recommendations appreciated.  Discussed with patient symptomatic care that was recommended by surgery.  Recommend taking Tylenol or ibuprofen as needed for pain.  Referral placed for follow up with surgery.  Return precautions given for new or worsening symptoms.  Patient expresses understanding of return precautions follow up plan.    Risk  Prescription drug management.                                      Clinical Impression:  Final diagnoses:  [K64.4] External hemorrhoids (Primary)  [K62.89] Rectal pain          ED Disposition Condition    Discharge Stable          ED Prescriptions       Medication Sig Dispense Start Date End Date Auth. Provider    hydrocortisone (ANUSOL-HC) 2.5 % rectal cream Place rectally 2 (two) times daily. for 14 days 28 g 11/19/2024 12/3/2024 Mar Andersen PA-C    docusate sodium (COLACE) 100 MG capsule Take 1 capsule (100 mg total) by mouth 2 (two) times daily. 60 capsule 11/19/2024 12/19/2024 Mar Andersen PA-C    LIDOcaine 4 % Gel Apply 1 cm topically 3 (three) times daily as needed (for pain). 113 g 11/19/2024 -- Mar Andersen PA-C    acetaminophen (TYLENOL) 500 MG tablet Take 1 tablet (500 mg total) by mouth every 4 (four) hours as needed. 20 tablet 11/19/2024 11/24/2024 Mar Andersen PA-C          Follow-up Information       Follow up With Specialties Details Why Contact Info    Charles Hein MD Pediatrics   [address] [place] [address]  [id number]  [place] [id number]  [phone]      Rm Olmos MD General Surgery, Surgery Schedule an appointment as soon as possible for a visit in 1 day for follow up. please call and set up appointment 8 AM tomorrow  [address]  [address] [place] [id number]  [phone]               Candi Broderick, PADidierC  11/19/24 204

## 2024-11-21 ENCOUNTER — TELEPHONE (OUTPATIENT)
Dept: SURGERY | Facility: CLINIC | Age: 24
End: 2024-11-21
Payer: MEDICAID

## 2024-11-21 ENCOUNTER — PATIENT MESSAGE (OUTPATIENT)
Dept: SURGERY | Facility: CLINIC | Age: 24
End: 2024-11-21
Payer: MEDICAID

## 2024-11-21 NOTE — TELEPHONE ENCOUNTER
Attempted to call patient and mother's number on file. Message stating number was disconnected and not reachable.    Voicemail left on grandparent's phone with callback number regarding appointment for tomorrow with Dr. Nicole.    Epic message sent.

## 2025-01-06 ENCOUNTER — POSTPARTUM VISIT (OUTPATIENT)
Dept: OBSTETRICS AND GYNECOLOGY | Facility: CLINIC | Age: 25
End: 2025-01-06
Payer: MEDICAID

## 2025-01-06 VITALS
BODY MASS INDEX: 32.67 KG/M2 | WEIGHT: 191.38 LBS | HEIGHT: 64 IN | DIASTOLIC BLOOD PRESSURE: 78 MMHG | SYSTOLIC BLOOD PRESSURE: 124 MMHG

## 2025-01-06 DIAGNOSIS — Z30.09 FAMILY PLANNING: ICD-10-CM

## 2025-01-06 PROCEDURE — 99999 PR PBB SHADOW E&M-EST. PATIENT-LVL II: CPT | Mod: PBBFAC,,, | Performed by: OBSTETRICS & GYNECOLOGY

## 2025-01-06 PROCEDURE — 88175 CYTOPATH C/V AUTO FLUID REDO: CPT | Performed by: STUDENT IN AN ORGANIZED HEALTH CARE EDUCATION/TRAINING PROGRAM

## 2025-01-06 PROCEDURE — 99212 OFFICE O/P EST SF 10 MIN: CPT | Mod: PBBFAC,TH | Performed by: OBSTETRICS & GYNECOLOGY

## 2025-01-06 RX ORDER — IBUPROFEN 800 MG/1
800 TABLET ORAL EVERY 6 HOURS
Qty: 30 TABLET | Refills: 1 | Status: SHIPPED | OUTPATIENT
Start: 2025-01-06

## 2025-01-06 NOTE — PROGRESS NOTES
Subjective     Patient ID: Fiona Le is a 24 y.o. female.    Chief Complaint:  Postpartum Follow-up (6 wks PP for  on 2024. Last normal pap was 2021. Pt is breast feeding.)      History of Present Illness  HPI  Ms Le is a 24 years old, status post vaginal delivery on 24.  She comes in today for an exam and followup.  Patient has no current complaints.  No fever or chills.  No nausea or vomiting.  No diarrhea or constipation.  No abdominal or pelvic pain.    She has not resumed normal intercourse.  Patient has begun some walking for exercise. She denies having signs and symptoms of significant depression.  She is breast-feeding well.  Her last Pap smear was performed in .      GYN & OB History  Patient's last menstrual period was 2024 (exact date).   Date of Last Pap: 2021    OB History    Para Term  AB Living   4 4 2 2   4   SAB IAB Ectopic Multiple Live Births         0 4      # Outcome Date GA Lbr Nahid/2nd Weight Sex Type Anes PTL Lv   4 Term 23 39w5d  3.26 kg (7 lb 3 oz) F Vag-Spont EPI N DOUG   3  22 35w2d  3.03 kg (6 lb 10.9 oz) M Vag-Spont EPI Y DOUG   2  21 34w1d 01:18 / 00:04 1.82 kg (4 lb 0.2 oz) F Vag-Spont EPI Y DOUG   1 Term 16 38w5d  3.738 kg (8 lb 3.9 oz) M Vag-Vacuum EPI N DOUG     Past Medical History:   Diagnosis Date    Asthma     Hypertension affecting pregnancy in third trimester 2021       No past surgical history on file.    Family History   Problem Relation Name Age of Onset    Miscarriages / Stillbirths Mother      Hypertension Mother      Hypertension Maternal Grandmother      Diabetes Maternal Grandfather      Stroke Maternal Grandfather      Seizures Brother         Social History     Socioeconomic History    Marital status: Single   Tobacco Use    Smoking status: Never    Smokeless tobacco: Never   Substance and Sexual Activity    Alcohol use: Not Currently    Drug use: Yes     Types: Marijuana     Sexual activity: Yes     Partners: Male     Birth control/protection: None   Social History Narrative    Together since 5/24/2014    Both graduated from [employer]    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     Social Drivers of Health     Financial Resource Strain: Low Risk  (11/13/2024)    Overall Financial Resource Strain (CARDIA)     Difficulty of Paying Living Expenses: Not very hard   Food Insecurity: No Food Insecurity (11/13/2024)    Hunger Vital Sign     Worried About Running Out of Food in the Last Year: Never true     Ran Out of Food in the Last Year: Never true   Transportation Needs: No Transportation Needs (11/13/2024)    TRANSPORTATION NEEDS     Transportation : No   Stress: No Stress Concern Present (11/13/2024)    [employer] [place] [employer] - Occupational Stress Questionnaire     Feeling of Stress : Not at all   Housing Stability: Low Risk  (11/13/2024)    Housing Stability Vital Sign     Unable to Pay for Housing in the Last Year: No     Homeless in the Last Year: No       Current Outpatient Medications   Medication Sig Dispense Refill    albuterol (PROVENTIL/VENTOLIN HFA) 90 mcg/actuation inhaler Inhale 1-2 puffs into the lungs every 6 (six) hours as needed for Wheezing. Rescue 18 g 0    ibuprofen (ADVIL,MOTRIN) 600 MG tablet Take 1 tablet (600 mg total) by mouth every 6 (six) hours. 40 tablet 1    LIDOcaine 4 % Gel Apply 1 cm topically 3 (three) times daily as needed (for pain). 113 g 0    prenatal 114-iron a-g-folate 1 (PRENATE ELITE, IRON ASP GLYC,) 20 mg iron- 1 mg Tab Take 1 tablet by mouth once daily. 30 tablet 8     No current facility-administered medications for this visit.       Review of patient's allergies indicates:   Allergen Reactions    Fish containing products Rash       Review of Systems  Review of Systems   Constitutional:  Positive for fatigue. Negative for activity change, appetite change, chills, fever and unexpected weight change.    HENT:  Negative for mouth sores.    Respiratory:  Negative for cough, shortness of breath and wheezing.    Cardiovascular:  Negative for chest pain and palpitations.   Gastrointestinal:  Negative for abdominal pain, bloating, blood in stool, constipation, nausea and vomiting.   Endocrine: Negative for diabetes and hot flashes.   Genitourinary:  Negative for dysmenorrhea, dyspareunia, dysuria, frequency, hematuria, menorrhagia, menstrual problem, pelvic pain, urgency, vaginal bleeding, vaginal discharge, vaginal pain, urinary incontinence, postcoital bleeding and vaginal odor.   Musculoskeletal:  Negative for back pain and myalgias.   Integumentary:  Negative for rash, breast mass and nipple discharge.   Neurological:  Negative for seizures and headaches.   Psychiatric/Behavioral:  Negative for depression and sleep disturbance. The patient is not nervous/anxious.    Breast: Negative for mass, mastodynia and nipple discharge         Objective   Physical Exam:   Constitutional: She appears well-developed and well-nourished. No distress.   BMI of 32.85    HENT:   Head: Normocephalic and atraumatic.    Eyes: EOM are normal.      Pulmonary/Chest: Effort normal. No respiratory distress.   Breasts: Non-tender, no engorgement, no masses, no retraction, no discharge. Negative for lymphadenopathy.         Abdominal: Soft. She exhibits no distension. There is no abdominal tenderness. There is no rebound and no guarding.     Genitourinary:    Vagina and uterus normal.   No vaginal discharge in the vagina.    Genitourinary Comments: Vulva without any obvious lesions.  Urethral meatus normal size and location without any lesion.  Urethra is non-tender without stricture or discharge.  Bladder is non-tender.  Vaginal vault with good support.  Minimal white discharge noted.  No obvious lesion.  Normal rugation.  Cervix is without any cervical motion tenderness.  No obvious lesion.  Uterus is small, non-tender, normal contour.   Adnexa is without any masses or tenderness.  Perineum without obvious lesion.               Musculoskeletal: Normal range of motion.       Neurological: She is alert.    Skin: Skin is warm and dry.    Psychiatric: She has a normal mood and affect.            Assessment and Plan     1. Postpartum care and examination immediately after delivery    2. Family planning    3. Breast feeding status of mother           Plan:  I have discussed with the patient regarding her condition.  She has recovered well from her delivery  She will continue to breast-feed    I have also discussed with the patient regarding her contraceptive options.  Risks and benefits of all discussed including oral contraceptives, Depo-Provera, OrthoEvra, NuvaRing, Mirena/ParaGard, Nexplanon, sterilization. After extensive dicussion, the patient wishes to have the IUD.  Risks and benefits again discussed.  All of her questions were answered appropriately to her satisfaction.    She would like to try the Mirena  We will order the device  She will be back for insertion    Depo Provera for today per request  Motrin 800 mg per request for cramps  Pap done       ** A female chaperone, Nellie William, was present for the pelvic exam

## 2025-01-10 ENCOUNTER — PROCEDURE VISIT (OUTPATIENT)
Dept: OBSTETRICS AND GYNECOLOGY | Facility: CLINIC | Age: 25
End: 2025-01-10
Payer: MEDICAID

## 2025-01-10 VITALS
HEIGHT: 64 IN | SYSTOLIC BLOOD PRESSURE: 122 MMHG | DIASTOLIC BLOOD PRESSURE: 78 MMHG | BODY MASS INDEX: 32.63 KG/M2 | WEIGHT: 191.13 LBS

## 2025-01-10 DIAGNOSIS — Z30.430 ENCOUNTER FOR INSERTION OF MIRENA IUD: Primary | ICD-10-CM

## 2025-01-10 LAB
B-HCG UR QL: NEGATIVE
CTP QC/QA: YES
FINAL PATHOLOGIC DIAGNOSIS: NORMAL
Lab: NORMAL

## 2025-01-10 NOTE — PROCEDURES
INSERTION OF INTRAUTERINE DEVICE    Date/Time: 1/10/2025 11:15 AM    Performed by: Mingo Santamaria MD  Authorized by: Mingo Santamaria MD    Consent:     Consent obtained:  Prior to procedure the appropriate consent was completed and verified    Consent given by:  Patient    Procedure risks and benefits discussed: yes      Patient questions answered: yes      Patient agrees, verbalizes understanding, and wants to proceed: yes     Device to be inserted was verified by patient: yes    Educational handouts given: yes      Instructions and paperwork completed: yes    Insertion Procedure:   1 Intra Uterine Device levonorgestreL 52 mg       Pelvic exam performed: yes      Negative GC/chlamydia test: no      Negative urine pregnancy test: yes      Negative serum pregnancy test: no      Cervix cleaned and prepped: yes      Speculum placed in vagina: no      Tenaculum applied to cervix: yes      Uterus sounded: yes      Uterus sound depth (cm):  7.5    IUD inserted with no complications: yes      IUD type:  Mirena    Strings trimmed: yes    Post-procedure:     Patient tolerated procedure well: yes      Patient will follow up after next period: yes      We began our procedure by going over the risks and benefits of the IUD along with alternative methods of contraception.  All questions answered.  Consents signed.  The patient wished to proceed.    A clean speculum was placed into the vaginal vault.  Patient was not allergic to Betadine.  Vagina and cervix were then cleaned with Betadine including the cervical canal.  Single-tooth tenaculum was used to grasp anterior lip of the cervix. The uterus was sounded to 7.5 cm. The IUD, Mirena, previously shown to the patient, was then placed to the depth of the fundus without any difficulty in the standard manner.  Strings were then cut using Longoria scissors to a length of 1.5 inches.  All instruments were then removed.  Silver nitrate was used to obtain hemostasis at the tenaculum  site.    Patient tolerated the procedure well without any complications.  Post-procedure pain was rated at 2/10.  Educational material given.    Patient was told to take over-the-counter Motrin, 2-3 tablets every 6 hours as needed for pain. She will refrain from having intercourse for the next 2 weeks.    Patient will be back for follow-up in 4 weeks.    Lot number: [id number]  Expiration date: 2/28/27

## 2025-01-23 NOTE — ASSESSMENT & PLAN NOTE
In  labor with regular contractions and cervical dilatation  Admit  Magnesium IV  Celestone  Ampicillin  OB ultrasound  Neonatology consult  
In  labor with regular contractions and cervical dilatation  Admit  Magnesium IV  Celestone x 2  Ampicillin  OB ultrasound done  Neonatology consult done    Mag stopped now.  If no cervical change, consider discharge home later today  
In  labor with regular contractions and cervical dilatation  Admit  Magnesium IV  Celestone x 2  Ampicillin  OB ultrasound done  Neonatology consult done    Will stop Mag 24 hours after Celestone #2  If no cervical change, consider discharge home  
Opt out

## 2025-01-27 ENCOUNTER — PATIENT MESSAGE (OUTPATIENT)
Dept: RESEARCH | Facility: OTHER | Age: 25
End: 2025-01-27
Payer: MEDICAID

## 2025-05-08 ENCOUNTER — HOSPITAL ENCOUNTER (EMERGENCY)
Facility: HOSPITAL | Age: 25
Discharge: HOME | End: 2025-05-08
Attending: STUDENT IN AN ORGANIZED HEALTH CARE EDUCATION/TRAINING PROGRAM
Payer: MEDICAID

## 2025-05-08 VITALS
SYSTOLIC BLOOD PRESSURE: 115 MMHG | TEMPERATURE: 99 F | RESPIRATION RATE: 20 BRPM | WEIGHT: 200 LBS | HEART RATE: 87 BPM | HEIGHT: 64 IN | DIASTOLIC BLOOD PRESSURE: 65 MMHG | BODY MASS INDEX: 34.15 KG/M2 | OXYGEN SATURATION: 100 %

## 2025-05-08 DIAGNOSIS — R07.81 RIB PAIN ON LEFT SIDE: ICD-10-CM

## 2025-05-08 DIAGNOSIS — S20.212A CONTUSION OF RIB ON LEFT SIDE, INITIAL ENCOUNTER: Primary | ICD-10-CM

## 2025-05-08 LAB
B-HCG UR QL: NEGATIVE
BILIRUB UR QL STRIP.AUTO: NEGATIVE
CLARITY UR: CLEAR
COLOR UR AUTO: YELLOW
CTP QC/QA: YES
GLUCOSE UR QL STRIP: NEGATIVE
HGB UR QL STRIP: NEGATIVE
HOLD SPECIMEN: NORMAL
KETONES UR QL STRIP: NEGATIVE
LEUKOCYTE ESTERASE UR QL STRIP: NEGATIVE
NITRITE UR QL STRIP: NEGATIVE
PH UR STRIP: 6 [PH]
PROT UR QL STRIP: NEGATIVE
SP GR UR STRIP: 1.01
UROBILINOGEN UR STRIP-ACNC: NEGATIVE EU/DL

## 2025-05-08 PROCEDURE — 81025 URINE PREGNANCY TEST: CPT | Performed by: PHYSICIAN ASSISTANT

## 2025-05-08 PROCEDURE — 25000003 PHARM REV CODE 250: Performed by: PHYSICIAN ASSISTANT

## 2025-05-08 PROCEDURE — 81003 URINALYSIS AUTO W/O SCOPE: CPT | Performed by: PHYSICIAN ASSISTANT

## 2025-05-08 PROCEDURE — 99284 EMERGENCY DEPT VISIT MOD MDM: CPT | Mod: 25

## 2025-05-08 RX ORDER — ACETAMINOPHEN 500 MG
500 TABLET ORAL EVERY 4 HOURS PRN
Qty: 20 TABLET | Refills: 0 | Status: SHIPPED | OUTPATIENT
Start: 2025-05-08 | End: 2025-05-13

## 2025-05-08 RX ORDER — CYCLOBENZAPRINE HCL 10 MG
10 TABLET ORAL 3 TIMES DAILY PRN
Qty: 20 TABLET | Refills: 0 | Status: SHIPPED | OUTPATIENT
Start: 2025-05-08 | End: 2025-05-15

## 2025-05-08 RX ORDER — LIDOCAINE 50 MG/G
1 PATCH TOPICAL
Status: DISCONTINUED | OUTPATIENT
Start: 2025-05-08 | End: 2025-05-08 | Stop reason: HOSPADM

## 2025-05-08 RX ORDER — IBUPROFEN 600 MG/1
600 TABLET, FILM COATED ORAL EVERY 6 HOURS PRN
Qty: 20 TABLET | Refills: 0 | Status: SHIPPED | OUTPATIENT
Start: 2025-05-08 | End: 2025-05-13

## 2025-05-08 RX ORDER — LIDOCAINE 50 MG/G
1 PATCH TOPICAL DAILY
Qty: 15 PATCH | Refills: 0 | Status: SHIPPED | OUTPATIENT
Start: 2025-05-08 | End: 2025-05-23

## 2025-05-08 RX ORDER — IBUPROFEN 600 MG/1
600 TABLET, FILM COATED ORAL
Status: COMPLETED | OUTPATIENT
Start: 2025-05-08 | End: 2025-05-08

## 2025-05-08 RX ADMIN — LIDOCAINE 1 PATCH: 50 PATCH TOPICAL at 09:05

## 2025-05-08 RX ADMIN — IBUPROFEN 600 MG: 600 TABLET, FILM COATED ORAL at 09:05

## 2025-05-09 NOTE — DISCHARGE INSTRUCTIONS
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
## 2025-07-08 ENCOUNTER — HOSPITAL ENCOUNTER (EMERGENCY)
Facility: HOSPITAL | Age: 25
Discharge: HOME | End: 2025-07-08
Attending: EMERGENCY MEDICINE
Payer: MEDICAID

## 2025-07-08 VITALS
HEART RATE: 100 BPM | HEIGHT: 64 IN | OXYGEN SATURATION: 100 % | SYSTOLIC BLOOD PRESSURE: 139 MMHG | DIASTOLIC BLOOD PRESSURE: 78 MMHG | TEMPERATURE: 98 F | RESPIRATION RATE: 14 BRPM | BODY MASS INDEX: 34.13 KG/M2 | WEIGHT: 199.94 LBS

## 2025-07-08 DIAGNOSIS — K02.9 PAIN DUE TO DENTAL CARIES: ICD-10-CM

## 2025-07-08 DIAGNOSIS — K08.89 PAIN, DENTAL: Primary | ICD-10-CM

## 2025-07-08 LAB
B-HCG UR QL: NEGATIVE
CTP QC/QA: YES

## 2025-07-08 PROCEDURE — 25000003 PHARM REV CODE 250

## 2025-07-08 PROCEDURE — 99284 EMERGENCY DEPT VISIT MOD MDM: CPT

## 2025-07-08 PROCEDURE — 81025 URINE PREGNANCY TEST: CPT

## 2025-07-08 RX ORDER — CHLORHEXIDINE GLUCONATE ORAL RINSE 1.2 MG/ML
15 SOLUTION DENTAL 2 TIMES DAILY
Qty: 420 ML | Refills: 0 | Status: SHIPPED | OUTPATIENT
Start: 2025-07-08 | End: 2025-07-22

## 2025-07-08 RX ORDER — OXYCODONE AND ACETAMINOPHEN 5; 325 MG/1; MG/1
1 TABLET ORAL
Refills: 0 | Status: COMPLETED | OUTPATIENT
Start: 2025-07-08 | End: 2025-07-08

## 2025-07-08 RX ORDER — OXYCODONE AND ACETAMINOPHEN 5; 325 MG/1; MG/1
1 TABLET ORAL EVERY 6 HOURS PRN
Qty: 12 TABLET | Refills: 0 | Status: SHIPPED | OUTPATIENT
Start: 2025-07-08

## 2025-07-08 RX ADMIN — OXYCODONE HYDROCHLORIDE AND ACETAMINOPHEN 1 TABLET: 5; 325 TABLET ORAL at 03:07

## 2025-07-08 NOTE — ED NOTES
26 y/o female report to the ED due to to tooth pain X 2 weeks. Pt has been taking Ibuprofen and Tylenol for pain.

## 2025-07-08 NOTE — DISCHARGE INSTRUCTIONS
Thank you for coming to our Emergency Department today. It is important to remember that some problems or medical conditions are difficult to diagnose and may not be found or addressed during your Emergency Department visit.  These conditions often start with non-specific symptoms and can only be diagnosed on follow up visits with your primary care physician or specialist when the symptoms continue or change. Please remember that all medical conditions can change, and we cannot predict how you will be feeling tomorrow or the next day. Return to the ER with any questions/concerns, new/concerning symptoms including fever, chest pain, shortness of breath, loss of consciousness, dizziness, weakness, worsening symptoms, failure to improve, or any other concerns. Also, please follow up with your Primary Care Physician and/or Pediatrician in the next 1-2 days to review your ED visit in entirety and for re-evaluation.   Be sure to follow up with your primary care doctor and review all labs/imaging/tests that were performed during your ER visit with them. It is very common for us to identify non-emergent incidental findings which must be followed up with your primary care physician.  Some labs/imaging/tests may be outside of the normal range, and require non-emergent follow-up and/or further investigation/treatment/procedures/testing to help diagnose/exclude/prevent complications or other potentially serious medical conditions. Some abnormalities may not have been discussed or addressed during your ER visit. Some lab results may not return during your ER visit but can be accessible by downloading the free [facility] darrin or by visiting [email or web address] . It is important for you to review all labs/imaging/tests which are outside of the normal range with your physician.  An ER visit does not replace a primary care visit, and many screening tests or follow-up tests cannot be ordered by an ER doctor or performed by  the ER. Some tests may even require pre-approval.  If you do not have a primary care doctor, you may contact the one listed on your discharge paperwork or you may also call the [facility] Appointment Desk at [phone] , or [address] [place] [address] at  [phone] to schedule an appointment, or establish care with a primary care doctor or even a specialist and to obtain information about local resources. It is important to your health that you have a primary care doctor.  Please take all medications as directed. We have done our best to select a medication for you that will treat your condition however, all medications may potentially have side-effects and it is impossible to predict which medications may give you side-effects or what those side-effects (if any) those medications may give you.  If you feel that you are having a negative effect or side-effect of any medication you should stop taking those medications immediately and seek medical attention. If you feel that you are having a life-threatening reaction call 911.  Do not drive, swim, climb to height, take a bath, operate heavy machinery, drink alcohol or take potentially sedating medications, sign any legal documents or make any important decisions for 24 hours if you have received any pain medications, sedatives or mood altering drugs during your ER visit or within 24 hours of taking them if they have been prescribed to you.   You can find additional resources for Dentists, hearing aids, durable medical equipment, low cost pharmacies and other resources at [email or web address]  Patient agrees with this discharge plan. Discussed strict return precautions, they verbalized understanding.   § Please take all medication as prescribed.

## 2025-07-08 NOTE — ED PROVIDER NOTES
Encounter Date: 7/8/2025       History     Chief Complaint   Patient presents with    Dental Pain     Dental appt is in august     The history is provided by the patient and medical records.   25-year-old female past medical history of asthma presenting to the emergency department today for evaluation of dental pain for several weeks.  States she was evaluated by the dentist and told she needed to have a root canal and her wisdom teeth removed with the appointment scheduled on August 3rd.  Reports that she has been taking ibuprofen with no resolution of symptoms.  No other complaints at this time denies any facial swelling, difficulty swallowing, sore throat, neck, headache, fever, other associated symptoms.  Review of patient's allergies indicates:   Allergen Reactions    Fish containing products Rash     Past Medical History:   Diagnosis Date    Asthma     Hypertension affecting pregnancy in third trimester 6/21/2021     No past surgical history on file.  Family History   Problem Relation Name Age of Onset    Miscarriages / Stillbirths Mother      Hypertension Mother      Hypertension Maternal Grandmother      Diabetes Maternal Grandfather      Stroke Maternal Grandfather      Seizures Brother       Social History[1]  Review of Systems   Constitutional:  Negative for chills, diaphoresis, fatigue and fever.   HENT:  Positive for dental problem (Dental pain). Negative for congestion, drooling, facial swelling, mouth sores, rhinorrhea, sore throat, trouble swallowing and voice change.    Eyes:  Negative for redness and visual disturbance.   Respiratory:  Negative for cough and shortness of breath.    Cardiovascular:  Negative for chest pain, palpitations and leg swelling.   Gastrointestinal:  Negative for abdominal pain, diarrhea, nausea and vomiting.   Genitourinary:  Negative for difficulty urinating, dysuria, flank pain and frequency.   Musculoskeletal:  Negative for arthralgias, back pain, myalgias, neck pain and  neck stiffness.   Skin:  Negative for rash.   Neurological:  Negative for dizziness, weakness, light-headedness and headaches.   Hematological:  Does not bruise/bleed easily.       Physical Exam     Initial Vitals [07/08/25 1515]   BP Pulse Resp Temp SpO2   139/78 100 20 98 °F (36.7 °C) 100 %      MAP       --         Physical Exam    Nursing note and vitals reviewed.  Constitutional: She appears well-developed and well-nourished. She is not diaphoretic.  Non-toxic appearance. No distress.   HENT:   Head: Normocephalic and atraumatic.   Right Ear: External ear normal.   Left Ear: External ear normal.   Nose: Nose normal. Mouth/Throat: Uvula is midline, oropharynx is clear and moist and mucous membranes are normal. No oral lesions. No trismus in the jaw. Dental caries present. No dental abscesses, uvula swelling or lacerations.   No trismus.  Uvula midline.  Tongue midline.  No oropharyngeal erythema, edema, swelling.  No tenderness to the floor of the mouth.  No submandibular sublingual erythema or swelling.  No evidence of facial swelling.  No dental abscess.  Patient is tolerating oral secretions and speaking in complete sentences without muffled hot potato voice.  That has evidence of lower wisdom teeth protrude it is.  That has a right upper molar dental caries.  No gingival swelling or friability.   Eyes: Conjunctivae, EOM and lids are normal. Pupils are equal, round, and reactive to light. Right eye exhibits no discharge. Left eye exhibits no discharge.   Neck:   Normal range of motion.   Full passive range of motion without pain.     Cardiovascular:  Normal rate, regular rhythm, normal heart sounds and normal pulses.     Exam reveals no distant heart sounds and no friction rub.       Pulmonary/Chest: Effort normal and breath sounds normal. No respiratory distress.   Abdominal: Abdomen is soft. Bowel sounds are normal. She exhibits no distension, no pulsatile midline mass and no mass. There is no abdominal  tenderness.   No right CVA tenderness.  No left CVA tenderness. There is no rebound and no guarding.   Musculoskeletal:         General: Normal range of motion.      Right wrist: Normal.      Left wrist: Normal.      Right hand: Normal.      Left hand: Normal.      Cervical back: Normal, full passive range of motion without pain and normal range of motion.      Thoracic back: Normal.      Lumbar back: Normal.      Right lower leg: Normal.      Left lower leg: Normal.     Neurological: She is alert and oriented to person, place, and time.   Skin: Skin is warm and dry. Capillary refill takes less than 2 seconds. No pallor.   Psychiatric: She has a normal mood and affect. Her speech is normal and behavior is normal. Thought content normal.         ED Course   Procedures  Labs Reviewed   POCT URINE PREGNANCY          Imaging Results    None          Medications   oxyCODONE-acetaminophen 5-325 mg per tablet 1 tablet (1 tablet Oral Given 7/8/25 1529)     Medical Decision Making  25-year-old female past medical history of asthma presenting to the emergency department today for evaluation of dental pain for several weeks.  States she was evaluated by the dentist and told she needed to have a root canal and her wisdom teeth removed with the appointment scheduled on August 3rd.  Reports that she has been taking ibuprofen with no resolution of symptoms.  No other complaints at this time denies any facial swelling, difficulty swallowing, sore throat, neck, headache, fever, other associated symptoms.  Patient's chart and medical history reviewed.  Patient's vitals reviewed.  They are afebrile, no respiratory distress, nontoxic-appearing in the ED.  Differential diagnosis considered Gingivitis, Dental abscess, Fractured tooth, Facial cellulitis, Pulpitis, Peritonsillar abscess, Retropharyngeal abscess, Zeyad's angina , Dry Socket, Trench mouth.   Physical exam as above.   do not suspect sepsis., do not suspect deep space  infection with FROM of neck without pain or limitation, no oropharyngeal/submandibular/sublingual erythema/swelling, patient speaking in complete sentences without muffled/hot potato voice, tolerating oral secretions. , and findings consistent with dental caries without evidence of dental abscess. No emergent findings on todays evaluation. Will dc home with medications for pain with follow up at dentist at scheduled appointment, encouraged to attempt sooner appointment if possible with her dentist.     At this time I'll discharge home to follow up with a primary care physician in the next 1-2 days for further evaluation. I have also discussed with the patient and provided resources for finding a primary care provider in their discharge paperwork. After taking into careful account the historical factors and physical exam findings of the patient's presentation today, in conjunction with the empirical and objective data obtained on ED workup, no acute emergent medical condition has been identified. I feel it is safe and appropriate at this time for the patient to be discharged to follow-up as detailed in their discharge instructions for reevaluation and possible continued outpatient workup and management. I have discussed the specifics of the workup with the patient/family and they have verbalized understanding of the details of the workup, the diagnosis, the treatment plan, and the need for outpatient follow-up.  Although the patient has no emergent etiology today this does not preclude the development of an emergent condition so in addition, I have advised the patient/family that they need to return to the ED and/or activate EMS at any time with worsening of their symptoms, change of their symptoms, or with any other medical complaint, concern, or question.  The patient remained comfortable and stable during their visit in the ED.  Discharge and follow-up instructions discussed with the patient/family who expressed  understanding and willingness to comply with my recommendations.I discussed with the patient/family the diagnosis, treatment plan, indications for return to the emergency department, and for expected follow-up. The patient/family verbalized an understanding. The patient/family is asked if there are any questions or concerns. We discuss the case, until all issues are addressed to the patient/family's satisfaction. Patient/family informed that incidental findings and radiology reports should be reviewed with a primary care provider and information is provided for obtaining a primary care provider if one has not been established. Patient informed to return to the ED if any further questions/concerns. Patient/family understands and is agreeable to the plan.      JD FOX PA-C.    DISCLAIMER: This note was prepared with Numote voice recognition transcription software. Garbled syntax, mangled pronouns, and other bizarre constructions may be attributed to that software system.        Amount and/or Complexity of Data Reviewed  Labs: ordered.    Risk  Prescription drug management.                                      Clinical Impression:  Final diagnoses:  [K08.89] Pain, dental (Primary)  [K02.9] Pain due to dental caries          ED Disposition Condition    Discharge Stable          ED Prescriptions       Medication Sig Dispense Start Date End Date Auth. Provider    oxyCODONE-acetaminophen (PERCOCET) 5-325 mg per tablet Take 1 tablet by mouth every 6 (six) hours as needed. 12 tablet 7/8/2025 -- Jd Fox PA-C    chlorhexidine (PERIDEX) 0.12 % solution Use as directed 15 mLs in the mouth or throat 2 (two) times daily. for 14 days 420 mL 7/8/2025 7/22/2025 Jd Fox PA-C          Follow-up Information       Follow up With Specialties Details Why Contact Info    Your dentist appointment on Aug. 3rd.  Go to  for follow up     Charles Hein MD Pediatrics Schedule an appointment as soon as possible for  a visit in 2 days for follow up [address] [place] [address] [address]  [address]  [place] [address]  [phone]      [facility] - Emergency Dept Emergency Medicine Go to  If you have new or worsening symptoms, or if you have any concerns at all. [address] [facility] [place] [id number] [phone]                   [1]   Social History  Tobacco Use    Smoking status: Never    Smokeless tobacco: Never   Substance Use Topics    Alcohol use: Not Currently    Drug use: Yes     Types: Marijuana        Jd Sheridan PA-C  07/08/25 1530

## 2025-08-21 ENCOUNTER — HOSPITAL ENCOUNTER (EMERGENCY)
Facility: HOSPITAL | Age: 25
Discharge: HOME | End: 2025-08-21
Attending: STUDENT IN AN ORGANIZED HEALTH CARE EDUCATION/TRAINING PROGRAM
Payer: MEDICAID

## 2025-08-21 VITALS
HEIGHT: 64 IN | TEMPERATURE: 98 F | OXYGEN SATURATION: 98 % | BODY MASS INDEX: 34.15 KG/M2 | WEIGHT: 200 LBS | DIASTOLIC BLOOD PRESSURE: 68 MMHG | SYSTOLIC BLOOD PRESSURE: 134 MMHG | HEART RATE: 70 BPM | RESPIRATION RATE: 20 BRPM

## 2025-08-21 DIAGNOSIS — K04.7 DENTAL INFECTION: ICD-10-CM

## 2025-08-21 DIAGNOSIS — K08.89 TOOTHACHE: Primary | ICD-10-CM

## 2025-08-21 DIAGNOSIS — K02.9 DENTAL DECAY: ICD-10-CM

## 2025-08-21 LAB
B-HCG UR QL: NEGATIVE
CTP QC/QA: YES

## 2025-08-21 PROCEDURE — 81025 URINE PREGNANCY TEST: CPT | Performed by: EMERGENCY MEDICINE

## 2025-08-21 PROCEDURE — 99284 EMERGENCY DEPT VISIT MOD MDM: CPT

## 2025-08-21 RX ORDER — AMOXICILLIN AND CLAVULANATE POTASSIUM 875; 125 MG/1; MG/1
1 TABLET, FILM COATED ORAL 2 TIMES DAILY
Qty: 20 TABLET | Refills: 0 | Status: SHIPPED | OUTPATIENT
Start: 2025-08-21 | End: 2025-08-31

## 2025-08-21 RX ORDER — TRAMADOL HYDROCHLORIDE 50 MG/1
50 TABLET, FILM COATED ORAL EVERY 6 HOURS PRN
Qty: 12 TABLET | Refills: 0 | Status: SHIPPED | OUTPATIENT
Start: 2025-08-21